# Patient Record
Sex: FEMALE | Race: WHITE | Employment: OTHER | URBAN - METROPOLITAN AREA
[De-identification: names, ages, dates, MRNs, and addresses within clinical notes are randomized per-mention and may not be internally consistent; named-entity substitution may affect disease eponyms.]

---

## 2018-05-25 ENCOUNTER — HOSPITAL ENCOUNTER (OUTPATIENT)
Age: 53
Discharge: HOME | End: 2018-05-27
Payer: COMMERCIAL

## 2018-05-25 ENCOUNTER — OFFICE VISIT (OUTPATIENT)
Dept: CARDIOLOGY CLINIC | Age: 53
End: 2018-05-25
Payer: COMMERCIAL

## 2018-05-25 VITALS
DIASTOLIC BLOOD PRESSURE: 86 MMHG | RESPIRATION RATE: 18 BRPM | BODY MASS INDEX: 44.39 KG/M2 | HEART RATE: 84 BPM | HEIGHT: 64 IN | SYSTOLIC BLOOD PRESSURE: 110 MMHG | WEIGHT: 260 LBS

## 2018-05-25 DIAGNOSIS — I51.9 HEART PROBLEM: Primary | ICD-10-CM

## 2018-05-25 DIAGNOSIS — R06.02 SHORTNESS OF BREATH: ICD-10-CM

## 2018-05-25 LAB
ANION GAP SERPL CALCULATED.3IONS-SCNC: 13 MMOL/L (ref 7–16)
BUN BLDV-MCNC: 15 MG/DL (ref 6–20)
CALCIUM SERPL-MCNC: 9.3 MG/DL (ref 8.6–10.2)
CHLORIDE BLD-SCNC: 104 MMOL/L (ref 98–107)
CO2: 25 MMOL/L (ref 22–29)
CREAT SERPL-MCNC: 0.7 MG/DL (ref 0.5–1)
GFR AFRICAN AMERICAN: >60
GFR NON-AFRICAN AMERICAN: >60 ML/MIN/1.73
GLUCOSE BLD-MCNC: 98 MG/DL (ref 74–109)
POTASSIUM SERPL-SCNC: 4.4 MMOL/L (ref 3.5–5)
PRO-BNP: 17 PG/ML (ref 0–125)
SODIUM BLD-SCNC: 142 MMOL/L (ref 132–146)

## 2018-05-25 PROCEDURE — G8427 DOCREV CUR MEDS BY ELIG CLIN: HCPCS | Performed by: INTERNAL MEDICINE

## 2018-05-25 PROCEDURE — 99213 OFFICE O/P EST LOW 20 MIN: CPT | Performed by: INTERNAL MEDICINE

## 2018-05-25 PROCEDURE — 93000 ELECTROCARDIOGRAM COMPLETE: CPT | Performed by: INTERNAL MEDICINE

## 2018-05-25 PROCEDURE — G8417 CALC BMI ABV UP PARAM F/U: HCPCS | Performed by: INTERNAL MEDICINE

## 2018-05-25 PROCEDURE — 1036F TOBACCO NON-USER: CPT | Performed by: INTERNAL MEDICINE

## 2018-05-25 PROCEDURE — 80048 BASIC METABOLIC PNL TOTAL CA: CPT

## 2018-05-25 PROCEDURE — 3017F COLORECTAL CA SCREEN DOC REV: CPT | Performed by: INTERNAL MEDICINE

## 2018-05-25 PROCEDURE — 83880 ASSAY OF NATRIURETIC PEPTIDE: CPT

## 2018-06-01 ENCOUNTER — OFFICE VISIT (OUTPATIENT)
Dept: CARDIOLOGY CLINIC | Age: 53
End: 2018-06-01
Payer: COMMERCIAL

## 2018-06-01 ENCOUNTER — HOSPITAL ENCOUNTER (OUTPATIENT)
Dept: NON INVASIVE DIAGNOSTICS | Age: 53
Discharge: HOME | End: 2018-06-01
Payer: COMMERCIAL

## 2018-06-01 VITALS
HEART RATE: 75 BPM | BODY MASS INDEX: 44.49 KG/M2 | HEIGHT: 64 IN | DIASTOLIC BLOOD PRESSURE: 80 MMHG | RESPIRATION RATE: 12 BRPM | SYSTOLIC BLOOD PRESSURE: 136 MMHG | WEIGHT: 260.6 LBS

## 2018-06-01 DIAGNOSIS — R07.9 CHEST PAIN, UNSPECIFIED TYPE: ICD-10-CM

## 2018-06-01 DIAGNOSIS — R06.02 SHORTNESS OF BREATH: ICD-10-CM

## 2018-06-01 DIAGNOSIS — I51.9 HEART PROBLEM: Primary | ICD-10-CM

## 2018-06-01 LAB
LV EF: 60 %
LVEF MODALITY: NORMAL

## 2018-06-01 PROCEDURE — G8417 CALC BMI ABV UP PARAM F/U: HCPCS | Performed by: INTERNAL MEDICINE

## 2018-06-01 PROCEDURE — 3017F COLORECTAL CA SCREEN DOC REV: CPT | Performed by: INTERNAL MEDICINE

## 2018-06-01 PROCEDURE — 93000 ELECTROCARDIOGRAM COMPLETE: CPT | Performed by: INTERNAL MEDICINE

## 2018-06-01 PROCEDURE — G8427 DOCREV CUR MEDS BY ELIG CLIN: HCPCS | Performed by: INTERNAL MEDICINE

## 2018-06-01 PROCEDURE — 99213 OFFICE O/P EST LOW 20 MIN: CPT | Performed by: INTERNAL MEDICINE

## 2018-06-01 PROCEDURE — 1036F TOBACCO NON-USER: CPT | Performed by: INTERNAL MEDICINE

## 2018-06-01 PROCEDURE — 93306 TTE W/DOPPLER COMPLETE: CPT

## 2018-06-07 ENCOUNTER — TELEPHONE (OUTPATIENT)
Dept: CARDIOLOGY CLINIC | Age: 53
End: 2018-06-07

## 2018-06-07 DIAGNOSIS — R06.02 SHORTNESS OF BREATH: ICD-10-CM

## 2018-06-07 DIAGNOSIS — R07.9 CHEST PAIN, UNSPECIFIED TYPE: ICD-10-CM

## 2019-02-24 ENCOUNTER — HOSPITAL ENCOUNTER (EMERGENCY)
Age: 54
Discharge: HOME | End: 2019-02-24
Payer: COMMERCIAL

## 2019-02-24 ENCOUNTER — APPOINTMENT (OUTPATIENT)
Dept: GENERAL RADIOLOGY | Age: 54
End: 2019-02-24
Payer: COMMERCIAL

## 2019-02-24 VITALS
BODY MASS INDEX: 44.39 KG/M2 | OXYGEN SATURATION: 97 % | RESPIRATION RATE: 18 BRPM | DIASTOLIC BLOOD PRESSURE: 95 MMHG | WEIGHT: 260 LBS | SYSTOLIC BLOOD PRESSURE: 141 MMHG | TEMPERATURE: 97.7 F | HEART RATE: 82 BPM | HEIGHT: 64 IN

## 2019-02-24 DIAGNOSIS — S46.911A MUSCLE STRAIN OF RIGHT UPPER ARM, INITIAL ENCOUNTER: Primary | ICD-10-CM
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
## 2019-02-24 PROCEDURE — 99283 EMERGENCY DEPT VISIT LOW MDM: CPT

## 2019-02-24 PROCEDURE — 73080 X-RAY EXAM OF ELBOW: CPT

## 2019-02-24 PROCEDURE — 73030 X-RAY EXAM OF SHOULDER: CPT

## 2019-02-24 RX ORDER — CYCLOBENZAPRINE HCL 5 MG
5 TABLET ORAL 3 TIMES DAILY PRN
Qty: 12 TABLET | Refills: 0 | Status: SHIPPED | OUTPATIENT
Start: 2019-02-24 | End: 2019-10-10 | Stop reason: ALTCHOICE

## 2019-02-24 RX ORDER — IBUPROFEN 800 MG/1
800 TABLET ORAL EVERY 6 HOURS PRN
Qty: 16 TABLET | Refills: 0 | Status: ON HOLD | OUTPATIENT
Start: 2019-02-24 | End: 2019-11-19

## 2019-02-24 ASSESSMENT — PAIN DESCRIPTION - LOCATION: LOCATION: ARM

## 2019-02-24 ASSESSMENT — PAIN - FUNCTIONAL ASSESSMENT: PAIN_FUNCTIONAL_ASSESSMENT: ACTIVITIES ARE NOT PREVENTED

## 2019-02-24 ASSESSMENT — PAIN DESCRIPTION - PAIN TYPE: TYPE: ACUTE PAIN

## 2019-02-24 ASSESSMENT — PAIN DESCRIPTION - DESCRIPTORS: DESCRIPTORS: ACHING

## 2019-02-24 ASSESSMENT — PAIN SCALES - GENERAL: PAINLEVEL_OUTOF10: 10

## 2019-02-24 ASSESSMENT — PAIN DESCRIPTION - ORIENTATION: ORIENTATION: RIGHT

## 2019-04-09 ENCOUNTER — HOSPITAL ENCOUNTER (EMERGENCY)
Age: 54
Discharge: HOME | End: 2019-04-09
Attending: EMERGENCY MEDICINE
Payer: COMMERCIAL

## 2019-04-09 ENCOUNTER — APPOINTMENT (OUTPATIENT)
Dept: CT IMAGING | Age: 54
End: 2019-04-09
Payer: COMMERCIAL

## 2019-04-09 ENCOUNTER — APPOINTMENT (OUTPATIENT)
Dept: GENERAL RADIOLOGY | Age: 54
End: 2019-04-09
Payer: COMMERCIAL

## 2019-04-09 VITALS
OXYGEN SATURATION: 97 % | DIASTOLIC BLOOD PRESSURE: 69 MMHG | HEART RATE: 61 BPM | TEMPERATURE: 97.4 F | SYSTOLIC BLOOD PRESSURE: 113 MMHG | RESPIRATION RATE: 18 BRPM

## 2019-04-09 DIAGNOSIS — N39.0 URINARY TRACT INFECTION WITHOUT HEMATURIA, SITE UNSPECIFIED: ICD-10-CM

## 2019-04-09 DIAGNOSIS — R55 SYNCOPE AND COLLAPSE: ICD-10-CM

## 2019-04-09 DIAGNOSIS — R51.9 NONINTRACTABLE HEADACHE, UNSPECIFIED CHRONICITY PATTERN, UNSPECIFIED HEADACHE TYPE: Primary | ICD-10-CM

## 2019-04-09 LAB
ALBUMIN SERPL-MCNC: 4.4 G/DL (ref 3.5–5.2)
ALP BLD-CCNC: 90 U/L (ref 35–104)
ALT SERPL-CCNC: 17 U/L (ref 0–32)
ANION GAP SERPL CALCULATED.3IONS-SCNC: 14 MMOL/L (ref 7–16)
AST SERPL-CCNC: 17 U/L (ref 0–31)
BACTERIA: ABNORMAL /HPF
BASOPHILS ABSOLUTE: 0.03 E9/L (ref 0–0.2)
BASOPHILS RELATIVE PERCENT: 0.5 % (ref 0–2)
BILIRUB SERPL-MCNC: 0.3 MG/DL (ref 0–1.2)
BILIRUBIN URINE: ABNORMAL
BLOOD, URINE: ABNORMAL
BUN BLDV-MCNC: 13 MG/DL (ref 6–20)
CALCIUM SERPL-MCNC: 9.5 MG/DL (ref 8.6–10.2)
CHLORIDE BLD-SCNC: 104 MMOL/L (ref 98–107)
CHP ED QC CHECK: NORMAL
CLARITY: CLEAR
CO2: 24 MMOL/L (ref 22–29)
COLOR: ABNORMAL
CREAT SERPL-MCNC: 0.6 MG/DL (ref 0.5–1)
CRYSTALS, UA: ABNORMAL
EOSINOPHILS ABSOLUTE: 0.1 E9/L (ref 0.05–0.5)
EOSINOPHILS RELATIVE PERCENT: 1.5 % (ref 0–6)
EPITHELIAL CELLS, UA: ABNORMAL /HPF
GFR AFRICAN AMERICAN: >60
GFR NON-AFRICAN AMERICAN: >60 ML/MIN/1.73
GLUCOSE BLD-MCNC: 114 MG/DL
GLUCOSE BLD-MCNC: 119 MG/DL (ref 74–99)
GLUCOSE URINE: NEGATIVE MG/DL
HCG, URINE, POC: NEGATIVE
HCT VFR BLD CALC: 40.6 % (ref 34–48)
HEMOGLOBIN: 12.9 G/DL (ref 11.5–15.5)
IMMATURE GRANULOCYTES #: 0.02 E9/L
IMMATURE GRANULOCYTES %: 0.3 % (ref 0–5)
KETONES, URINE: NEGATIVE MG/DL
LACTIC ACID: 2.1 MMOL/L (ref 0.5–2.2)
LEUKOCYTE ESTERASE, URINE: ABNORMAL
LIPASE: 38 U/L (ref 13–60)
LYMPHOCYTES ABSOLUTE: 1.35 E9/L (ref 1.5–4)
LYMPHOCYTES RELATIVE PERCENT: 20.7 % (ref 20–42)
Lab: NORMAL
MCH RBC QN AUTO: 27.7 PG (ref 26–35)
MCHC RBC AUTO-ENTMCNC: 31.8 % (ref 32–34.5)
MCV RBC AUTO: 87.3 FL (ref 80–99.9)
METER GLUCOSE: 114 MG/DL (ref 74–99)
MONOCYTES ABSOLUTE: 0.35 E9/L (ref 0.1–0.95)
MONOCYTES RELATIVE PERCENT: 5.4 % (ref 2–12)
NEGATIVE QC PASS/FAIL: NORMAL
NEUTROPHILS ABSOLUTE: 4.67 E9/L (ref 1.8–7.3)
NEUTROPHILS RELATIVE PERCENT: 71.6 % (ref 43–80)
NITRITE, URINE: NEGATIVE
PDW BLD-RTO: 14.6 FL (ref 11.5–15)
PH UA: 6 (ref 5–9)
PLATELET # BLD: 300 E9/L (ref 130–450)
PMV BLD AUTO: 10.4 FL (ref 7–12)
POSITIVE QC PASS/FAIL: NORMAL
POTASSIUM REFLEX MAGNESIUM: 3.6 MMOL/L (ref 3.5–5)
PROTEIN UA: NEGATIVE MG/DL
RBC # BLD: 4.65 E12/L (ref 3.5–5.5)
RBC UA: ABNORMAL /HPF (ref 0–2)
SODIUM BLD-SCNC: 142 MMOL/L (ref 132–146)
SPECIFIC GRAVITY UA: >=1.03 (ref 1–1.03)
TOTAL PROTEIN: 7.3 G/DL (ref 6.4–8.3)
TROPONIN: <0.01 NG/ML (ref 0–0.03)
TSH SERPL DL<=0.05 MIU/L-ACNC: 2.93 UIU/ML (ref 0.27–4.2)
UROBILINOGEN, URINE: 0.2 E.U./DL
WBC # BLD: 6.5 E9/L (ref 4.5–11.5)
WBC UA: ABNORMAL /HPF (ref 0–5)

## 2019-04-09 PROCEDURE — 83605 ASSAY OF LACTIC ACID: CPT

## 2019-04-09 PROCEDURE — 99284 EMERGENCY DEPT VISIT MOD MDM: CPT

## 2019-04-09 PROCEDURE — 96375 TX/PRO/DX INJ NEW DRUG ADDON: CPT

## 2019-04-09 PROCEDURE — 81001 URINALYSIS AUTO W/SCOPE: CPT

## 2019-04-09 PROCEDURE — 84443 ASSAY THYROID STIM HORMONE: CPT

## 2019-04-09 PROCEDURE — 83690 ASSAY OF LIPASE: CPT

## 2019-04-09 PROCEDURE — 72125 CT NECK SPINE W/O DYE: CPT

## 2019-04-09 PROCEDURE — 82962 GLUCOSE BLOOD TEST: CPT

## 2019-04-09 PROCEDURE — 36415 COLL VENOUS BLD VENIPUNCTURE: CPT

## 2019-04-09 PROCEDURE — 85025 COMPLETE CBC W/AUTO DIFF WBC: CPT

## 2019-04-09 PROCEDURE — 96374 THER/PROPH/DIAG INJ IV PUSH: CPT

## 2019-04-09 PROCEDURE — 71046 X-RAY EXAM CHEST 2 VIEWS: CPT

## 2019-04-09 PROCEDURE — 2580000003 HC RX 258: Performed by: EMERGENCY MEDICINE

## 2019-04-09 PROCEDURE — 6360000002 HC RX W HCPCS: Performed by: EMERGENCY MEDICINE

## 2019-04-09 PROCEDURE — 84484 ASSAY OF TROPONIN QUANT: CPT

## 2019-04-09 PROCEDURE — 70450 CT HEAD/BRAIN W/O DYE: CPT

## 2019-04-09 PROCEDURE — 80053 COMPREHEN METABOLIC PANEL: CPT

## 2019-04-09 RX ORDER — METOCLOPRAMIDE HYDROCHLORIDE 5 MG/ML
10 INJECTION INTRAMUSCULAR; INTRAVENOUS ONCE
Status: COMPLETED | OUTPATIENT
Start: 2019-04-09 | End: 2019-04-09

## 2019-04-09 RX ORDER — DIPHENHYDRAMINE HYDROCHLORIDE 50 MG/ML
25 INJECTION INTRAMUSCULAR; INTRAVENOUS ONCE
Status: COMPLETED | OUTPATIENT
Start: 2019-04-09 | End: 2019-04-09

## 2019-04-09 RX ORDER — CEFDINIR 300 MG/1
300 CAPSULE ORAL 2 TIMES DAILY
Qty: 14 CAPSULE | Refills: 0 | Status: SHIPPED | OUTPATIENT
Start: 2019-04-09 | End: 2019-04-16

## 2019-04-09 RX ORDER — 0.9 % SODIUM CHLORIDE 0.9 %
1000 INTRAVENOUS SOLUTION INTRAVENOUS ONCE
Status: COMPLETED | OUTPATIENT
Start: 2019-04-09 | End: 2019-04-09

## 2019-04-09 RX ADMIN — METOCLOPRAMIDE 10 MG: 5 INJECTION, SOLUTION INTRAMUSCULAR; INTRAVENOUS at 11:52

## 2019-04-09 RX ADMIN — SODIUM CHLORIDE 1000 ML: 9 INJECTION, SOLUTION INTRAVENOUS at 10:45

## 2019-04-09 RX ADMIN — DIPHENHYDRAMINE HYDROCHLORIDE 25 MG: 50 INJECTION INTRAMUSCULAR; INTRAVENOUS at 11:52

## 2019-04-09 ASSESSMENT — ENCOUNTER SYMPTOMS
DIARRHEA: 0
SHORTNESS OF BREATH: 0
BACK PAIN: 0
COUGH: 0
SINUS PAIN: 0
SORE THROAT: 0
PHOTOPHOBIA: 0
VOMITING: 0
NAUSEA: 1
ABDOMINAL PAIN: 1
CHEST TIGHTNESS: 0
EYE REDNESS: 0
EYE PAIN: 0

## 2019-04-09 NOTE — ED PROVIDER NOTES
Face to face end of shift report received from [facility]. Rounding completed. Patient observed in bed.     Beth Rand  9/12/2017  11:45 PM         This is a 49 yo F with a PMH of HLD and 'atrial septal aneurysm' presents with various complaints including syncope, HA and lightheadedness that have been going on for several days. Patient complains of HA for the past few days. She states she attributes it to stress but has had multiple falls and dizziness and wanted to be evaluated. She has a  with colon CA she has been helping to take care of. She denies any SI/HI/hallucinations. Headaches are located on the top of her head, feels like a dull pain that is pretty constant. Pain rated as 8/10, says it waxes and wanes. Sleeping helps the pain otherwise she hasn't tried anything for pain. She wears contacts rarely and glasses, states that over time she has had worsening vision. Denies blurry/double vision/photophobia. Last week she was ambulating, became dizzy/lightheaded and 'went down'. Unsure how long she passed out for but her friend was upstairs when this happened, came down and found patient down in her living room. She had fallen onto her back and 'came to' within a few minutes. He wanted her to come to ED, she refused at that time. States she has had some pain in the back of her head and neck from the fall. She also complains of abdominal pain she has had for months but has not recently been evaluated for. She has a history of hematemesis and hematochezia, has previously had endoscopy and colonoscopy and is supposed to follow up for repeat exams because polyps were previously found. Review of Systems   Constitutional: Positive for fatigue. Negative for activity change, chills and fever. HENT: Negative for congestion, sinus pain and sore throat. Eyes: Negative for photophobia, pain, redness and visual disturbance. Respiratory: Negative for cough, chest tightness and shortness of breath. Cardiovascular: Negative for chest pain, palpitations and leg swelling. Gastrointestinal: Positive for abdominal pain and nausea. Negative for diarrhea and vomiting. Genitourinary: Negative for dysuria and urgency. Musculoskeletal: Positive for neck pain. Negative for back pain and neck stiffness. Skin: Negative for rash. Neurological: Positive for syncope, light-headedness and headaches. Negative for dizziness and seizures. Psychiatric/Behavioral: Negative for confusion and suicidal ideas. The patient is not nervous/anxious. Physical Exam   Constitutional: She is oriented to person, place, and time. She appears well-developed and well-nourished. No distress. HENT:   Head: Normocephalic and atraumatic. Nose: Nose normal.   Mouth/Throat: Oropharynx is clear and moist. No oropharyngeal exudate. Eyes: Pupils are equal, round, and reactive to light. Conjunctivae and EOM are normal.   Neck: Normal range of motion. Neck supple. There is some paraspinal tenderness with palpation, normal ROM, no crepitance   Cardiovascular: Normal rate, regular rhythm, normal heart sounds and intact distal pulses. Pulmonary/Chest: Effort normal and breath sounds normal. No respiratory distress. She exhibits no tenderness. Abdominal: Soft. Bowel sounds are normal. She exhibits no distension. There is tenderness. Minimal periumbilical pain with palpation   Musculoskeletal: Normal range of motion. She exhibits no edema. Neurological: She is alert and oriented to person, place, and time. No cranial nerve deficit or sensory deficit. She exhibits normal muscle tone. Skin: Skin is warm and dry. Capillary refill takes less than 2 seconds. She is not diaphoretic. Psychiatric: She has a normal mood and affect. Her behavior is normal. Judgment and thought content normal.   Nursing note and vitals reviewed.       Procedures    MDM  Number of Diagnoses or Management Options  Nonintractable headache, unspecified chronicity pattern, unspecified headache type:   Syncope and collapse:   Urinary tract infection without hematuria, site unspecified: Diagnosis management comments: This is a 71-year-old female that presents with multiple complaints including headache, emesis, syncope, dizziness and abdominal pain. Symptoms have been ongoing for several days to weeks. The patient is resting comfortably on all of my encounters with her. She has minimal abdominal tenderness. Her stroke scale is 0. She is answering all my questions appropriately. She is ambulatory without assistance. Labs and imaging obtained for her lightheadedness and syncope and reveal UTI. Headache cocktail given and patient's headache has resolved. She would like to go home. Vitals remained stable throughout emergency department stay. Discussed return precautions and she voices understanding. She will follow up with her PCP within 3 days for reevaluation. If she is unable to get in with her primary care provider, she'll return to the emergency department for reassessment or if her condition worsens. All questions answered and she is discharged with stable vital signs. ED Course as of Apr 09 1322   Tue Apr 09, 2019   1209 Patient states her headache has significantly improved. Will start on CASTRO ROLAND for urinary tract infection. We'll plan to discharge with close follow-up. She is in agreement with the plan. [CW]      ED Course User Index  [CW] Melinda Lam DO       EKG: This EKG is signed and interpreted by me. Rate: 55  Rhythm: Sinus  Interpretation: sinus bradycardia, no LUCA or depression, q wave lead III  Comparison: unchanged  NIH Stroke Scale      Interval: Baseline  Time: 10:05 AM  Person Administering Scale: Melinda Lam    Administer stroke scale items in the order listed. Record performance in each category after each subscale exam. Do not go back and change scores. Follow directions provided for each exam technique. Scores should reflect what the patient does, not what the clinician thinks the patient can do.  The clinician should record answers while administering the exam and work quickly. Except where indicated, the patient should not be coached (i.e., repeated requests to patient to make a special effort). 1a  Level of consciousness: 0=alert; keenly responsive   1b. LOC questions:  0=Performs both tasks correctly   1c. LOC commands: 0=Performs both tasks correctly   2. Best Gaze: 0=normal   3. Visual: 0=No visual loss   4. Facial Palsy: 0=Normal symmetric movement   5a. Motor left arm: 0=No drift, limb holds 90 (or 45) degrees for full 10 seconds   5b. Motor right arm: 0=No drift, limb holds 90 (or 45) degrees for full 10 seconds   6a. motor left le=No drift, limb holds 90 (or 45) degrees for full 10 seconds   6b  Motor right le=No drift, limb holds 90 (or 45) degrees for full 10 seconds   7. Limb Ataxia: 0=Absent   8. Sensory: 0=Normal; no sensory loss   9. Best Language:  0=No aphasia, normal   10. Dysarthria: 0=Normal   11. Extinction and Inattention: 0=No abnormality   12. Distal motor function: 0=Normal    Total:   0        --------------------------------------------- PAST HISTORY ---------------------------------------------  Past Medical History:  has a past medical history of Atrial septal aneurysm, Gall bladder disease, Herniated disc, Hyperlipidemia, Infectious lymphangitis of deep lymphatics, and Kidney stone. Past Surgical History:  has a past surgical history that includes  section and [place] tooth extraction. Social History:  reports that she has never smoked. She has never used smokeless tobacco. She reports that she does not drink alcohol or use drugs. Family History: family history includes Arthritis in her brother and mother; Cancer in her sister; Colon Cancer in her father; Heart Disease in her brother and mother; Hypertension in her mother. The patients home medications have been reviewed. Allergies: Patient has no known allergies.     -------------------------------------------------- RESULTS -------------------------------------------------  Labs:  Results for orders placed or performed during the hospital encounter of 04/09/19   CBC Auto Differential   Result Value Ref Range    WBC 6.5 4.5 - 11.5 E9/L    RBC 4.65 3.50 - 5.50 E12/L    Hemoglobin 12.9 11.5 - 15.5 g/dL    Hematocrit 40.6 34.0 - 48.0 %    MCV 87.3 80.0 - 99.9 fL    MCH 27.7 26.0 - 35.0 pg    MCHC 31.8 (L) 32.0 - 34.5 %    RDW 14.6 11.5 - 15.0 fL    Platelets 351 373 - 588 E9/L    MPV 10.4 7.0 - 12.0 fL    Neutrophils % 71.6 43.0 - 80.0 %    Immature Granulocytes % 0.3 0.0 - 5.0 %    Lymphocytes % 20.7 20.0 - 42.0 %    Monocytes % 5.4 2.0 - 12.0 %    Eosinophils % 1.5 0.0 - 6.0 %    Basophils % 0.5 0.0 - 2.0 %    Neutrophils # 4.67 1.80 - 7.30 E9/L    Immature Granulocytes # 0.02 E9/L    Lymphocytes # 1.35 (L) 1.50 - 4.00 E9/L    Monocytes # 0.35 0.10 - 0.95 E9/L    Eosinophils # 0.10 0.05 - 0.50 E9/L    Basophils # 0.03 0.00 - 0.20 E9/L   Comprehensive Metabolic Panel w/ Reflex to MG   Result Value Ref Range    Sodium 142 132 - 146 mmol/L    Potassium reflex Magnesium 3.6 3.5 - 5.0 mmol/L    Chloride 104 98 - 107 mmol/L    CO2 24 22 - 29 mmol/L    Anion Gap 14 7 - 16 mmol/L    Glucose 119 (H) 74 - 99 mg/dL    BUN 13 6 - 20 mg/dL    CREATININE 0.6 0.5 - 1.0 mg/dL    GFR Non-African American >60 >=60 mL/min/1.73    GFR African American >60     Calcium 9.5 8.6 - 10.2 mg/dL    Total Protein 7.3 6.4 - 8.3 g/dL    Alb 4.4 3.5 - 5.2 g/dL    Total Bilirubin 0.3 0.0 - 1.2 mg/dL    Alkaline Phosphatase 90 35 - 104 U/L    ALT 17 0 - 32 U/L    AST 17 0 - 31 U/L   Troponin   Result Value Ref Range    Troponin <0.01 0.00 - 0.03 ng/mL   TSH without Reflex   Result Value Ref Range    TSH 2.930 0.270 - 4.200 uIU/mL   Urinalysis, reflex to microscopic   Result Value Ref Range    Color, UA DARK YELLOW (A) Straw/Yellow    Clarity, UA Clear Clear    Glucose, Ur Negative Negative mg/dL    Bilirubin Urine SMALL (A) Negative    Ketones, Urine Negative Negative mg/dL    Specific Gravity, UA >=1.030 1.005 - 1.030    Blood, Urine TRACE-LYSED Negative    pH, UA 6.0 5.0 - 9.0    Protein, UA Negative Negative mg/dL    Urobilinogen, Urine 0.2 <2.0 E.U./dL    Nitrite, Urine Negative Negative    Leukocyte Esterase, Urine SMALL (A) Negative   Lipase   Result Value Ref Range    Lipase 38 13 - 60 U/L   Lactic Acid, Plasma   Result Value Ref Range    Lactic Acid 2.1 0.5 - 2.2 mmol/L   Microscopic Urinalysis   Result Value Ref Range    WBC, UA 5-10 0 - 5 /HPF    RBC, UA 1-3 0 - 2 /HPF    Epi Cells MANY /HPF    Bacteria, UA FEW (A) /HPF    Crystals Many    POCT Glucose   Result Value Ref Range    Glucose 114 mg/dL    QC OK? ok    POC Pregnancy Urine   Result Value Ref Range    HCG, Urine, POC Negative Negative    Lot Number [id number]     Positive QC Pass/Fail Pass     Negative QC Pass/Fail Pass    POCT Glucose   Result Value Ref Range    Meter Glucose 114 (H) 74 - 99 mg/dL   EKG 12 Lead   Result Value Ref Range    Ventricular Rate 55 BPM    Atrial Rate 55 BPM    P-R Interval 166 ms    QRS Duration 88 ms    Q-T Interval 458 ms    QTc Calculation (Bazett) 438 ms    P Axis 38 degrees    R Axis 25 degrees    T Axis 23 degrees       Radiology:  CT Cervical Spine WO Contrast   Final Result   Degenerative changes      CT Head WO Contrast   Final Result   Diffuse atrophy likely age related   Findings compatible with small vessel ischemic changes. Findings compatible with a lacunar infarct, likely old. XR CHEST STANDARD (2 VW)   Final Result   normal chest                             ------------------------- NURSING NOTES AND VITALS REVIEWED ---------------------------  Date / Time Roomed:  4/9/2019  9:36 AM  ED Bed Assignment:  14/14    The nursing notes within the ED encounter and vital signs as below have been reviewed.    /69   Pulse 61   Temp 97.4 °F (36.3 °C) (Oral)   Resp 18   LMP 03/19/2019   SpO2 97%   Oxygen Saturation Interpretation: Normal      ------------------------------------------ PROGRESS NOTES ------------------------------------------  ED COURSE MEDICATIONS:                Medications   0.9 % sodium chloride bolus (0 mLs Intravenous Stopped 4/9/19 1156)   metoclopramide (REGLAN) injection 10 mg (10 mg Intravenous Given 4/9/19 1152)   diphenhydrAMINE (BENADRYL) injection 25 mg (25 mg Intravenous Given 4/9/19 1152)       I have spoken with the patient and discussed todays results, in addition to providing specific details for the plan of care and counseling regarding the diagnosis and prognosis. Their questions are answered at this time and they are agreeable with the plan. I discussed at length with them reasons for immediate return here for re evaluation. They will followup with primary care by calling their office tomorrow. --------------------------------- ADDITIONAL PROVIDER NOTES ---------------------------------  At this time the patient is without objective evidence of an acute process requiring hospitalization or inpatient management. They have remained hemodynamically stable throughout their entire ED visit and are stable for discharge with outpatient follow-up. The plan has been discussed in detail and they are aware of the specific conditions for emergent return, as well as the importance of follow-up. New Prescriptions    CEFDINIR (OMNICEF) 300 MG CAPSULE    Take 1 capsule by mouth 2 times daily for 7 days       Diagnosis:  1. Nonintractable headache, unspecified chronicity pattern, unspecified headache type    2. Urinary tract infection without hematuria, site unspecified    3. Syncope and collapse        Disposition:  Patient's disposition: Discharge to home  Patient's condition is stable.          Duarte Dodge, DO  Resident  04/09/19 [id number]

## 2019-04-17 LAB
EKG ATRIAL RATE: 55 BPM
EKG P AXIS: 38 DEGREES
EKG P-R INTERVAL: 166 MS
EKG Q-T INTERVAL: 458 MS
EKG QRS DURATION: 88 MS
EKG QTC CALCULATION (BAZETT): 438 MS
EKG R AXIS: 25 DEGREES
EKG T AXIS: 23 DEGREES
EKG VENTRICULAR RATE: 55 BPM

## 2019-05-28 ENCOUNTER — APPOINTMENT (OUTPATIENT)
Dept: CT IMAGING | Age: 54
End: 2019-05-28
Payer: COMMERCIAL

## 2019-05-28 ENCOUNTER — APPOINTMENT (OUTPATIENT)
Dept: GENERAL RADIOLOGY | Age: 54
End: 2019-05-28
Payer: COMMERCIAL

## 2019-05-28 ENCOUNTER — HOSPITAL ENCOUNTER (EMERGENCY)
Age: 54
Discharge: HOME | End: 2019-05-28
Payer: COMMERCIAL

## 2019-05-28 VITALS
HEART RATE: 72 BPM | WEIGHT: 260 LBS | OXYGEN SATURATION: 98 % | TEMPERATURE: 97.7 F | DIASTOLIC BLOOD PRESSURE: 78 MMHG | SYSTOLIC BLOOD PRESSURE: 126 MMHG | RESPIRATION RATE: 18 BRPM | HEIGHT: 64 IN | BODY MASS INDEX: 44.39 KG/M2

## 2019-05-28 DIAGNOSIS — R06.02 SOB (SHORTNESS OF BREATH): ICD-10-CM

## 2019-05-28 DIAGNOSIS — E87.6 HYPOKALEMIA: ICD-10-CM

## 2019-05-28 DIAGNOSIS — R07.89 ACUTE CHEST WALL PAIN: Primary | ICD-10-CM

## 2019-05-28 DIAGNOSIS — I25.3 ATRIAL SEPTAL ANEURYSM: ICD-10-CM

## 2019-05-28 LAB
ANION GAP SERPL CALCULATED.3IONS-SCNC: 13 MMOL/L (ref 7–16)
BASOPHILS ABSOLUTE: 0.04 E9/L (ref 0–0.2)
BASOPHILS RELATIVE PERCENT: 0.7 % (ref 0–2)
BUN BLDV-MCNC: 13 MG/DL (ref 6–20)
CALCIUM SERPL-MCNC: 9.2 MG/DL (ref 8.6–10.2)
CHLORIDE BLD-SCNC: 102 MMOL/L (ref 98–107)
CO2: 23 MMOL/L (ref 22–29)
CREAT SERPL-MCNC: 0.6 MG/DL (ref 0.5–1)
EKG ATRIAL RATE: 82 BPM
EKG P AXIS: 47 DEGREES
EKG P-R INTERVAL: 178 MS
EKG Q-T INTERVAL: 372 MS
EKG QRS DURATION: 88 MS
EKG QTC CALCULATION (BAZETT): 434 MS
EKG R AXIS: 33 DEGREES
EKG T AXIS: 27 DEGREES
EKG VENTRICULAR RATE: 82 BPM
EOSINOPHILS ABSOLUTE: 0.09 E9/L (ref 0.05–0.5)
EOSINOPHILS RELATIVE PERCENT: 1.6 % (ref 0–6)
GFR AFRICAN AMERICAN: >60
GFR NON-AFRICAN AMERICAN: >60 ML/MIN/1.73
GLUCOSE BLD-MCNC: 157 MG/DL (ref 74–99)
HCT VFR BLD CALC: 35.6 % (ref 34–48)
HEMOGLOBIN: 11.2 G/DL (ref 11.5–15.5)
IMMATURE GRANULOCYTES #: 0.02 E9/L
IMMATURE GRANULOCYTES %: 0.3 % (ref 0–5)
INR BLD: 1.1
LYMPHOCYTES ABSOLUTE: 1.33 E9/L (ref 1.5–4)
LYMPHOCYTES RELATIVE PERCENT: 23.1 % (ref 20–42)
MAGNESIUM: 1.8 MG/DL (ref 1.6–2.6)
MCH RBC QN AUTO: 28 PG (ref 26–35)
MCHC RBC AUTO-ENTMCNC: 31.5 % (ref 32–34.5)
MCV RBC AUTO: 89 FL (ref 80–99.9)
MONOCYTES ABSOLUTE: 0.43 E9/L (ref 0.1–0.95)
MONOCYTES RELATIVE PERCENT: 7.5 % (ref 2–12)
NEUTROPHILS ABSOLUTE: 3.84 E9/L (ref 1.8–7.3)
NEUTROPHILS RELATIVE PERCENT: 66.8 % (ref 43–80)
PDW BLD-RTO: 15.3 FL (ref 11.5–15)
PLATELET # BLD: 254 E9/L (ref 130–450)
PMV BLD AUTO: 9.9 FL (ref 7–12)
POTASSIUM REFLEX MAGNESIUM: 3.2 MMOL/L (ref 3.5–5)
PRO-BNP: 21 PG/ML (ref 0–125)
PROTHROMBIN TIME: 12.5 SEC (ref 9.3–12.4)
RBC # BLD: 4 E12/L (ref 3.5–5.5)
SODIUM BLD-SCNC: 138 MMOL/L (ref 132–146)
TROPONIN: <0.01 NG/ML (ref 0–0.03)
WBC # BLD: 5.8 E9/L (ref 4.5–11.5)

## 2019-05-28 PROCEDURE — 2580000003 HC RX 258: Performed by: RADIOLOGY

## 2019-05-28 PROCEDURE — 93005 ELECTROCARDIOGRAM TRACING: CPT | Performed by: NURSE PRACTITIONER

## 2019-05-28 PROCEDURE — 93010 ELECTROCARDIOGRAM REPORT: CPT | Performed by: INTERNAL MEDICINE

## 2019-05-28 PROCEDURE — 83735 ASSAY OF MAGNESIUM: CPT

## 2019-05-28 PROCEDURE — 6360000004 HC RX CONTRAST MEDICATION: Performed by: RADIOLOGY

## 2019-05-28 PROCEDURE — 71045 X-RAY EXAM CHEST 1 VIEW: CPT

## 2019-05-28 PROCEDURE — 85610 PROTHROMBIN TIME: CPT

## 2019-05-28 PROCEDURE — 6370000000 HC RX 637 (ALT 250 FOR IP): Performed by: NURSE PRACTITIONER

## 2019-05-28 PROCEDURE — 71275 CT ANGIOGRAPHY CHEST: CPT

## 2019-05-28 PROCEDURE — 84484 ASSAY OF TROPONIN QUANT: CPT

## 2019-05-28 PROCEDURE — 85025 COMPLETE CBC W/AUTO DIFF WBC: CPT

## 2019-05-28 PROCEDURE — 80048 BASIC METABOLIC PNL TOTAL CA: CPT

## 2019-05-28 PROCEDURE — 6370000000 HC RX 637 (ALT 250 FOR IP)

## 2019-05-28 PROCEDURE — 99285 EMERGENCY DEPT VISIT HI MDM: CPT

## 2019-05-28 PROCEDURE — 83880 ASSAY OF NATRIURETIC PEPTIDE: CPT

## 2019-05-28 RX ORDER — ORPHENADRINE CITRATE 100 MG/1
100 TABLET, EXTENDED RELEASE ORAL 2 TIMES DAILY
Status: DISCONTINUED | OUTPATIENT
Start: 2019-05-28 | End: 2019-05-28

## 2019-05-28 RX ORDER — SODIUM CHLORIDE 0.9 % (FLUSH) 0.9 %
10 SYRINGE (ML) INJECTION PRN
Status: DISCONTINUED | OUTPATIENT
Start: 2019-05-28 | End: 2019-05-28 | Stop reason: HOSPADM

## 2019-05-28 RX ORDER — POTASSIUM CHLORIDE 20 MEQ/1
20 TABLET, EXTENDED RELEASE ORAL ONCE
Status: COMPLETED | OUTPATIENT
Start: 2019-05-28 | End: 2019-05-28

## 2019-05-28 RX ORDER — ETODOLAC 400 MG/1
400 TABLET, EXTENDED RELEASE ORAL DAILY
Qty: 30 TABLET | Refills: 0 | Status: SHIPPED | OUTPATIENT
Start: 2019-05-28 | End: 2019-10-10 | Stop reason: ALTCHOICE

## 2019-05-28 RX ORDER — ACETAMINOPHEN 325 MG/1
650 TABLET ORAL ONCE
Status: COMPLETED | OUTPATIENT
Start: 2019-05-28 | End: 2019-05-28

## 2019-05-28 RX ORDER — ORPHENADRINE CITRATE 100 MG/1
100 TABLET, EXTENDED RELEASE ORAL 2 TIMES DAILY
Qty: 10 TABLET | Refills: 0 | Status: SHIPPED | OUTPATIENT
Start: 2019-05-28 | End: 2019-06-02

## 2019-05-28 RX ORDER — ORPHENADRINE CITRATE 100 MG/1
100 TABLET, EXTENDED RELEASE ORAL ONCE
Status: COMPLETED | OUTPATIENT
Start: 2019-05-28 | End: 2019-05-28

## 2019-05-28 RX ORDER — ORPHENADRINE CITRATE 100 MG/1
TABLET, EXTENDED RELEASE ORAL
Status: COMPLETED
Start: 2019-05-28 | End: 2019-05-28

## 2019-05-28 RX ADMIN — ORPHENADRINE CITRATE 100 MG: 100 TABLET, EXTENDED RELEASE ORAL at 03:59

## 2019-05-28 RX ADMIN — POTASSIUM CHLORIDE 20 MEQ: 20 TABLET, EXTENDED RELEASE ORAL at 03:48

## 2019-05-28 RX ADMIN — IOPAMIDOL 80 ML: 755 INJECTION, SOLUTION INTRAVENOUS at 03:04

## 2019-05-28 RX ADMIN — ACETAMINOPHEN 650 MG: 325 TABLET ORAL at 03:59

## 2019-05-28 RX ADMIN — Medication 10 ML: at 03:03

## 2019-05-28 ASSESSMENT — PAIN DESCRIPTION - PAIN TYPE: TYPE: ACUTE PAIN

## 2019-05-28 ASSESSMENT — PAIN SCALES - GENERAL: PAINLEVEL_OUTOF10: 8

## 2019-05-28 NOTE — ED NOTES
Patient discharged per instructions. Patient  verbalized understanding of discharge orders.      Lucy Doss RN  05/28/19 [id number]

## 2019-05-28 NOTE — ED PROVIDER NOTES
[facility]dependent [facility]- case labs and imaging, history and findings detail with Dr. Katie Fernandez who agreed with plan of care. HPI:  19,   Time: 1:25 AM         Demetria Grullon is a 48 y.o. female presenting to the ED for onset yesterday of global chest pain sharp stabbing with SOB worse with ad cough or sneeze which she claims is not doing much of. Has a Hx of atrial septal aneurysm which she is well overdue to have cardiology evaluation. She is on metoprolol and taking them rarely. She is on any anticoagulants. Has no history of PE DVT or other clotting disorders. The complaint has been persistent, severe in severity. Denies F/C/N/V/HA/Abd Pain/visual changes or eye pain/fall, injury, or other trauma/LOC or Syncope/Lightheadedness/change in sensation, numbness, tingling, function of neck, facial structures, bilateral upper and lower extremities /change in function of or incontinence of bowel or bladder /hematuria or dysuria. ROS:   Pertinent positives and negatives are stated within HPI, all other systems reviewed and are negative.  --------------------------------------------- PAST HISTORY ---------------------------------------------  Past Medical History:  has a past medical history of Atrial septal aneurysm, Gall bladder disease, Herniated disc, Hyperlipidemia, Infectious lymphangitis of deep lymphatics, and Kidney stone. Past Surgical History:  has a past surgical history that includes  section and [place] tooth extraction. Social History:  reports that she has never smoked. She has never used smokeless tobacco. She reports that she does not drink alcohol or use drugs. Family History: family history includes Arthritis in her brother and mother; Cancer in her sister; Colon Cancer in her father; Heart Disease in her brother and mother; Hypertension in her mother. The patients home medications have been reviewed.     Allergies: Patient has no known allergies. -------------------------------------------------- RESULTS -------------------------------------------------  All laboratory and radiology results have been personally reviewed by myself   LABS:  Results for orders placed or performed during the hospital encounter of 05/28/19   CBC Auto Differential   Result Value Ref Range    WBC 5.8 4.5 - 11.5 E9/L    RBC 4.00 3.50 - 5.50 E12/L    Hemoglobin 11.2 (L) 11.5 - 15.5 g/dL    Hematocrit 35.6 34.0 - 48.0 %    MCV 89.0 80.0 - 99.9 fL    MCH 28.0 26.0 - 35.0 pg    MCHC 31.5 (L) 32.0 - 34.5 %    RDW 15.3 (H) 11.5 - 15.0 fL    Platelets 376 546 - 771 E9/L    MPV 9.9 7.0 - 12.0 fL    Neutrophils % 66.8 43.0 - 80.0 %    Immature Granulocytes % 0.3 0.0 - 5.0 %    Lymphocytes % 23.1 20.0 - 42.0 %    Monocytes % 7.5 2.0 - 12.0 %    Eosinophils % 1.6 0.0 - 6.0 %    Basophils % 0.7 0.0 - 2.0 %    Neutrophils # 3.84 1.80 - 7.30 E9/L    Immature Granulocytes # 0.02 E9/L    Lymphocytes # 1.33 (L) 1.50 - 4.00 E9/L    Monocytes # 0.43 0.10 - 0.95 E9/L    Eosinophils # 0.09 0.05 - 0.50 E9/L    Basophils # 0.04 0.00 - 0.20 F1/I   Basic Metabolic Panel w/ Reflex to MG   Result Value Ref Range    Sodium 138 132 - 146 mmol/L    Potassium reflex Magnesium 3.2 (L) 3.5 - 5.0 mmol/L    Chloride 102 98 - 107 mmol/L    CO2 23 22 - 29 mmol/L    Anion Gap 13 7 - 16 mmol/L    Glucose 157 (H) 74 - 99 mg/dL    BUN 13 6 - 20 mg/dL    CREATININE 0.6 0.5 - 1.0 mg/dL    GFR Non-African American >60 >=60 mL/min/1.73    GFR African American >60     Calcium 9.2 8.6 - 10.2 mg/dL   Troponin   Result Value Ref Range    Troponin <0.01 0.00 - 0.03 ng/mL   Protime-INR   Result Value Ref Range    Protime 12.5 (H) 9.3 - 12.4 sec    INR 1.1    Brain Natriuretic Peptide   Result Value Ref Range    Pro-BNP 21 0 - 125 pg/mL   Magnesium   Result Value Ref Range    Magnesium 1.8 1.6 - 2.6 mg/dL       RADIOLOGY:  Interpreted by Radiologist.  CTA PULMONARY W CONTRAST   Final Result   No pulmonary emboli. This report has been electronically signed by Rafi Howell MD.      XR CHEST PORTABLE    (Results Pending)   No acute cardio for findings or changes from the previous image. ------------------------- NURSING NOTES AND VITALS REVIEWED ---------------------------   The nursing notes within the ED encounter and vital signs as below have been reviewed. /78   Pulse 72   Temp 97.7 °F (36.5 °C)   Resp 18   Ht 5' 4\" (1.626 m)   Wt 260 lb (117.9 kg)   SpO2 98%   BMI 44.63 kg/m²   Oxygen Saturation Interpretation: Normal  Vitals:    05/28/19 0115 05/28/19 0354   BP: 138/82 126/78   Pulse: 79 72   Resp: 20 18   Temp: 97.7 °F (36.5 °C)    SpO2: 98% 98%   Weight: 260 lb (117.9 kg)    Height: 5' 4\" (1.626 m)          ---------------------------------------------------PHYSICAL EXAM--------------------------------------      Constitutional/General: Alert and oriented x3, well appearing, non toxic in NAD  Head: NC/AT  Eye/Ears: PERRL, bilaterally EOMs without pain, no globe tenderness, sclera clear. Bilateral tympanic membranes are normal, no pre-or postauricular nodes, mastoids are normal.  Nose: Bilateral nasal turbinates mildly injected, boggy, clear mucus noted  Mouth: Oropharynx clear, uvula ML normal, handling secretions, MMM, no trismus or TMJ, normal dentition w/o abscess noted  Neck: Supple, full ROM, no ML cervical vertebral bone tenderness throughout, no meningeal signs  Pulmonary: Lungs clear to auscultation bilaterally, no wheezes, rales, or rhonchi. Not in respiratory distress, chest is tender throughout to compression reproducing her pain somewhat but not exactly  Cardiovascular:  Regular rate and rhythm, no murmurs, gallops, or rubs. 2+ distal pulses, no peripheral edema  Abdomen: Obese habitus limits full exam: Soft, non tender, non distended, no organomegaly, no masses, normal active bowel sounds throughout, negative CVAT, no peritoneal signs.   Back: No midline vertebral tenderness noted throughout, no lesions, no erythema, no bruising, or other signs of trauma  Extremities: Moves all extremities x 4. Warm and well perfused  Skin: warm and dry without rash  Neurologic: GCS 15, Face is symmetric (VII), EOMs are intact (III, IV, VI), pupils are equal and reactive (II, III), tongue is midline (XII). The patient is walking in a smooth balanced and coordinated fashion w/o bumping or walking into anything (vision), talking w/ appropriate content and fluency, is fully attentive, and provided a spontaneous coherent history. Psych: Normal Affect      ------------------------------------------PROGRESS NOTES -------------------------------------------  EKG #1:  Interpreted by emergency department physician unless otherwise noted. Time:  0115    Rate: 82  Rhythm: Sinus. Interpretation: normal EKG, normal sinus rhythm. NO STEMI- per Dr. Michael Castillo    MDM  No PE on CTA. EKG within normal limits. Cardiac enzymes normal. Treating patient for chest wall pain with Norflex and Tylenol. Follow-up with PCP. Danger signs symptoms are worsening condition or detail with patient in follow-up emergency department. Her hypokalemia was treated in the emergency department and will need outpatient follow-up. She states verbal understanding of the aforementioned. ED COURSE MEDICATIONS:                Medications   sodium chloride flush 0.9 % injection 10 mL (10 mLs Intravenous Given 5/28/19 0303)   acetaminophen (TYLENOL) tablet 650 mg (has no administration in time range)   orphenadrine (NORFLEX) extended release tablet 100 mg (has no administration in time range)   iopamidol (ISOVUE-370) 76 % injection 80 mL (80 mLs Intravenous Given 5/28/19 0304)   potassium chloride (KLOR-CON M) extended release tablet 20 mEq (20 mEq Oral Given 5/28/19 0348)       RE-Evaluation(s):    Time: 0304   Patients symptoms show no change. Repeat physical examination is unchanged.  Offered pain med here but Drove self in and cannot take narcotics due to that. COUNSELING:   I have spoken with the patient and discussed todays results, in addition to providing specific details for the plan of care and counseling regarding the diagnosis and prognosis.     --------------------------------------- IMPRESSION & DISPOSITION --------------------------------     IMPRESSION(s):  1. Acute chest wall pain    2. SOB (shortness of breath)    3. Atrial septal aneurysm    4. Hypokalemia          DISPOSITION:  Disposition:   Patient condition is     NOTE: This report was transcribed using voice recognition software. Every effort was made to ensure accuracy; however, inadvertent computerized transcription errors may be present.              Junella Friday, APRODALIS - CNP  05/28/19 Desiree 26, APRODALIS - CNP  05/28/19 Desiree 26, APRN - CNP  05/28/19 [id number]

## 2019-10-10 ENCOUNTER — APPOINTMENT (OUTPATIENT)
Dept: GENERAL RADIOLOGY | Age: 54
End: 2019-10-10
Payer: COMMERCIAL

## 2019-10-10 ENCOUNTER — HOSPITAL ENCOUNTER (EMERGENCY)
Age: 54
Discharge: HOME | End: 2019-10-10
Attending: EMERGENCY MEDICINE
Payer: COMMERCIAL

## 2019-10-10 ENCOUNTER — APPOINTMENT (OUTPATIENT)
Dept: CT IMAGING | Age: 54
End: 2019-10-10
Payer: COMMERCIAL

## 2019-10-10 VITALS
OXYGEN SATURATION: 99 % | TEMPERATURE: 98.2 F | DIASTOLIC BLOOD PRESSURE: 65 MMHG | SYSTOLIC BLOOD PRESSURE: 109 MMHG | HEIGHT: 64 IN | BODY MASS INDEX: 34.15 KG/M2 | WEIGHT: 200 LBS | HEART RATE: 59 BPM | RESPIRATION RATE: 14 BRPM
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
## 2019-10-10 DIAGNOSIS — M25.551 RIGHT HIP PAIN: ICD-10-CM

## 2019-10-10 DIAGNOSIS — R42 DIZZINESS: ICD-10-CM

## 2019-10-10 LAB
ALBUMIN SERPL-MCNC: 4.3 G/DL (ref 3.5–5.2)
ALP BLD-CCNC: 101 U/L (ref 35–104)
ALT SERPL-CCNC: 12 U/L (ref 0–32)
AMPHETAMINE SCREEN, URINE: NOT DETECTED
ANION GAP SERPL CALCULATED.3IONS-SCNC: 9 MMOL/L (ref 7–16)
AST SERPL-CCNC: 12 U/L (ref 0–31)
BACTERIA: ABNORMAL /HPF
BARBITURATE SCREEN URINE: NOT DETECTED
BASOPHILS ABSOLUTE: 0.05 E9/L (ref 0–0.2)
BASOPHILS RELATIVE PERCENT: 0.6 % (ref 0–2)
BENZODIAZEPINE SCREEN, URINE: NOT DETECTED
BILIRUB SERPL-MCNC: 0.3 MG/DL (ref 0–1.2)
BILIRUBIN URINE: ABNORMAL
BLOOD, URINE: NEGATIVE
BUN BLDV-MCNC: 14 MG/DL (ref 6–20)
CALCIUM SERPL-MCNC: 9.5 MG/DL (ref 8.6–10.2)
CANNABINOID SCREEN URINE: NOT DETECTED
CHLORIDE BLD-SCNC: 100 MMOL/L (ref 98–107)
CLARITY: CLEAR
CO2: 29 MMOL/L (ref 22–29)
COCAINE METABOLITE SCREEN URINE: NOT DETECTED
COLOR: YELLOW
CREAT SERPL-MCNC: 0.8 MG/DL (ref 0.5–1)
CRYSTALS, UA: ABNORMAL
EKG ATRIAL RATE: 51 BPM
EKG P AXIS: 35 DEGREES
EKG P-R INTERVAL: 172 MS
EKG Q-T INTERVAL: 420 MS
EKG QRS DURATION: 78 MS
EKG QTC CALCULATION (BAZETT): 387 MS
EKG R AXIS: 31 DEGREES
EKG T AXIS: 33 DEGREES
EKG VENTRICULAR RATE: 51 BPM
EOSINOPHILS ABSOLUTE: 0.08 E9/L (ref 0.05–0.5)
EOSINOPHILS RELATIVE PERCENT: 1 % (ref 0–6)
EPITHELIAL CELLS, UA: ABNORMAL /HPF
GFR AFRICAN AMERICAN: >60
GFR NON-AFRICAN AMERICAN: >60 ML/MIN/1.73
GLUCOSE BLD-MCNC: 84 MG/DL (ref 74–99)
GLUCOSE URINE: NEGATIVE MG/DL
HCT VFR BLD CALC: 41.2 % (ref 34–48)
HEMOGLOBIN: 12.8 G/DL (ref 11.5–15.5)
IMMATURE GRANULOCYTES #: 0.03 E9/L
IMMATURE GRANULOCYTES %: 0.4 % (ref 0–5)
KETONES, URINE: NEGATIVE MG/DL
LEUKOCYTE ESTERASE, URINE: ABNORMAL
LYMPHOCYTES ABSOLUTE: 1.63 E9/L (ref 1.5–4)
LYMPHOCYTES RELATIVE PERCENT: 20.5 % (ref 20–42)
Lab: NORMAL
MAGNESIUM: 2.1 MG/DL (ref 1.6–2.6)
MCH RBC QN AUTO: 27.8 PG (ref 26–35)
MCHC RBC AUTO-ENTMCNC: 31.1 % (ref 32–34.5)
MCV RBC AUTO: 89.6 FL (ref 80–99.9)
METHADONE SCREEN, URINE: NOT DETECTED
MONOCYTES ABSOLUTE: 0.52 E9/L (ref 0.1–0.95)
MONOCYTES RELATIVE PERCENT: 6.5 % (ref 2–12)
NEUTROPHILS ABSOLUTE: 5.64 E9/L (ref 1.8–7.3)
NEUTROPHILS RELATIVE PERCENT: 71 % (ref 43–80)
NITRITE, URINE: NEGATIVE
OPIATE SCREEN URINE: NOT DETECTED
PDW BLD-RTO: 14.6 FL (ref 11.5–15)
PH UA: 6 (ref 5–9)
PHENCYCLIDINE SCREEN URINE: NOT DETECTED
PLATELET # BLD: 341 E9/L (ref 130–450)
PMV BLD AUTO: 10.2 FL (ref 7–12)
POTASSIUM SERPL-SCNC: 3.8 MMOL/L (ref 3.5–5)
PROPOXYPHENE SCREEN: NOT DETECTED
PROTEIN UA: NEGATIVE MG/DL
RBC # BLD: 4.6 E12/L (ref 3.5–5.5)
RBC UA: ABNORMAL /HPF (ref 0–2)
SODIUM BLD-SCNC: 138 MMOL/L (ref 132–146)
SPECIFIC GRAVITY UA: 1.02 (ref 1–1.03)
TOTAL PROTEIN: 7.4 G/DL (ref 6.4–8.3)
TROPONIN: <0.01 NG/ML (ref 0–0.03)
UROBILINOGEN, URINE: 0.2 E.U./DL
WBC # BLD: 8 E9/L (ref 4.5–11.5)
WBC UA: ABNORMAL /HPF (ref 0–5)

## 2019-10-10 PROCEDURE — 80053 COMPREHEN METABOLIC PANEL: CPT

## 2019-10-10 PROCEDURE — 96374 THER/PROPH/DIAG INJ IV PUSH: CPT

## 2019-10-10 PROCEDURE — 81001 URINALYSIS AUTO W/SCOPE: CPT

## 2019-10-10 PROCEDURE — 84484 ASSAY OF TROPONIN QUANT: CPT

## 2019-10-10 PROCEDURE — 80307 DRUG TEST PRSMV CHEM ANLYZR: CPT

## 2019-10-10 PROCEDURE — 87088 URINE BACTERIA CULTURE: CPT

## 2019-10-10 PROCEDURE — 99284 EMERGENCY DEPT VISIT MOD MDM: CPT

## 2019-10-10 PROCEDURE — 73502 X-RAY EXAM HIP UNI 2-3 VIEWS: CPT

## 2019-10-10 PROCEDURE — 96375 TX/PRO/DX INJ NEW DRUG ADDON: CPT

## 2019-10-10 PROCEDURE — 6360000002 HC RX W HCPCS: Performed by: PHYSICIAN ASSISTANT

## 2019-10-10 PROCEDURE — 93010 ELECTROCARDIOGRAM REPORT: CPT | Performed by: INTERNAL MEDICINE

## 2019-10-10 PROCEDURE — 85025 COMPLETE CBC W/AUTO DIFF WBC: CPT

## 2019-10-10 PROCEDURE — 96372 THER/PROPH/DIAG INJ SC/IM: CPT

## 2019-10-10 PROCEDURE — 72100 X-RAY EXAM L-S SPINE 2/3 VWS: CPT

## 2019-10-10 PROCEDURE — 6370000000 HC RX 637 (ALT 250 FOR IP): Performed by: PHYSICIAN ASSISTANT

## 2019-10-10 PROCEDURE — 93005 ELECTROCARDIOGRAM TRACING: CPT | Performed by: PHYSICIAN ASSISTANT

## 2019-10-10 PROCEDURE — 71045 X-RAY EXAM CHEST 1 VIEW: CPT

## 2019-10-10 PROCEDURE — 2580000003 HC RX 258: Performed by: PHYSICIAN ASSISTANT

## 2019-10-10 PROCEDURE — 83735 ASSAY OF MAGNESIUM: CPT

## 2019-10-10 PROCEDURE — 70450 CT HEAD/BRAIN W/O DYE: CPT

## 2019-10-10 RX ORDER — MORPHINE SULFATE 4 MG/ML
4 INJECTION, SOLUTION INTRAMUSCULAR; INTRAVENOUS ONCE
Status: COMPLETED | OUTPATIENT
Start: 2019-10-10 | End: 2019-10-10

## 2019-10-10 RX ORDER — CYCLOBENZAPRINE HCL 10 MG
10 TABLET ORAL NIGHTLY PRN
Qty: 10 TABLET | Refills: 0 | Status: SHIPPED | OUTPATIENT
Start: 2019-10-10 | End: 2019-10-20

## 2019-10-10 RX ORDER — MECLIZINE HCL 12.5 MG/1
25 TABLET ORAL ONCE
Status: COMPLETED | OUTPATIENT
Start: 2019-10-10 | End: 2019-10-10

## 2019-10-10 RX ORDER — MECLIZINE HYDROCHLORIDE 25 MG/1
25 TABLET ORAL 3 TIMES DAILY PRN
Qty: 30 TABLET | Refills: 0 | Status: SHIPPED | OUTPATIENT
Start: 2019-10-10 | End: 2019-10-20

## 2019-10-10 RX ORDER — NAPROXEN 500 MG/1
500 TABLET ORAL 2 TIMES DAILY WITH MEALS
Qty: 20 TABLET | Refills: 0 | Status: ON HOLD | OUTPATIENT
Start: 2019-10-10 | End: 2019-11-19

## 2019-10-10 RX ORDER — KETOROLAC TROMETHAMINE 30 MG/ML
30 INJECTION, SOLUTION INTRAMUSCULAR; INTRAVENOUS ONCE
Status: COMPLETED | OUTPATIENT
Start: 2019-10-10 | End: 2019-10-10

## 2019-10-10 RX ORDER — DEXAMETHASONE SODIUM PHOSPHATE 10 MG/ML
10 INJECTION INTRAMUSCULAR; INTRAVENOUS ONCE
Status: COMPLETED | OUTPATIENT
Start: 2019-10-10 | End: 2019-10-10

## 2019-10-10 RX ORDER — 0.9 % SODIUM CHLORIDE 0.9 %
1000 INTRAVENOUS SOLUTION INTRAVENOUS ONCE
Status: COMPLETED | OUTPATIENT
Start: 2019-10-10 | End: 2019-10-10

## 2019-10-10 RX ORDER — ORPHENADRINE CITRATE 30 MG/ML
60 INJECTION INTRAMUSCULAR; INTRAVENOUS ONCE
Status: COMPLETED | OUTPATIENT
Start: 2019-10-10 | End: 2019-10-10

## 2019-10-10 RX ORDER — PREDNISONE 20 MG/1
40 TABLET ORAL DAILY
Qty: 10 TABLET | Refills: 0 | Status: SHIPPED | OUTPATIENT
Start: 2019-10-10 | End: 2019-10-15

## 2019-10-10 RX ADMIN — MORPHINE SULFATE 4 MG: 4 INJECTION, SOLUTION INTRAMUSCULAR; INTRAVENOUS at 17:47

## 2019-10-10 RX ADMIN — SODIUM CHLORIDE 1000 ML: 9 INJECTION, SOLUTION INTRAVENOUS at 16:29

## 2019-10-10 RX ADMIN — DEXAMETHASONE SODIUM PHOSPHATE 10 MG: 10 INJECTION INTRAMUSCULAR; INTRAVENOUS at 17:49

## 2019-10-10 RX ADMIN — MECLIZINE 25 MG: 12.5 TABLET ORAL at 18:01

## 2019-10-10 RX ADMIN — KETOROLAC TROMETHAMINE 30 MG: 30 INJECTION, SOLUTION INTRAMUSCULAR at 16:28

## 2019-10-10 RX ADMIN — ORPHENADRINE CITRATE 60 MG: 30 INJECTION INTRAMUSCULAR; INTRAVENOUS at 17:46

## 2019-10-10 ASSESSMENT — PAIN DESCRIPTION - LOCATION: LOCATION: HIP

## 2019-10-10 ASSESSMENT — PAIN SCALES - GENERAL
PAINLEVEL_OUTOF10: 10

## 2019-10-10 ASSESSMENT — PAIN DESCRIPTION - FREQUENCY: FREQUENCY: CONTINUOUS

## 2019-10-10 ASSESSMENT — PAIN DESCRIPTION - ORIENTATION: ORIENTATION: RIGHT

## 2019-10-10 ASSESSMENT — PAIN DESCRIPTION - DESCRIPTORS: DESCRIPTORS: ACHING;THROBBING;SQUEEZING

## 2019-10-11 LAB — URINE CULTURE, ROUTINE: NORMAL

## 2019-11-18 ENCOUNTER — HOSPITAL ENCOUNTER (EMERGENCY)
Age: 54
Discharge: LEFT BEFORE DISCHARGE | End: 2019-11-18
Attending: EMERGENCY MEDICINE
Payer: COMMERCIAL

## 2019-11-18 ENCOUNTER — APPOINTMENT (OUTPATIENT)
Dept: GENERAL RADIOLOGY | Age: 54
End: 2019-11-18
Payer: COMMERCIAL

## 2019-11-18 VITALS
BODY MASS INDEX: 37.39 KG/M2 | HEIGHT: 64 IN | DIASTOLIC BLOOD PRESSURE: 75 MMHG | RESPIRATION RATE: 18 BRPM | WEIGHT: 219 LBS | OXYGEN SATURATION: 96 % | TEMPERATURE: 98 F | HEART RATE: 102 BPM | SYSTOLIC BLOOD PRESSURE: 119 MMHG

## 2019-11-18 DIAGNOSIS — R07.9 CHEST PAIN, UNSPECIFIED TYPE: Primary | ICD-10-CM

## 2019-11-18 DIAGNOSIS — R00.2 HEART PALPITATIONS: ICD-10-CM

## 2019-11-18 LAB
ALBUMIN SERPL-MCNC: 4.3 G/DL (ref 3.5–5.2)
ALP BLD-CCNC: 95 U/L (ref 35–104)
ALT SERPL-CCNC: 14 U/L (ref 0–32)
ANION GAP SERPL CALCULATED.3IONS-SCNC: 12 MMOL/L (ref 7–16)
AST SERPL-CCNC: 18 U/L (ref 0–31)
BASOPHILS ABSOLUTE: 0.04 E9/L (ref 0–0.2)
BASOPHILS RELATIVE PERCENT: 0.6 % (ref 0–2)
BILIRUB SERPL-MCNC: 0.3 MG/DL (ref 0–1.2)
BUN BLDV-MCNC: 20 MG/DL (ref 6–20)
CALCIUM SERPL-MCNC: 9.8 MG/DL (ref 8.6–10.2)
CHLORIDE BLD-SCNC: 108 MMOL/L (ref 98–107)
CO2: 25 MMOL/L (ref 22–29)
CREAT SERPL-MCNC: 0.7 MG/DL (ref 0.5–1)
EOSINOPHILS ABSOLUTE: 0.06 E9/L (ref 0.05–0.5)
EOSINOPHILS RELATIVE PERCENT: 0.8 % (ref 0–6)
GFR AFRICAN AMERICAN: >60
GFR NON-AFRICAN AMERICAN: >60 ML/MIN/1.73
GLUCOSE BLD-MCNC: 97 MG/DL (ref 74–99)
HCT VFR BLD CALC: 41.8 % (ref 34–48)
HEMOGLOBIN: 13 G/DL (ref 11.5–15.5)
IMMATURE GRANULOCYTES #: 0.02 E9/L
IMMATURE GRANULOCYTES %: 0.3 % (ref 0–5)
LYMPHOCYTES ABSOLUTE: 1.71 E9/L (ref 1.5–4)
LYMPHOCYTES RELATIVE PERCENT: 24.1 % (ref 20–42)
MCH RBC QN AUTO: 27.3 PG (ref 26–35)
MCHC RBC AUTO-ENTMCNC: 31.1 % (ref 32–34.5)
MCV RBC AUTO: 87.6 FL (ref 80–99.9)
MONOCYTES ABSOLUTE: 0.41 E9/L (ref 0.1–0.95)
MONOCYTES RELATIVE PERCENT: 5.8 % (ref 2–12)
NEUTROPHILS ABSOLUTE: 4.86 E9/L (ref 1.8–7.3)
NEUTROPHILS RELATIVE PERCENT: 68.4 % (ref 43–80)
PDW BLD-RTO: 14.4 FL (ref 11.5–15)
PLATELET # BLD: 330 E9/L (ref 130–450)
PMV BLD AUTO: 10.6 FL (ref 7–12)
POTASSIUM SERPL-SCNC: 4 MMOL/L (ref 3.5–5)
RBC # BLD: 4.77 E12/L (ref 3.5–5.5)
SODIUM BLD-SCNC: 145 MMOL/L (ref 132–146)
TOTAL PROTEIN: 7.4 G/DL (ref 6.4–8.3)
TROPONIN: <0.01 NG/ML (ref 0–0.03)
WBC # BLD: 7.1 E9/L (ref 4.5–11.5)

## 2019-11-18 PROCEDURE — 6370000000 HC RX 637 (ALT 250 FOR IP): Performed by: PHYSICIAN ASSISTANT

## 2019-11-18 PROCEDURE — 84484 ASSAY OF TROPONIN QUANT: CPT

## 2019-11-18 PROCEDURE — 93005 ELECTROCARDIOGRAM TRACING: CPT | Performed by: PHYSICIAN ASSISTANT

## 2019-11-18 PROCEDURE — 85025 COMPLETE CBC W/AUTO DIFF WBC: CPT

## 2019-11-18 PROCEDURE — 36415 COLL VENOUS BLD VENIPUNCTURE: CPT

## 2019-11-18 PROCEDURE — 99285 EMERGENCY DEPT VISIT HI MDM: CPT

## 2019-11-18 PROCEDURE — 80053 COMPREHEN METABOLIC PANEL: CPT

## 2019-11-18 PROCEDURE — 71046 X-RAY EXAM CHEST 2 VIEWS: CPT

## 2019-11-18 RX ORDER — NITROGLYCERIN 0.4 MG/1
0.4 TABLET SUBLINGUAL EVERY 5 MIN PRN
Status: DISCONTINUED | OUTPATIENT
Start: 2019-11-18 | End: 2019-11-18

## 2019-11-18 RX ORDER — ASPIRIN 325 MG
325 TABLET ORAL ONCE
Status: COMPLETED | OUTPATIENT
Start: 2019-11-18 | End: 2019-11-18

## 2019-11-18 RX ADMIN — ASPIRIN 325 MG ORAL TABLET 325 MG: 325 PILL ORAL at 18:23

## 2019-11-18 ASSESSMENT — PAIN DESCRIPTION - PAIN TYPE: TYPE: ACUTE PAIN

## 2019-11-18 ASSESSMENT — PAIN DESCRIPTION - ORIENTATION: ORIENTATION: LEFT

## 2019-11-18 ASSESSMENT — PAIN DESCRIPTION - LOCATION: LOCATION: CHEST

## 2019-11-18 ASSESSMENT — PAIN SCALES - GENERAL: PAINLEVEL_OUTOF10: 7

## 2019-11-19 ENCOUNTER — HOSPITAL ENCOUNTER (OUTPATIENT)
Age: 54
Setting detail: OBSERVATION
Discharge: HOME | End: 2019-11-20
Attending: EMERGENCY MEDICINE | Admitting: INTERNAL MEDICINE
Payer: COMMERCIAL

## 2019-11-19 DIAGNOSIS — R00.2 PALPITATIONS: Primary | ICD-10-CM

## 2019-11-19 LAB
ALBUMIN SERPL-MCNC: 4.2 G/DL (ref 3.5–5.2)
ALP BLD-CCNC: 94 U/L (ref 35–104)
ALT SERPL-CCNC: 15 U/L (ref 0–32)
ANION GAP SERPL CALCULATED.3IONS-SCNC: 12 MMOL/L (ref 7–16)
AST SERPL-CCNC: 17 U/L (ref 0–31)
BACTERIA: ABNORMAL /HPF
BASOPHILS ABSOLUTE: 0.05 E9/L (ref 0–0.2)
BASOPHILS RELATIVE PERCENT: 0.6 % (ref 0–2)
BILIRUB SERPL-MCNC: 0.5 MG/DL (ref 0–1.2)
BILIRUBIN URINE: ABNORMAL
BLOOD, URINE: NEGATIVE
BUN BLDV-MCNC: 19 MG/DL (ref 6–20)
CALCIUM SERPL-MCNC: 9.8 MG/DL (ref 8.6–10.2)
CHLORIDE BLD-SCNC: 107 MMOL/L (ref 98–107)
CLARITY: CLEAR
CO2: 24 MMOL/L (ref 22–29)
COLOR: YELLOW
CREAT SERPL-MCNC: 0.7 MG/DL (ref 0.5–1)
CRYSTALS, UA: ABNORMAL
EKG ATRIAL RATE: 74 BPM
EKG ATRIAL RATE: 88 BPM
EKG P AXIS: 36 DEGREES
EKG P AXIS: 68 DEGREES
EKG P-R INTERVAL: 162 MS
EKG P-R INTERVAL: 164 MS
EKG Q-T INTERVAL: 350 MS
EKG Q-T INTERVAL: 390 MS
EKG QRS DURATION: 78 MS
EKG QRS DURATION: 86 MS
EKG QTC CALCULATION (BAZETT): 423 MS
EKG QTC CALCULATION (BAZETT): 432 MS
EKG R AXIS: 45 DEGREES
EKG R AXIS: 75 DEGREES
EKG T AXIS: 42 DEGREES
EKG T AXIS: 66 DEGREES
EKG VENTRICULAR RATE: 74 BPM
EKG VENTRICULAR RATE: 88 BPM
EOSINOPHILS ABSOLUTE: 0.13 E9/L (ref 0.05–0.5)
EOSINOPHILS RELATIVE PERCENT: 1.6 % (ref 0–6)
GFR AFRICAN AMERICAN: >60
GFR NON-AFRICAN AMERICAN: >60 ML/MIN/1.73
GLUCOSE BLD-MCNC: 93 MG/DL (ref 74–99)
GLUCOSE URINE: NEGATIVE MG/DL
HCT VFR BLD CALC: 42.2 % (ref 34–48)
HEMOGLOBIN: 13.1 G/DL (ref 11.5–15.5)
IMMATURE GRANULOCYTES #: 0.03 E9/L
IMMATURE GRANULOCYTES %: 0.4 % (ref 0–5)
KETONES, URINE: NEGATIVE MG/DL
LEUKOCYTE ESTERASE, URINE: ABNORMAL
LYMPHOCYTES ABSOLUTE: 1.68 E9/L (ref 1.5–4)
LYMPHOCYTES RELATIVE PERCENT: 20.6 % (ref 20–42)
MCH RBC QN AUTO: 27.5 PG (ref 26–35)
MCHC RBC AUTO-ENTMCNC: 31 % (ref 32–34.5)
MCV RBC AUTO: 88.5 FL (ref 80–99.9)
MONOCYTES ABSOLUTE: 0.48 E9/L (ref 0.1–0.95)
MONOCYTES RELATIVE PERCENT: 5.9 % (ref 2–12)
NEUTROPHILS ABSOLUTE: 5.77 E9/L (ref 1.8–7.3)
NEUTROPHILS RELATIVE PERCENT: 70.9 % (ref 43–80)
NITRITE, URINE: NEGATIVE
PDW BLD-RTO: 14.5 FL (ref 11.5–15)
PH UA: 5.5 (ref 5–9)
PLATELET # BLD: 300 E9/L (ref 130–450)
PMV BLD AUTO: 10.1 FL (ref 7–12)
POTASSIUM REFLEX MAGNESIUM: 3.8 MMOL/L (ref 3.5–5)
PROTEIN UA: NEGATIVE MG/DL
RBC # BLD: 4.77 E12/L (ref 3.5–5.5)
RBC UA: ABNORMAL /HPF (ref 0–2)
SODIUM BLD-SCNC: 143 MMOL/L (ref 132–146)
SPECIFIC GRAVITY UA: >=1.03 (ref 1–1.03)
TOTAL PROTEIN: 7.4 G/DL (ref 6.4–8.3)
TROPONIN: <0.01 NG/ML (ref 0–0.03)
TROPONIN: <0.01 NG/ML (ref 0–0.03)
TSH SERPL DL<=0.05 MIU/L-ACNC: 1.98 UIU/ML (ref 0.27–4.2)
UROBILINOGEN, URINE: 0.2 E.U./DL
WBC # BLD: 8.1 E9/L (ref 4.5–11.5)
WBC UA: ABNORMAL /HPF (ref 0–5)

## 2019-11-19 PROCEDURE — 81001 URINALYSIS AUTO W/SCOPE: CPT

## 2019-11-19 PROCEDURE — 85025 COMPLETE CBC W/AUTO DIFF WBC: CPT

## 2019-11-19 PROCEDURE — 6370000000 HC RX 637 (ALT 250 FOR IP): Performed by: INTERNAL MEDICINE

## 2019-11-19 PROCEDURE — 87088 URINE BACTERIA CULTURE: CPT

## 2019-11-19 PROCEDURE — 84443 ASSAY THYROID STIM HORMONE: CPT

## 2019-11-19 PROCEDURE — 93005 ELECTROCARDIOGRAM TRACING: CPT | Performed by: NURSE PRACTITIONER

## 2019-11-19 PROCEDURE — 6370000000 HC RX 637 (ALT 250 FOR IP): Performed by: NURSE PRACTITIONER

## 2019-11-19 PROCEDURE — 96374 THER/PROPH/DIAG INJ IV PUSH: CPT

## 2019-11-19 PROCEDURE — G0378 HOSPITAL OBSERVATION PER HR: HCPCS

## 2019-11-19 PROCEDURE — 80053 COMPREHEN METABOLIC PANEL: CPT

## 2019-11-19 PROCEDURE — 94760 N-INVAS EAR/PLS OXIMETRY 1: CPT

## 2019-11-19 PROCEDURE — 36415 COLL VENOUS BLD VENIPUNCTURE: CPT

## 2019-11-19 PROCEDURE — 93010 ELECTROCARDIOGRAM REPORT: CPT | Performed by: INTERNAL MEDICINE

## 2019-11-19 PROCEDURE — 99285 EMERGENCY DEPT VISIT HI MDM: CPT

## 2019-11-19 PROCEDURE — 6360000002 HC RX W HCPCS: Performed by: INTERNAL MEDICINE

## 2019-11-19 PROCEDURE — 2580000003 HC RX 258: Performed by: INTERNAL MEDICINE

## 2019-11-19 PROCEDURE — 84484 ASSAY OF TROPONIN QUANT: CPT

## 2019-11-19 PROCEDURE — APPSS60 APP SPLIT SHARED TIME 46-60 MINUTES: Performed by: NURSE PRACTITIONER

## 2019-11-19 RX ORDER — ATORVASTATIN CALCIUM 40 MG/1
40 TABLET, FILM COATED ORAL NIGHTLY
Status: DISCONTINUED | OUTPATIENT
Start: 2019-11-19 | End: 2019-11-20 | Stop reason: HOSPADM

## 2019-11-19 RX ORDER — PANTOPRAZOLE SODIUM 40 MG/1
40 TABLET, DELAYED RELEASE ORAL
Status: DISCONTINUED | OUTPATIENT
Start: 2019-11-20 | End: 2019-11-20 | Stop reason: HOSPADM

## 2019-11-19 RX ORDER — METOPROLOL TARTRATE 50 MG/1
50 TABLET, FILM COATED ORAL 2 TIMES DAILY
Status: DISCONTINUED | OUTPATIENT
Start: 2019-11-19 | End: 2019-11-20 | Stop reason: HOSPADM

## 2019-11-19 RX ORDER — ACETAMINOPHEN 325 MG/1
650 TABLET ORAL EVERY 4 HOURS PRN
Status: DISCONTINUED | OUTPATIENT
Start: 2019-11-19 | End: 2019-11-20 | Stop reason: HOSPADM

## 2019-11-19 RX ORDER — SODIUM CHLORIDE 0.9 % (FLUSH) 0.9 %
10 SYRINGE (ML) INJECTION PRN
Status: DISCONTINUED | OUTPATIENT
Start: 2019-11-19 | End: 2019-11-19 | Stop reason: SDUPTHER

## 2019-11-19 RX ORDER — DULOXETIN HYDROCHLORIDE 60 MG/1
60 CAPSULE, DELAYED RELEASE ORAL DAILY
Status: DISCONTINUED | OUTPATIENT
Start: 2019-11-19 | End: 2019-11-20 | Stop reason: HOSPADM

## 2019-11-19 RX ORDER — SODIUM CHLORIDE 0.9 % (FLUSH) 0.9 %
10 SYRINGE (ML) INJECTION PRN
Status: DISCONTINUED | OUTPATIENT
Start: 2019-11-19 | End: 2019-11-20 | Stop reason: HOSPADM

## 2019-11-19 RX ORDER — ONDANSETRON 2 MG/ML
4 INJECTION INTRAMUSCULAR; INTRAVENOUS EVERY 6 HOURS PRN
Status: DISCONTINUED | OUTPATIENT
Start: 2019-11-19 | End: 2019-11-20 | Stop reason: HOSPADM

## 2019-11-19 RX ORDER — SODIUM CHLORIDE 0.9 % (FLUSH) 0.9 %
10 SYRINGE (ML) INJECTION EVERY 12 HOURS SCHEDULED
Status: DISCONTINUED | OUTPATIENT
Start: 2019-11-19 | End: 2019-11-19 | Stop reason: SDUPTHER

## 2019-11-19 RX ORDER — SODIUM CHLORIDE 0.9 % (FLUSH) 0.9 %
10 SYRINGE (ML) INJECTION EVERY 12 HOURS SCHEDULED
Status: DISCONTINUED | OUTPATIENT
Start: 2019-11-19 | End: 2019-11-20 | Stop reason: HOSPADM

## 2019-11-19 RX ADMIN — ONDANSETRON HYDROCHLORIDE 4 MG: 2 INJECTION, SOLUTION INTRAMUSCULAR; INTRAVENOUS at 20:14

## 2019-11-19 RX ADMIN — DULOXETINE HYDROCHLORIDE 60 MG: 60 CAPSULE, DELAYED RELEASE ORAL at 20:14

## 2019-11-19 RX ADMIN — Medication 10 ML: at 20:14

## 2019-11-19 RX ADMIN — METOPROLOL TARTRATE 50 MG: 50 TABLET, FILM COATED ORAL at 20:14

## 2019-11-19 RX ADMIN — ACETAMINOPHEN 650 MG: 325 TABLET, FILM COATED ORAL at 20:14

## 2019-11-19 RX ADMIN — ATORVASTATIN CALCIUM 40 MG: 40 TABLET, FILM COATED ORAL at 20:14

## 2019-11-19 ASSESSMENT — HEART SCORE: ECG: 1

## 2019-11-19 ASSESSMENT — PAIN SCALES - GENERAL
PAINLEVEL_OUTOF10: 0
PAINLEVEL_OUTOF10: 7
PAINLEVEL_OUTOF10: 7

## 2019-11-19 ASSESSMENT — PAIN DESCRIPTION - LOCATION
LOCATION: HEAD
LOCATION: ABDOMEN

## 2019-11-19 ASSESSMENT — PAIN DESCRIPTION - PAIN TYPE
TYPE: ACUTE PAIN
TYPE: ACUTE PAIN

## 2019-11-19 ASSESSMENT — PAIN DESCRIPTION - DESCRIPTORS: DESCRIPTORS: HEADACHE

## 2019-11-20 ENCOUNTER — APPOINTMENT (OUTPATIENT)
Dept: NON INVASIVE DIAGNOSTICS | Age: 54
End: 2019-11-20
Payer: COMMERCIAL

## 2019-11-20 ENCOUNTER — APPOINTMENT (OUTPATIENT)
Dept: NUCLEAR MEDICINE | Age: 54
End: 2019-11-20
Payer: COMMERCIAL

## 2019-11-20 VITALS
HEIGHT: 64 IN | BODY MASS INDEX: 36.45 KG/M2 | SYSTOLIC BLOOD PRESSURE: 122 MMHG | WEIGHT: 213.5 LBS | OXYGEN SATURATION: 96 % | RESPIRATION RATE: 14 BRPM | DIASTOLIC BLOOD PRESSURE: 65 MMHG | TEMPERATURE: 98 F | HEART RATE: 65 BPM

## 2019-11-20 PROBLEM — R07.89 ATYPICAL CHEST PAIN: Status: ACTIVE | Noted: 2019-11-20

## 2019-11-20 LAB
HCG QUALITATIVE: NEGATIVE
LV EF: 55 %
LVEF MODALITY: NORMAL
TROPONIN: <0.01 NG/ML (ref 0–0.03)

## 2019-11-20 PROCEDURE — 93016 CV STRESS TEST SUPVJ ONLY: CPT | Performed by: INTERNAL MEDICINE

## 2019-11-20 PROCEDURE — 84484 ASSAY OF TROPONIN QUANT: CPT

## 2019-11-20 PROCEDURE — A9500 TC99M SESTAMIBI: HCPCS | Performed by: RADIOLOGY

## 2019-11-20 PROCEDURE — 78452 HT MUSCLE IMAGE SPECT MULT: CPT

## 2019-11-20 PROCEDURE — 93017 CV STRESS TEST TRACING ONLY: CPT

## 2019-11-20 PROCEDURE — 3430000000 HC RX DIAGNOSTIC RADIOPHARMACEUTICAL: Performed by: RADIOLOGY

## 2019-11-20 PROCEDURE — G0378 HOSPITAL OBSERVATION PER HR: HCPCS

## 2019-11-20 PROCEDURE — 93018 CV STRESS TEST I&R ONLY: CPT | Performed by: INTERNAL MEDICINE

## 2019-11-20 PROCEDURE — 6360000002 HC RX W HCPCS: Performed by: NURSE PRACTITIONER

## 2019-11-20 PROCEDURE — 6370000000 HC RX 637 (ALT 250 FOR IP): Performed by: INTERNAL MEDICINE

## 2019-11-20 PROCEDURE — 36415 COLL VENOUS BLD VENIPUNCTURE: CPT

## 2019-11-20 PROCEDURE — 84703 CHORIONIC GONADOTROPIN ASSAY: CPT

## 2019-11-20 RX ORDER — ATORVASTATIN CALCIUM 40 MG/1
40 TABLET, FILM COATED ORAL NIGHTLY
Qty: 30 TABLET | Refills: 3 | Status: SHIPPED | OUTPATIENT
Start: 2019-11-20 | End: 2021-05-24

## 2019-11-20 RX ORDER — PANTOPRAZOLE SODIUM 40 MG/1
40 TABLET, DELAYED RELEASE ORAL
Qty: 30 TABLET | Refills: 3 | Status: SHIPPED | OUTPATIENT
Start: 2019-11-21 | End: 2021-05-24

## 2019-11-20 RX ADMIN — Medication 12 MILLICURIE: at 09:31

## 2019-11-20 RX ADMIN — METOPROLOL TARTRATE 50 MG: 50 TABLET, FILM COATED ORAL at 16:51

## 2019-11-20 RX ADMIN — PANTOPRAZOLE SODIUM 40 MG: 40 TABLET, DELAYED RELEASE ORAL at 06:17

## 2019-11-20 RX ADMIN — REGADENOSON 0.4 MG: 0.08 INJECTION, SOLUTION INTRAVENOUS at 11:37

## 2019-11-20 RX ADMIN — Medication 35 MILLICURIE: at 11:45

## 2019-11-20 ASSESSMENT — PAIN DESCRIPTION - PAIN TYPE: TYPE: ACUTE PAIN

## 2019-11-20 ASSESSMENT — PAIN SCALES - GENERAL: PAINLEVEL_OUTOF10: 7

## 2019-11-20 ASSESSMENT — PAIN DESCRIPTION - LOCATION: LOCATION: ABDOMEN

## 2019-11-21 LAB — URINE CULTURE, ROUTINE: NORMAL

## 2019-12-04 DIAGNOSIS — R07.9 CHEST PAIN, UNSPECIFIED TYPE: Primary | ICD-10-CM

## 2019-12-04 DIAGNOSIS — R06.02 SHORTNESS OF BREATH: ICD-10-CM

## 2019-12-10 ENCOUNTER — TELEPHONE (OUTPATIENT)
Dept: CARDIOLOGY | Age: 54
End: 2019-12-10

## 2020-01-09 ENCOUNTER — TELEPHONE (OUTPATIENT)
Dept: CARDIOLOGY | Age: 55
End: 2020-01-09

## 2020-02-04 ENCOUNTER — TELEPHONE (OUTPATIENT)
Dept: CARDIOLOGY | Age: 55
End: 2020-02-04

## 2020-02-05 ENCOUNTER — HOSPITAL ENCOUNTER (OUTPATIENT)
Dept: CARDIOLOGY | Age: 55
Discharge: HOME | End: 2020-02-05
Payer: COMMERCIAL

## 2020-02-05 LAB
LV EF: 60 %
LVEF MODALITY: NORMAL

## 2020-02-05 PROCEDURE — 93306 TTE W/DOPPLER COMPLETE: CPT | Performed by: PSYCHIATRY & NEUROLOGY

## 2020-02-12 ENCOUNTER — TELEPHONE (OUTPATIENT)
Dept: CARDIOLOGY CLINIC | Age: 55
End: 2020-02-12

## 2020-02-12 NOTE — TELEPHONE ENCOUNTER
Left message for patient to contact office. ----- Message from Birdie Ellis MD sent at 2/12/2020 12:39 PM EST -----    Tell the patient her echo was essentially normal and we are not recommending any further cardiac testing.

## 2021-05-24 ENCOUNTER — APPOINTMENT (OUTPATIENT)
Dept: GENERAL RADIOLOGY | Age: 56
End: 2021-05-24
Payer: COMMERCIAL

## 2021-05-24 ENCOUNTER — HOSPITAL ENCOUNTER (EMERGENCY)
Age: 56
Discharge: HOME | End: 2021-05-24
Attending: EMERGENCY MEDICINE
Payer: COMMERCIAL

## 2021-05-24 VITALS
DIASTOLIC BLOOD PRESSURE: 77 MMHG | SYSTOLIC BLOOD PRESSURE: 123 MMHG | BODY MASS INDEX: 42.68 KG/M2 | OXYGEN SATURATION: 99 % | TEMPERATURE: 97.9 F | WEIGHT: 250 LBS | HEIGHT: 64 IN | RESPIRATION RATE: 16 BRPM | HEART RATE: 56 BPM

## 2021-05-24 DIAGNOSIS — S52.501A CLOSED FRACTURE OF DISTAL END OF RIGHT RADIUS, UNSPECIFIED FRACTURE MORPHOLOGY, INITIAL ENCOUNTER: Primary | ICD-10-CM

## 2021-05-24 PROCEDURE — 73100 X-RAY EXAM OF WRIST: CPT

## 2021-05-24 PROCEDURE — 99285 EMERGENCY DEPT VISIT HI MDM: CPT

## 2021-05-24 PROCEDURE — 6360000002 HC RX W HCPCS: Performed by: EMERGENCY MEDICINE

## 2021-05-24 PROCEDURE — 96374 THER/PROPH/DIAG INJ IV PUSH: CPT

## 2021-05-24 PROCEDURE — 25605 CLTX DST RDL FX/EPHYS SEP W/: CPT

## 2021-05-24 PROCEDURE — 2580000003 HC RX 258: Performed by: EMERGENCY MEDICINE

## 2021-05-24 PROCEDURE — 2500000003 HC RX 250 WO HCPCS: Performed by: EMERGENCY MEDICINE

## 2021-05-24 PROCEDURE — 90471 IMMUNIZATION ADMIN: CPT | Performed by: EMERGENCY MEDICINE

## 2021-05-24 PROCEDURE — 90715 TDAP VACCINE 7 YRS/> IM: CPT | Performed by: EMERGENCY MEDICINE

## 2021-05-24 RX ORDER — FENTANYL CITRATE 50 UG/ML
50 INJECTION, SOLUTION INTRAMUSCULAR; INTRAVENOUS ONCE
Status: COMPLETED | OUTPATIENT
Start: 2021-05-24 | End: 2021-05-24

## 2021-05-24 RX ORDER — MORPHINE SULFATE 4 MG/ML
4 INJECTION, SOLUTION INTRAMUSCULAR; INTRAVENOUS ONCE
Status: DISCONTINUED | OUTPATIENT
Start: 2021-05-24 | End: 2021-05-24

## 2021-05-24 RX ORDER — HYDROCODONE BITARTRATE AND ACETAMINOPHEN 5; 325 MG/1; MG/1
1 TABLET ORAL EVERY 4 HOURS PRN
Qty: 18 TABLET | Refills: 0 | Status: SHIPPED | OUTPATIENT
Start: 2021-05-24 | End: 2021-05-27

## 2021-05-24 RX ORDER — KETAMINE HYDROCHLORIDE 10 MG/ML
100 INJECTION, SOLUTION INTRAMUSCULAR; INTRAVENOUS ONCE
Status: COMPLETED | OUTPATIENT
Start: 2021-05-24 | End: 2021-05-24

## 2021-05-24 RX ORDER — 0.9 % SODIUM CHLORIDE 0.9 %
500 INTRAVENOUS SOLUTION INTRAVENOUS ONCE
Status: COMPLETED | OUTPATIENT
Start: 2021-05-24 | End: 2021-05-24

## 2021-05-24 RX ORDER — PROPOFOL 10 MG/ML
100 INJECTION, EMULSION INTRAVENOUS ONCE
Status: COMPLETED | OUTPATIENT
Start: 2021-05-24 | End: 2021-05-24

## 2021-05-24 RX ORDER — LIDOCAINE HYDROCHLORIDE 10 MG/ML
20 INJECTION, SOLUTION EPIDURAL; INFILTRATION; INTRACAUDAL; PERINEURAL ONCE
Status: COMPLETED | OUTPATIENT
Start: 2021-05-24 | End: 2021-05-24

## 2021-05-24 RX ORDER — LIDOCAINE HYDROCHLORIDE 10 MG/ML
INJECTION, SOLUTION INFILTRATION; PERINEURAL
Status: DISCONTINUED
Start: 2021-05-24 | End: 2021-05-24 | Stop reason: WASHOUT

## 2021-05-24 RX ADMIN — KETAMINE HYDROCHLORIDE 100 MG: 10 INJECTION, SOLUTION INTRAMUSCULAR; INTRAVENOUS at 17:02

## 2021-05-24 RX ADMIN — FENTANYL CITRATE 50 MCG: 0.05 INJECTION, SOLUTION INTRAMUSCULAR; INTRAVENOUS at 15:46

## 2021-05-24 RX ADMIN — TETANUS TOXOID, REDUCED DIPHTHERIA TOXOID AND ACELLULAR PERTUSSIS VACCINE, ADSORBED 0.5 ML: 5; 2.5; 8; 8; 2.5 SUSPENSION INTRAMUSCULAR at 16:05

## 2021-05-24 RX ADMIN — PROPOFOL 100 MG: 10 INJECTION, EMULSION INTRAVENOUS at 16:42

## 2021-05-24 RX ADMIN — SODIUM CHLORIDE 500 ML: 9 INJECTION, SOLUTION INTRAVENOUS at 16:04

## 2021-05-24 RX ADMIN — LIDOCAINE HYDROCHLORIDE 20 ML: 10 INJECTION, SOLUTION EPIDURAL; INFILTRATION; INTRACAUDAL; PERINEURAL at 16:42

## 2021-05-24 ASSESSMENT — PAIN DESCRIPTION - LOCATION
LOCATION: ARM
LOCATION: WRIST

## 2021-05-24 ASSESSMENT — ENCOUNTER SYMPTOMS
ABDOMINAL PAIN: 0
BACK PAIN: 0
VOMITING: 0
TROUBLE SWALLOWING: 0
BLOOD IN STOOL: 0
FACIAL SWELLING: 0
RHINORRHEA: 0
SHORTNESS OF BREATH: 0
NAUSEA: 0
WHEEZING: 0
CONSTIPATION: 0
COUGH: 0
DIARRHEA: 0
SORE THROAT: 0

## 2021-05-24 ASSESSMENT — PAIN SCALES - GENERAL
PAINLEVEL_OUTOF10: 10
PAINLEVEL_OUTOF10: 7
PAINLEVEL_OUTOF10: 10

## 2021-05-24 ASSESSMENT — PAIN DESCRIPTION - ORIENTATION
ORIENTATION: RIGHT
ORIENTATION: RIGHT

## 2021-05-24 ASSESSMENT — PAIN DESCRIPTION - PAIN TYPE
TYPE: ACUTE PAIN
TYPE: ACUTE PAIN

## 2021-05-24 NOTE — ED PROVIDER NOTES
54year-old female presented to ER after having a fall in the stairs. Patient states that she was about to leave for work when she tripped on the stairs and landed on her right wrist.  She stated that she has worsening pain on movement, continuous, no alleviating or exacerbating factor. EMS wrapped her right wrist in the bandages. Patient is having severe difficulty moving her right wrist.  She was given ketamine by the EMS. Review of Systems   Constitutional: Negative for chills and fever. HENT: Negative for facial swelling, rhinorrhea, sore throat and trouble swallowing. Eyes: Negative for visual disturbance. Respiratory: Negative for cough, shortness of breath and wheezing. Gastrointestinal: Negative for abdominal pain, blood in stool, constipation, diarrhea, nausea and vomiting. Endocrine: Negative for polyuria. Genitourinary: Negative for difficulty urinating and hematuria. Musculoskeletal: Positive for myalgias. Negative for back pain. Skin: Positive for wound. Neurological: Negative for dizziness, tremors, syncope, light-headedness, numbness and headaches. Psychiatric/Behavioral: Negative for behavioral problems and sleep disturbance. Physical Exam  Constitutional:       Appearance: Normal appearance. HENT:      Head: Normocephalic and atraumatic. Right Ear: External ear normal.      Left Ear: External ear normal.      Nose: Nose normal.      Mouth/Throat:      Mouth: Mucous membranes are moist.      Pharynx: No oropharyngeal exudate. Eyes:      General: No scleral icterus. Pupils: Pupils are equal, round, and reactive to light. Cardiovascular:      Rate and Rhythm: Normal rate and regular rhythm. Pulses: Normal pulses. Heart sounds: Normal heart sounds. No friction rub. No gallop. Pulmonary:      Effort: Pulmonary effort is normal.      Breath sounds: Normal breath sounds. No wheezing, rhonchi or rales.    Abdominal:      General: Abdomen is flat. Bowel sounds are normal.      Palpations: Abdomen is soft. Musculoskeletal:         General: Normal range of motion. Right wrist: Tenderness present. Cervical back: Normal range of motion and neck supple. Right lower leg: No edema. Left lower leg: No edema. Comments: Right wrist wrapped. Lymphadenopathy:      Cervical: No cervical adenopathy. Skin:     General: Skin is warm. Findings: No lesion or rash. Neurological:      General: No focal deficit present. Mental Status: She is alert and oriented to person, place, and time. Psychiatric:         Mood and Affect: Mood normal.         Behavior: Behavior normal.          Procedures  Please see separate procedure notes    MDM       Patient presents to the ED for right wrist pain. Differential diagnoses included but not limited to fracture, sprain,. Workup in the ED revealed comminuted, displaced intra-articular fracture involving distal radius, displaced ulnar styloid fracture. Patient had a small abrasion to the lateral wrist, fracture was not open. Tetanus was updated. Conscious sedation performed and patient was splinted. Please see procedure notes below. Case was discussed with Dr. Estiven Rubalcava, orthopedics. They will see the patient in the next few days in the office. Advised patient to ice and elevate the arm. Patient continues to be non-toxic on re-evaluation. Findings were discussed with the patient and reasons to immediately return to the ED were articulated to them. They will follow-up with their PMD and ortho.           --------------------------------------------- PAST HISTORY ---------------------------------------------  Past Medical History:  has a past medical history of Atrial septal aneurysm, Gall bladder disease, Herniated disc, Hyperlipidemia, Hypertension, Infectious lymphangitis of deep lymphatics, and Kidney stone.     Past Surgical History:  has a past surgical history that includes with them reasons for immediate return here for re evaluation. They will followup with the on call primary care physician and orthopedic physician by calling their office tomorrow. --------------------------------- ADDITIONAL PROVIDER NOTES ---------------------------------  At this time the patient is without objective evidence of an acute process requiring hospitalization or inpatient management. They have remained hemodynamically stable throughout their entire ED visit and are stable for discharge with outpatient follow-up. The plan has been discussed in detail and they are aware of the specific conditions for emergent return, as well as the importance of follow-up. Discharge Medication List as of 5/24/2021  5:56 PM      START taking these medications    Details   HYDROcodone-acetaminophen (NORCO) 5-325 MG per tablet Take 1 tablet by mouth every 4 hours as needed for Pain for up to 3 days. Intended supply: 3 days. Take lowest dose possible to manage pain, Disp-18 tablet, R-0Print             Diagnosis:  1. Closed fracture of distal end of right radius, unspecified fracture morphology, initial encounter        Disposition:  Patient's disposition: Discharge to home  Patient's condition is stable. Maria A Ruiz MD  Resident  05/24/21 1900    -------------------------------- Conscious Sedation Procedure Note -----------------------------  Patient Name: Emilee Quigley   Medical Record Number: [id number]  Date: 5/24/21   Time: 7:10 PM EDT   Room/Bed: 11/11    Indication: wrist fracture dislocation  Consent: I have discussed with the patient and/or the patient representative the indication, alternatives, and the possible risks and/or complications of the planned procedure and the anesthesia methods. The patient and/or patient representative appear to understand and agree to proceed. Physician Involvement: The attending physician was present and supervising this procedure.     5/24/21     Time: 16:45 (pre-procedure start time)  Pre-Sedation Documentation and Exam: I have personally completed a history, physical exam & review of systems for this patient (see notes). Airway Assessment: normal  Prior History of Anesthesia Complications: none  ASA Classification: Class 2 - A normal healthy patient with mild systemic disease  Sedation/ Anesthesia Plan: intravenous sedation  Medications Used: ketamine intravenously and propofol intravenously  Monitoring and Safety: The patient was placed on a cardiac monitor and vital signs, pulse oximetry and level of consciousness were continuously evaluated throughout the procedure. The patient was closely monitored until recovery from the medications was complete and the patient had returned to baseline status. Respiratory therapy was on standby at all times during the procedure.    ----------------------------------- Post Conscious Sedation Note -----------------------------------  (The following Post Sedation note must be completed)  5/24/21     Time: 16:55 (as per nursing note stop time)  Post-Sedation Vital Signs: Vital signs were reviewed and were stable after the procedure (see flow sheet for vitals)       Post-Sedation Exam: Lungs: clear and Cardiovascular: normal       Complications: none    Electronically Signed by: Ryan Malin DO       PROCEDURE NOTE    5/24/21       Time: 16:45    JOINT  REDUCTION  PROCEDURE  Risks, benefits and alternatives (for applicable procedures below) described. Performed By: Ryan Malin DO. Indication:   Joint dislocation and fracture  Informed consent: The patient was counseled regarding the procedure, it's indications, risks, potential complications and alternatives and any questions were answered. Consent was obtained. .  Location:   right  ankle  Procedure:  Anesthsetic/anesthsia was obtained using a hematoma block of the affected area using 3.0 cc of 1% Lidocaine without epinephrine and conscious sedation -SEE CONSCIOUS SEDATION NOTE FOR DETAILS. Attempted reduction was performed by direct traction. Patient tolerated the procedure well. Post-reduction XR's:  were obtained and revealed partial but satisfactory reduction. Orthopaedic Consultation:  Yes. Pulses present after reduction. Patient able to wiggle fingers.                Carlos Colmenares DO  05/24/21 1914

## 2021-05-24 NOTE — ED NOTES
Bed: 11  Expected date:   Expected time:   Means of arrival:   Comments:  EMS RIGHT ARM DEFORMED     Hiren Trammell RN  05/24/21 1937

## 2021-08-09 ENCOUNTER — OFFICE VISIT (OUTPATIENT)
Dept: PRIMARY CARE CLINIC | Age: 56
End: 2021-08-09
Payer: COMMERCIAL

## 2021-08-09 VITALS
HEART RATE: 61 BPM | SYSTOLIC BLOOD PRESSURE: 126 MMHG | TEMPERATURE: 97.2 F | WEIGHT: 250 LBS | BODY MASS INDEX: 42.91 KG/M2 | OXYGEN SATURATION: 97 % | DIASTOLIC BLOOD PRESSURE: 82 MMHG

## 2021-08-09 DIAGNOSIS — I10 ESSENTIAL HYPERTENSION: Primary | ICD-10-CM

## 2021-08-09 DIAGNOSIS — R05.9 COUGH: ICD-10-CM

## 2021-08-09 DIAGNOSIS — E78.5 HYPERLIPIDEMIA, UNSPECIFIED HYPERLIPIDEMIA TYPE: ICD-10-CM

## 2021-08-09 DIAGNOSIS — F32.A DEPRESSION, UNSPECIFIED DEPRESSION TYPE: ICD-10-CM

## 2021-08-09 PROCEDURE — 3017F COLORECTAL CA SCREEN DOC REV: CPT | Performed by: FAMILY MEDICINE

## 2021-08-09 PROCEDURE — G8417 CALC BMI ABV UP PARAM F/U: HCPCS | Performed by: FAMILY MEDICINE

## 2021-08-09 PROCEDURE — G8428 CUR MEDS NOT DOCUMENT: HCPCS | Performed by: FAMILY MEDICINE

## 2021-08-09 PROCEDURE — 99203 OFFICE O/P NEW LOW 30 MIN: CPT | Performed by: FAMILY MEDICINE

## 2021-08-09 PROCEDURE — 1036F TOBACCO NON-USER: CPT | Performed by: FAMILY MEDICINE

## 2021-08-09 RX ORDER — METHOCARBAMOL 750 MG/1
TABLET ORAL
COMMUNITY
Start: 2021-07-17 | End: 2021-11-01 | Stop reason: SDUPTHER

## 2021-08-09 RX ORDER — ARIPIPRAZOLE 5 MG/1
5 TABLET ORAL DAILY
COMMUNITY
End: 2021-12-10 | Stop reason: SDUPTHER

## 2021-08-09 RX ORDER — MELOXICAM 15 MG/1
TABLET ORAL
COMMUNITY
Start: 2021-07-19 | End: 2021-10-01 | Stop reason: SDUPTHER

## 2021-08-09 RX ORDER — SODIUM PICOSULFATE, MAGNESIUM OXIDE, AND ANHYDROUS CITRIC ACID 10; 3.5; 12 MG/16.1G; G/16.1G; G/16.1G
POWDER, METERED ORAL
COMMUNITY
Start: 2016-03-08 | End: 2021-11-05 | Stop reason: CLARIF

## 2021-08-09 RX ORDER — ATORVASTATIN CALCIUM 40 MG/1
TABLET, FILM COATED ORAL
COMMUNITY
Start: 2021-05-26 | End: 2021-12-10 | Stop reason: SDUPTHER

## 2021-08-09 RX ORDER — NAPROXEN 500 MG/1
TABLET ORAL
COMMUNITY
Start: 2021-07-21

## 2021-08-09 RX ORDER — HYDROCHLOROTHIAZIDE 25 MG/1
TABLET ORAL
COMMUNITY
Start: 2021-03-16 | End: 2021-12-10 | Stop reason: SDUPTHER

## 2021-08-09 RX ORDER — MECLIZINE HYDROCHLORIDE 25 MG/1
TABLET ORAL
COMMUNITY
Start: 2021-07-14

## 2021-08-09 ASSESSMENT — COLUMBIA-SUICIDE SEVERITY RATING SCALE - C-SSRS
1. WITHIN THE PAST MONTH, HAVE YOU WISHED YOU WERE DEAD OR WISHED YOU COULD GO TO SLEEP AND NOT WAKE UP?: YES
6. HAVE YOU EVER DONE ANYTHING, STARTED TO DO ANYTHING, OR PREPARED TO DO ANYTHING TO END YOUR LIFE?: NO
2. HAVE YOU ACTUALLY HAD ANY THOUGHTS OF KILLING YOURSELF?: NO

## 2021-08-09 ASSESSMENT — ENCOUNTER SYMPTOMS
COUGH: 1
SHORTNESS OF BREATH: 0
CHEST TIGHTNESS: 0
COLOR CHANGE: 0
DIARRHEA: 0
BLOOD IN STOOL: 0
WHEEZING: 0
APNEA: 0
SINUS PRESSURE: 0
VOMITING: 0
PHOTOPHOBIA: 0
NAUSEA: 0
ABDOMINAL PAIN: 0
ALLERGIC/IMMUNOLOGIC NEGATIVE: 1
SORE THROAT: 0
BACK PAIN: 0
FACIAL SWELLING: 0

## 2021-08-09 ASSESSMENT — PATIENT HEALTH QUESTIONNAIRE - PHQ9
8. MOVING OR SPEAKING SO SLOWLY THAT OTHER PEOPLE COULD HAVE NOTICED. OR THE OPPOSITE, BEING SO FIGETY OR RESTLESS THAT YOU HAVE BEEN MOVING AROUND A LOT MORE THAN USUAL: 0
2. FEELING DOWN, DEPRESSED OR HOPELESS: 3
1. LITTLE INTEREST OR PLEASURE IN DOING THINGS: 0
7. TROUBLE CONCENTRATING ON THINGS, SUCH AS READING THE NEWSPAPER OR WATCHING TELEVISION: 3
5. POOR APPETITE OR OVEREATING: 3
10. IF YOU CHECKED OFF ANY PROBLEMS, HOW DIFFICULT HAVE THESE PROBLEMS MADE IT FOR YOU TO DO YOUR WORK, TAKE CARE OF THINGS AT HOME, OR GET ALONG WITH OTHER PEOPLE: 3
3. TROUBLE FALLING OR STAYING ASLEEP: 3
SUM OF ALL RESPONSES TO PHQ QUESTIONS 1-9: 19
4. FEELING TIRED OR HAVING LITTLE ENERGY: 3
9. THOUGHTS THAT YOU WOULD BE BETTER OFF DEAD, OR OF HURTING YOURSELF: 1
6. FEELING BAD ABOUT YOURSELF - OR THAT YOU ARE A FAILURE OR HAVE LET YOURSELF OR YOUR FAMILY DOWN: 3
SUM OF ALL RESPONSES TO PHQ QUESTIONS 1-9: 19
SUM OF ALL RESPONSES TO PHQ9 QUESTIONS 1 & 2: 3
SUM OF ALL RESPONSES TO PHQ QUESTIONS 1-9: 18

## 2021-08-09 NOTE — PROGRESS NOTES
Chief Complaint:   Chief Complaint   Patient presents with    New Patient       Hyperlipidemia  This is a chronic problem. The current episode started more than 1 year ago. The problem is controlled. Recent lipid tests were reviewed and are normal. Pertinent negatives include no chest pain, myalgias or shortness of breath. Current antihyperlipidemic treatment includes statins. The current treatment provides significant improvement of lipids. There are no compliance problems. Hypertension  This is a chronic problem. Pertinent negatives include no chest pain, headaches, palpitations or shortness of breath. Past treatments include diuretics and beta blockers. The current treatment provides significant improvement. There are no compliance problems. Cough  This is a new problem. The current episode started in the past 7 days. The problem has been unchanged. The cough is non-productive. Pertinent negatives include no chest pain, headaches, myalgias, rash, sore throat, shortness of breath or wheezing.        Patient Active Problem List   Diagnosis    Palpitations    Atypical chest pain       Past Medical History:   Diagnosis Date    Atrial septal aneurysm     Gall bladder disease     Herniated disc     Hyperlipidemia     Hypertension     Infectious lymphangitis of deep lymphatics     Kidney stone        Past Surgical History:   Procedure Laterality Date     SECTION      WISDOM TOOTH EXTRACTION         Current Outpatient Medications   Medication Sig Dispense Refill    atorvastatin (LIPITOR) 40 MG tablet TAKE 1 TABLET BY MOUTH EVERY DAY      ARIPiprazole (ABILIFY) 5 MG tablet       D3-50 1.25 MG (09439 UT) CAPS TAKE 1 CAPSULE BY MOUTH EVERY WEEK      hydroCHLOROthiazide (HYDRODIURIL) 25 MG tablet TAKE 1 TABLET BY MOUTH EVERY DAY      meclizine (ANTIVERT) 25 MG tablet TAKE 1 TABLET BY MOUTH EVERY 8 HOURS AS NEEDED      meloxicam (MOBIC) 15 MG tablet TAKE 1 TABLET BY MOUTH EVERY DAY      naproxen (NAPROSYN) 500 MG tablet TAKE 1 TABLET BY MOUTH EVERY 12 HOURS      diclofenac sodium (VOLTAREN) 1 % GEL APPLY TO AFFECTED AREA THREE TIMES PER DAY      Sod Picosulfate-Mag Ox-Cit Acd (PREPOPIK) 10-3.5-12 MG-GM-GM PACK       metoprolol (LOPRESSOR) 50 MG tablet 2 times daily.  duloxetine (CYMBALTA) 60 MG capsule Take 60 mg by mouth daily. No current facility-administered medications for this visit. No Known Allergies    Social History     Socioeconomic History    Marital status: Legally      Spouse name: Not on file    Number of children: Not on file    Years of education: Not on file    Highest education level: Not on file   Occupational History    Not on file   Tobacco Use    Smoking status: Never Smoker    Smokeless tobacco: Never Used   Vaping Use    Vaping Use: Never used   Substance and Sexual Activity    Alcohol use: No    Drug use: No    Sexual activity: Not on file   Other Topics Concern    Not on file   Social History Narrative    Not on file     Social Determinants of Health     Financial Resource Strain:     Difficulty of Paying Living Expenses:    Food Insecurity:     Worried About Running Out of Food in the Last Year:     [address] in the Last Year:    Transportation Needs:     Lack of Transportation (Medical):      Lack of Transportation (Non-Medical):    Physical Activity:     Days of Exercise per Week:     Minutes of Exercise per Session:    Stress:     Feeling of Stress :    Social Connections:     Frequency of Communication with Friends and Family:     Frequency of Social Gatherings with Friends and Family:     Attends Sabian[employer] Services:     Active Member of Clubs or Organizations:     Attends Club or Organization Meetings:     Marital Status:    Intimate Partner Violence:     Fear of Current or Ex-Partner:     Emotionally Abused:     Physically Abused:     Sexually Abused:        Family History   Problem Relation Age of Onset    Colon Cancer Father     Cancer Sister     Heart Disease Brother     Arthritis Brother     Hypertension Mother     Arthritis Mother     Heart Disease Mother          Review of Systems   Constitutional: Negative. HENT: Negative for congestion, facial swelling, hearing loss, nosebleeds, sinus pressure and sore throat. Eyes: Negative for photophobia and visual disturbance. Respiratory: Positive for cough. Negative for apnea, chest tightness, shortness of breath and wheezing. Cardiovascular: Negative for chest pain, palpitations and leg swelling. Gastrointestinal: Negative for abdominal pain, blood in stool, diarrhea, nausea and vomiting. Genitourinary: Negative for difficulty urinating, frequency and urgency. Musculoskeletal: Negative for arthralgias, back pain, joint swelling and myalgias. Skin: Negative for color change and rash. Allergic/Immunologic: Negative. Neurological: Negative for syncope, weakness, light-headedness and headaches. Hematological: Negative. Psychiatric/Behavioral: Negative for agitation, behavioral problems, confusion and self-injury. The patient is not nervous/anxious. All other systems reviewed and are negative. Physical Exam  Vitals and nursing note reviewed. Constitutional:       General: She is not in acute distress. Appearance: She is well-developed. HENT:      Head: Normocephalic and atraumatic. Nose: Nose normal.   Eyes:      Conjunctiva/sclera: Conjunctivae normal.      Pupils: Pupils are equal, round, and reactive to light. Neck:      Thyroid: No thyromegaly. Vascular: No JVD. Cardiovascular:      Rate and Rhythm: Normal rate and regular rhythm. Heart sounds: Normal heart sounds. No murmur heard. No friction rub. No gallop. Pulmonary:      Effort: Pulmonary effort is normal. No respiratory distress. Breath sounds: Normal breath sounds. No wheezing.    Abdominal:      General: Bowel sounds are normal. There is no distension. Palpations: Abdomen is soft. Tenderness: There is no abdominal tenderness. There is no guarding or rebound. Musculoskeletal:         General: Normal range of motion. Cervical back: Normal range of motion and neck supple. Lymphadenopathy:      Cervical: No cervical adenopathy. Skin:     General: Skin is warm and dry. Findings: No erythema or rash. Neurological:      Mental Status: She is alert and oriented to person, place, and time. Cranial Nerves: No cranial nerve deficit. Motor: No abnormal muscle tone. Coordination: Coordination normal.      Deep Tendon Reflexes: Reflexes are normal and symmetric. Psychiatric:         Behavior: Behavior normal.         Judgment: Judgment normal.                               ASSESSMENT/PLAN:    Rosaline Son was seen today for new patient. Diagnoses and all orders for this visit:    Essential hypertension  -     CBC Auto Differential; Future  -     Comprehensive Metabolic Panel; Future  -     Lipid Panel; Future  -     TSH without Reflex; Future    Hyperlipidemia, unspecified hyperlipidemia type  -     CBC Auto Differential; Future  -     Comprehensive Metabolic Panel; Future  -     Lipid Panel; Future  -     TSH without Reflex; Future    Depression, unspecified depression type    Cough  -     XR CHEST (2 VW);  Future      F/u to f/u counselor      Doris Coleman DO    8/9/2021  11:58 AM

## 2021-08-11 ENCOUNTER — TELEPHONE (OUTPATIENT)
Dept: PRIMARY CARE CLINIC | Age: 56
End: 2021-08-11

## 2021-08-11 NOTE — TELEPHONE ENCOUNTER
Kia Barron presents today for   Chief Complaint   Patient presents with    Cough     productive, sinus drainage    Chills    Spasms     back       Kia Barron preferred language for health care discussion is english/other. Is someone accompanying this pt? no    Is the patient using any DME equipment during [address]? no    Depression Screening:  3 most recent PHQ Screens 8/15/2018   Little interest or pleasure in doing things Not at all   Feeling down, depressed, irritable, or hopeless Not at all   Total Score PHQ 2 0       Learning Assessment:  Learning Assessment 8/15/2018   PRIMARY LEARNER Patient   HIGHEST LEVEL OF EDUCATION - PRIMARY LEARNER  -   BARRIERS PRIMARY LEARNER -   CO-LEARNER CAREGIVER -   PRIMARY LANGUAGE ENGLISH   LEARNER PREFERENCE PRIMARY READING   ANSWERED BY patient   RELATIONSHIP SELF       Abuse Screening:  Abuse Screening Questionnaire 8/15/2018   Do you ever feel afraid of your partner? N   Are you in a relationship with someone who physically or mentally threatens you? N   Is it safe for you to go home? Y       Generalized Anxiety  No flowsheet data found. Health Maintenance Due   Topic Date Due    DTaP/Tdap/Td series (1 - Tdap) 08/18/1998    Influenza Age 5 to Adult  08/01/2019   . Health Maintenance reviewed and discussed and ordered per Provider. Spoke with patient, we still have not received form from [address] [place] [address]. Gave fax number of [phone] to have them fax again. DVT ppx: hep  Diet: dysphagia 1 pending repeat dysphagia screen once pt cooperative  PT eval

## 2021-09-29 ENCOUNTER — NURSE TRIAGE (OUTPATIENT)
Dept: OTHER | Facility: CLINIC | Age: 56
End: 2021-09-29

## 2021-09-29 NOTE — TELEPHONE ENCOUNTER
Reason for Disposition   No answer. First attempt to contact caller. Follow-up call scheduled within 15 minutes. Protocols used: NO CONTACT OR DUPLICATE CONTACT CALL-ADULT-[place]    Received call from [facility] but patient hung up prior to call transfer. Per ECC, pt was complaining of dizziness and shortness of breath. Pt told ECC that for one week, when she lays back in recliner, she feels her hear stop and can't catch her breath.   Call attempts:   1147: unidentified VM, no msg left  1148: unidentified VM, no msg left   1150:  unidentified VM, no msg left  1226: unidentified VM, no msg left

## 2021-09-30 ENCOUNTER — NURSE TRIAGE (OUTPATIENT)
Dept: OTHER | Facility: CLINIC | Age: 56
End: 2021-09-30

## 2021-09-30 NOTE — TELEPHONE ENCOUNTER
To triage, no contact made, caller gone before transfer from [facility] ([facility]) agent.       Reason for Disposition   Caller has cancelled the call before the first contact    Protocols used: NO CONTACT OR DUPLICATE CONTACT CALL-ADULT-

## 2021-10-01 RX ORDER — MELOXICAM 15 MG/1
TABLET ORAL
Qty: 30 TABLET | Refills: 1 | Status: SHIPPED
Start: 2021-10-01 | End: 2021-12-10 | Stop reason: SDUPTHER

## 2021-10-01 NOTE — TELEPHONE ENCOUNTER
----- Message from Tod Rowan sent at 9/30/2021  4:37 PM EDT -----  Subject: Refill Request    QUESTIONS  Name of Medication? meloxicam (MOBIC) 15 MG tablet  Patient-reported dosage and instructions? once a day  How many days do you have left? 6  Preferred Pharmacy? [facility] #[id number]  Pharmacy phone number (if available)? [phone]  Additional Information for Provider? pt requesting refill   ---------------------------------------------------------------------------  --------------  CALL BACK INFO  What is the best way for the office to contact you? OK to leave message on   voicemail  Preferred Call Back Phone Number?  [phone]

## 2021-10-05 ENCOUNTER — TELEPHONE (OUTPATIENT)
Dept: PRIMARY CARE CLINIC | Age: 56
End: 2021-10-05

## 2021-10-05 DIAGNOSIS — E55.9 VITAMIN D DEFICIENCY: Primary | ICD-10-CM

## 2021-10-29 ENCOUNTER — TELEPHONE (OUTPATIENT)
Dept: PRIMARY CARE CLINIC | Age: 56
End: 2021-10-29

## 2021-10-29 DIAGNOSIS — I25.3 ATRIAL SEPTAL ANEURYSM: Primary | ICD-10-CM

## 2021-11-01 ENCOUNTER — TELEPHONE (OUTPATIENT)
Dept: ADMINISTRATIVE | Age: 56
End: 2021-11-01

## 2021-11-01 RX ORDER — METHOCARBAMOL 750 MG/1
TABLET ORAL
Qty: 12 CAPSULE | Refills: 0 | Status: SHIPPED
Start: 2021-11-01 | End: 2021-12-10 | Stop reason: SDUPTHER

## 2021-11-01 NOTE — TELEPHONE ENCOUNTER
NP scheduled from the [facility]. Pt req, 1st available. Patient Appointment Form:      PCP: Dr. Jaden Marcano  Referring: Dr. Jaden Marcano    Has the Patient:    Seen a Cardiologist? Yes Dr. Rachid Toledo     Had a heart catheterization? No    Had heart surgery? No    Had a stress test or nuclear stress test? Yes 2019 EPic and prior     Had an echocardiogram? 2/5/20 [facility] and prior     Had a vascular ultrasound? No    Had a 24/48 heart monitor or extended cardiac event monitor? 2019 Epic     Had recent blood work in the last 6 months? Yes 10/12/21 PCP Epic     Had a pacemaker/ICD/ILR implant? No    Seen an Electrophysiologist? No        Will send records via:  All recs in Epic       Date & time of appointment:  11/5/21 @ 8:00 with Dr. Scooby Vela

## 2021-11-05 ENCOUNTER — OFFICE VISIT (OUTPATIENT)
Dept: CARDIOLOGY CLINIC | Age: 56
End: 2021-11-05
Payer: COMMERCIAL

## 2021-11-05 VITALS
BODY MASS INDEX: 42.3 KG/M2 | HEIGHT: 64 IN | HEART RATE: 68 BPM | SYSTOLIC BLOOD PRESSURE: 144 MMHG | DIASTOLIC BLOOD PRESSURE: 92 MMHG | WEIGHT: 247.8 LBS | RESPIRATION RATE: 16 BRPM

## 2021-11-05 DIAGNOSIS — R00.2 PALPITATIONS: ICD-10-CM

## 2021-11-05 DIAGNOSIS — R07.89 ATYPICAL CHEST PAIN: Primary | ICD-10-CM

## 2021-11-05 DIAGNOSIS — I25.3 ATRIAL SEPTAL ANEURYSM: ICD-10-CM

## 2021-11-05 PROCEDURE — 99204 OFFICE O/P NEW MOD 45 MIN: CPT | Performed by: INTERNAL MEDICINE

## 2021-11-05 PROCEDURE — 3017F COLORECTAL CA SCREEN DOC REV: CPT | Performed by: INTERNAL MEDICINE

## 2021-11-05 PROCEDURE — 1036F TOBACCO NON-USER: CPT | Performed by: INTERNAL MEDICINE

## 2021-11-05 PROCEDURE — G8484 FLU IMMUNIZE NO ADMIN: HCPCS | Performed by: INTERNAL MEDICINE

## 2021-11-05 PROCEDURE — G8427 DOCREV CUR MEDS BY ELIG CLIN: HCPCS | Performed by: INTERNAL MEDICINE

## 2021-11-05 PROCEDURE — G8417 CALC BMI ABV UP PARAM F/U: HCPCS | Performed by: INTERNAL MEDICINE

## 2021-11-05 PROCEDURE — 93000 ELECTROCARDIOGRAM COMPLETE: CPT | Performed by: INTERNAL MEDICINE

## 2021-11-05 NOTE — PROGRESS NOTES
Insecurity:     Worried About Running Out of Food in the Last Year:     [address] in the Last Year:    Transportation Needs:     Lack of Transportation (Medical):  Lack of Transportation (Non-Medical):    Physical Activity:     Days of Exercise per Week:     Minutes of Exercise per Session:    Stress:     Feeling of Stress :    Social Connections:     Frequency of Communication with Friends and Family:     Frequency of Social Gatherings with Friends and Family:     Attends [employer] Services:     Active Member of Clubs or Organizations:     Attends Club or Organization Meetings:     Marital Status:    Intimate Partner Violence:     Fear of Current or Ex-Partner:     Emotionally Abused:     Physically Abused:     Sexually Abused:        Family History   Problem Relation Age of Onset    Colon Cancer Father     Cancer Sister     Heart Disease Brother     Arthritis Brother     Hypertension Mother     Arthritis Mother     Heart Disease Mother          SUBJECTIVE: Hardy Mckenzie presents to the office today for consult - dr David Montiel - for heart evaluation. Has been seen over the years by edwin rogers and ani and [facility] - I reviewed all notes and actual 2018 echo images. She has complianed at various times of sob, fatigue, palpitations and chest pain . Review of Systems:   Heart: as above   Lungs: as above   Eyes: denies changes in vision or discharge. Ears: denies changes in hearing or pain. Nose: denies epistaxis or masses   Throat: denies sore throat or trouble swallowing. Neuro: denies numbness, tingling, tremors. Skin: denies rashes or itching. : denies hematuria, dysuria   GI: denies vomiting, diarrhea   Psych: denies mood changed, anxiety, depression. all others negative. Physical Exam   BP (!) 144/92   Pulse 68   Resp 16   Ht 5' 4\" (1.626 m)   Wt 247 lb 12.8 oz (112.4 kg)   BMI 42.53 kg/m²   Constitutional: Oriented to person, place, and time.  Obese No

## 2021-11-29 ENCOUNTER — TELEPHONE (OUTPATIENT)
Dept: PRIMARY CARE CLINIC | Age: 56
End: 2021-11-29

## 2021-11-29 DIAGNOSIS — R73.03 PREDIABETES: ICD-10-CM

## 2021-11-29 DIAGNOSIS — I10 ESSENTIAL HYPERTENSION: Primary | ICD-10-CM

## 2021-11-29 DIAGNOSIS — E55.9 VITAMIN D DEFICIENCY: ICD-10-CM

## 2021-11-29 DIAGNOSIS — E78.5 HYPERLIPIDEMIA, UNSPECIFIED HYPERLIPIDEMIA TYPE: ICD-10-CM

## 2021-11-29 NOTE — TELEPHONE ENCOUNTER
Pt has 4m follow up and asking for her labs to be ordered.  She would like Vit D, routine labs and A1C(she is not diabetic) but wants it for urinary frequency

## 2021-12-06 DIAGNOSIS — R73.03 PREDIABETES: ICD-10-CM

## 2021-12-06 DIAGNOSIS — E78.5 HYPERLIPIDEMIA, UNSPECIFIED HYPERLIPIDEMIA TYPE: ICD-10-CM

## 2021-12-06 DIAGNOSIS — E55.9 VITAMIN D DEFICIENCY: ICD-10-CM

## 2021-12-06 DIAGNOSIS — I10 ESSENTIAL HYPERTENSION: ICD-10-CM

## 2021-12-06 LAB
ALBUMIN SERPL-MCNC: 4.6 G/DL (ref 3.5–5.2)
ALP BLD-CCNC: 111 U/L (ref 35–104)
ALT SERPL-CCNC: 16 U/L (ref 0–32)
ANION GAP SERPL CALCULATED.3IONS-SCNC: 15 MMOL/L (ref 7–16)
AST SERPL-CCNC: 16 U/L (ref 0–31)
BASOPHILS ABSOLUTE: 0.04 E9/L (ref 0–0.2)
BASOPHILS RELATIVE PERCENT: 0.5 % (ref 0–2)
BILIRUB SERPL-MCNC: 0.5 MG/DL (ref 0–1.2)
BUN BLDV-MCNC: 14 MG/DL (ref 6–20)
CALCIUM SERPL-MCNC: 9.8 MG/DL (ref 8.6–10.2)
CHLORIDE BLD-SCNC: 104 MMOL/L (ref 98–107)
CHOLESTEROL, TOTAL: 219 MG/DL (ref 0–199)
CO2: 24 MMOL/L (ref 22–29)
CREAT SERPL-MCNC: 0.6 MG/DL (ref 0.5–1)
EOSINOPHILS ABSOLUTE: 0.05 E9/L (ref 0.05–0.5)
EOSINOPHILS RELATIVE PERCENT: 0.7 % (ref 0–6)
GFR AFRICAN AMERICAN: >60
GFR NON-AFRICAN AMERICAN: >60 ML/MIN/1.73
GLUCOSE BLD-MCNC: 95 MG/DL (ref 74–99)
HBA1C MFR BLD: 6 % (ref 4–5.6)
HCT VFR BLD CALC: 42 % (ref 34–48)
HDLC SERPL-MCNC: 52 MG/DL
HEMOGLOBIN: 13.1 G/DL (ref 11.5–15.5)
IMMATURE GRANULOCYTES #: 0.01 E9/L
IMMATURE GRANULOCYTES %: 0.1 % (ref 0–5)
LDL CHOLESTEROL CALCULATED: 138 MG/DL (ref 0–99)
LYMPHOCYTES ABSOLUTE: 2.12 E9/L (ref 1.5–4)
LYMPHOCYTES RELATIVE PERCENT: 27.9 % (ref 20–42)
MCH RBC QN AUTO: 27.8 PG (ref 26–35)
MCHC RBC AUTO-ENTMCNC: 31.2 % (ref 32–34.5)
MCV RBC AUTO: 89.2 FL (ref 80–99.9)
MONOCYTES ABSOLUTE: 0.39 E9/L (ref 0.1–0.95)
MONOCYTES RELATIVE PERCENT: 5.1 % (ref 2–12)
NEUTROPHILS ABSOLUTE: 4.98 E9/L (ref 1.8–7.3)
NEUTROPHILS RELATIVE PERCENT: 65.7 % (ref 43–80)
PDW BLD-RTO: 14 FL (ref 11.5–15)
PLATELET # BLD: 355 E9/L (ref 130–450)
PMV BLD AUTO: 10.5 FL (ref 7–12)
POTASSIUM SERPL-SCNC: 4.4 MMOL/L (ref 3.5–5)
RBC # BLD: 4.71 E12/L (ref 3.5–5.5)
SODIUM BLD-SCNC: 143 MMOL/L (ref 132–146)
TOTAL PROTEIN: 7.3 G/DL (ref 6.4–8.3)
TRIGL SERPL-MCNC: 143 MG/DL (ref 0–149)
TSH SERPL DL<=0.05 MIU/L-ACNC: 1.79 UIU/ML (ref 0.27–4.2)
VITAMIN D 25-HYDROXY: 29 NG/ML (ref 30–100)
VLDLC SERPL CALC-MCNC: 29 MG/DL
WBC # BLD: 7.6 E9/L (ref 4.5–11.5)

## 2021-12-08 ENCOUNTER — OFFICE VISIT (OUTPATIENT)
Dept: PRIMARY CARE CLINIC | Age: 56
End: 2021-12-08
Payer: COMMERCIAL

## 2021-12-08 VITALS
TEMPERATURE: 97.2 F | RESPIRATION RATE: 18 BRPM | HEIGHT: 64 IN | OXYGEN SATURATION: 100 % | BODY MASS INDEX: 41.32 KG/M2 | WEIGHT: 242 LBS | HEART RATE: 73 BPM | DIASTOLIC BLOOD PRESSURE: 74 MMHG | SYSTOLIC BLOOD PRESSURE: 124 MMHG

## 2021-12-08 DIAGNOSIS — R06.02 SOB (SHORTNESS OF BREATH): Primary | ICD-10-CM

## 2021-12-08 DIAGNOSIS — I10 ESSENTIAL HYPERTENSION: ICD-10-CM

## 2021-12-08 DIAGNOSIS — E78.5 HYPERLIPIDEMIA, UNSPECIFIED HYPERLIPIDEMIA TYPE: ICD-10-CM

## 2021-12-08 DIAGNOSIS — E11.9 TYPE 2 DIABETES MELLITUS WITHOUT COMPLICATION, WITHOUT LONG-TERM CURRENT USE OF INSULIN (HCC): ICD-10-CM

## 2021-12-08 PROCEDURE — 99214 OFFICE O/P EST MOD 30 MIN: CPT | Performed by: FAMILY MEDICINE

## 2021-12-08 PROCEDURE — 2022F DILAT RTA XM EVC RTNOPTHY: CPT | Performed by: FAMILY MEDICINE

## 2021-12-08 PROCEDURE — 1036F TOBACCO NON-USER: CPT | Performed by: FAMILY MEDICINE

## 2021-12-08 PROCEDURE — G8427 DOCREV CUR MEDS BY ELIG CLIN: HCPCS | Performed by: FAMILY MEDICINE

## 2021-12-08 PROCEDURE — 3017F COLORECTAL CA SCREEN DOC REV: CPT | Performed by: FAMILY MEDICINE

## 2021-12-08 PROCEDURE — G8417 CALC BMI ABV UP PARAM F/U: HCPCS | Performed by: FAMILY MEDICINE

## 2021-12-08 PROCEDURE — G8484 FLU IMMUNIZE NO ADMIN: HCPCS | Performed by: FAMILY MEDICINE

## 2021-12-08 PROCEDURE — 3044F HG A1C LEVEL LT 7.0%: CPT | Performed by: FAMILY MEDICINE

## 2021-12-08 ASSESSMENT — ENCOUNTER SYMPTOMS
SHORTNESS OF BREATH: 1
SINUS PRESSURE: 0
COUGH: 0
BACK PAIN: 0
PHOTOPHOBIA: 0
VOMITING: 0
SORE THROAT: 0
BLOOD IN STOOL: 0
WHEEZING: 0
COLOR CHANGE: 0
CHEST TIGHTNESS: 0
APNEA: 0
ABDOMINAL PAIN: 0
NAUSEA: 0
FACIAL SWELLING: 0
DIARRHEA: 0
ALLERGIC/IMMUNOLOGIC NEGATIVE: 1

## 2021-12-08 NOTE — PROGRESS NOTES
Chief Complaint:   Chief Complaint   Patient presents with    Follow-up    Hypertension    Shortness of Breath     still, has been to [facility]. Hypertension  This is a new problem. The current episode started more than 1 month ago. The problem has been waxing and waning since onset. The problem is controlled. Associated symptoms include shortness of breath. Pertinent negatives include no chest pain, headaches or palpitations. Past treatments include beta blockers and diuretics. The current treatment provides significant improvement. There are no compliance problems. Shortness of Breath  This is a new problem. The current episode started more than 1 month ago. Pertinent negatives include no abdominal pain, chest pain, headaches, leg swelling, rash, sore throat, vomiting or wheezing. She has tried nothing for the symptoms.        Patient Active Problem List   Diagnosis    Palpitations    Atypical chest pain    Atrial septal aneurysm    Type 2 diabetes mellitus without complication, without long-term current use of insulin ([facility] [address]    Hyperlipidemia    Essential hypertension       Past Medical History:   Diagnosis Date    Atrial septal aneurysm     Gall bladder disease     Herniated disc     Hyperlipidemia     Hypertension     Infectious lymphangitis of deep lymphatics     Kidney stone        Past Surgical History:   Procedure Laterality Date     SECTION      WISDOM TOOTH EXTRACTION         Current Outpatient Medications   Medication Sig Dispense Refill    D3-50 1.25 MG (59716 UT) CAPS TAKE 1 CAPSULE BY MOUTH EVERY WEEK 12 capsule 0    meloxicam (MOBIC) 15 MG tablet TAKE 1 TABLET BY MOUTH EVERY DAY 30 tablet 1    atorvastatin (LIPITOR) 40 MG tablet TAKE 1 TABLET BY MOUTH EVERY DAY      ARIPiprazole (ABILIFY) 5 MG tablet 5 mg daily       hydroCHLOROthiazide (HYDRODIURIL) 25 MG tablet TAKE 1 TABLET BY MOUTH EVERY DAY      meclizine (ANTIVERT) 25 MG tablet TAKE 1 TABLET BY MOUTH EVERY 8 HOURS AS NEEDED      naproxen (NAPROSYN) 500 MG tablet TAKE 1 TABLET BY MOUTH EVERY 12 HOURS      diclofenac sodium (VOLTAREN) 1 % GEL APPLY TO AFFECTED AREA THREE TIMES PER DAY      metoprolol (LOPRESSOR) 50 MG tablet 2 times daily.  duloxetine (CYMBALTA) 60 MG capsule Take 60 mg by mouth daily. No current facility-administered medications for this visit. No Known Allergies    Social History     Socioeconomic History    Marital status: Legally      Spouse name: None    Number of children: None    Years of education: None    Highest education level: None   Occupational History    None   Tobacco Use    Smoking status: Never Smoker    Smokeless tobacco: Never Used   Vaping Use    Vaping Use: Never used   Substance and Sexual Activity    Alcohol use: No    Drug use: No    Sexual activity: None   Other Topics Concern    None   Social History Narrative    None     Social Determinants of Health     Financial Resource Strain:     Difficulty of Paying Living Expenses: Not on file   Food Insecurity:     Worried About Running Out of Food in the Last Year: Not on file    Gruinder of Food in the Last Year: Not on file   Transportation Needs:     Lack of Transportation (Medical): Not on file    Lack of Transportation (Non-Medical):  Not on file   Physical Activity:     Days of Exercise per Week: Not on file    Minutes of Exercise per Session: Not on file   Stress:     Feeling of Stress : Not on file   Social Connections:     Frequency of Communication with Friends and Family: Not on file    Frequency of Social Gatherings with Friends and Family: Not on file    Attends Oriental orthodox Services: Not on file    Active Member of Clubs or Organizations: Not on file    Attends Club or Organization Meetings: Not on file    Marital Status: Not on file   Intimate Partner Violence:     Fear of Current or Ex-Partner: Not on file    Emotionally Abused: Not on file    Physically Abused: Not on file    Sexually Abused: Not on file   Housing Stability:     Unable to Pay for Housing in the Last Year: Not on file    Number of Places Lived in the Last Year: Not on file    Unstable Housing in the Last Year: Not on file       Family History   Problem Relation Age of Onset    Colon Cancer Father     Cancer Sister     Heart Disease Brother     Arthritis Brother     Hypertension Mother     Arthritis Mother     Heart Disease Mother          Review of Systems   Constitutional: Negative. HENT: Negative for congestion, facial swelling, hearing loss, nosebleeds, sinus pressure and sore throat. Eyes: Negative for photophobia and visual disturbance. Respiratory: Positive for shortness of breath. Negative for apnea, cough, chest tightness and wheezing. Cardiovascular: Negative for chest pain, palpitations and leg swelling. Gastrointestinal: Negative for abdominal pain, blood in stool, diarrhea, nausea and vomiting. Genitourinary: Negative for difficulty urinating, frequency and urgency. Musculoskeletal: Negative for arthralgias, back pain, joint swelling and myalgias. Skin: Negative for color change and rash. Allergic/Immunologic: Negative. Neurological: Negative for syncope, weakness, light-headedness and headaches. Hematological: Negative. Psychiatric/Behavioral: Negative for agitation, behavioral problems, confusion and self-injury. The patient is not nervous/anxious. All other systems reviewed and are negative. Physical Exam  Vitals and nursing note reviewed. Constitutional:       General: She is not in acute distress. Appearance: She is well-developed. HENT:      Head: Normocephalic and atraumatic. Nose: Nose normal.   Eyes:      Conjunctiva/sclera: Conjunctivae normal.      Pupils: Pupils are equal, round, and reactive to light. Neck:      Thyroid: No thyromegaly. Vascular: No JVD.    Cardiovascular:      Rate and Rhythm: Normal rate and regular rhythm. Heart sounds: Normal heart sounds. No murmur heard. No friction rub. No gallop. Pulmonary:      Effort: Pulmonary effort is normal. No respiratory distress. Breath sounds: Normal breath sounds. No wheezing. Abdominal:      General: Bowel sounds are normal. There is no distension. Palpations: Abdomen is soft. Tenderness: There is no abdominal tenderness. There is no guarding or rebound. Musculoskeletal:         General: Normal range of motion. Cervical back: Normal range of motion and neck supple. Lymphadenopathy:      Cervical: No cervical adenopathy. Skin:     General: Skin is warm and dry. Findings: No erythema or rash. Neurological:      Mental Status: She is alert and oriented to person, place, and time. Cranial Nerves: No cranial nerve deficit. Motor: No abnormal muscle tone. Coordination: Coordination normal.      Deep Tendon Reflexes: Reflexes are normal and symmetric. Psychiatric:         Behavior: Behavior normal.         Judgment: Judgment normal.                               ASSESSMENT/PLAN:    Jim Gaytan was seen today for follow-up, hypertension and shortness of breath. Diagnoses and all orders for this visit:    SOB (shortness of breath)  -     XR CHEST (2 VW); Future  -     [facility] ([facility]) - Adrian Dempsey MD, Pulmonary and Critical [facility] [place]    Essential hypertension    Hyperlipidemia, unspecified hyperlipidemia type    Type 2 diabetes mellitus without complication, without long-term current use of insulin ([address]      Lose weight   The current medical regimen is effective;  continue present plan and medications.         Jaz Sears DO    12/8/2021  12:10 PM

## 2021-12-10 RX ORDER — METOPROLOL TARTRATE 50 MG/1
50 TABLET, FILM COATED ORAL 2 TIMES DAILY
Qty: 180 TABLET | Refills: 3 | Status: SHIPPED
Start: 2021-12-10 | End: 2022-11-03

## 2021-12-10 RX ORDER — METHOCARBAMOL 750 MG/1
TABLET ORAL
Qty: 12 CAPSULE | Refills: 0 | Status: SHIPPED
Start: 2021-12-10 | End: 2022-03-14

## 2021-12-10 RX ORDER — MELOXICAM 15 MG/1
TABLET ORAL
Qty: 90 TABLET | Refills: 3 | Status: SHIPPED
Start: 2021-12-10 | End: 2022-11-03

## 2021-12-10 RX ORDER — ATORVASTATIN CALCIUM 40 MG/1
TABLET, FILM COATED ORAL
Qty: 90 TABLET | Refills: 3 | Status: SHIPPED
Start: 2021-12-10 | End: 2022-11-03

## 2021-12-10 RX ORDER — ARIPIPRAZOLE 5 MG/1
5 TABLET ORAL DAILY
Qty: 90 TABLET | Refills: 3 | Status: SHIPPED | OUTPATIENT
Start: 2021-12-10

## 2021-12-10 RX ORDER — HYDROCHLOROTHIAZIDE 25 MG/1
TABLET ORAL
Qty: 90 TABLET | Refills: 1 | Status: SHIPPED
Start: 2021-12-10 | End: 2022-02-25

## 2021-12-10 RX ORDER — DULOXETIN HYDROCHLORIDE 60 MG/1
60 CAPSULE, DELAYED RELEASE ORAL DAILY
Qty: 90 CAPSULE | Refills: 3 | Status: SHIPPED
Start: 2021-12-10 | End: 2022-11-03

## 2021-12-10 NOTE — TELEPHONE ENCOUNTER
Patient calling, states that when she was here for her appointment scripts were supposed to be sent to pharmacy. She states that we were to contact the pharmacy for the list of meds she needed refilled. Patient would like call back once complete.

## 2021-12-14 ENCOUNTER — OFFICE VISIT (OUTPATIENT)
Dept: PRIMARY CARE CLINIC | Age: 56
End: 2021-12-14
Payer: COMMERCIAL

## 2021-12-14 ENCOUNTER — NURSE TRIAGE (OUTPATIENT)
Dept: OTHER | Facility: CLINIC | Age: 56
End: 2021-12-14

## 2021-12-14 VITALS
RESPIRATION RATE: 18 BRPM | DIASTOLIC BLOOD PRESSURE: 86 MMHG | BODY MASS INDEX: 41.32 KG/M2 | HEIGHT: 64 IN | OXYGEN SATURATION: 98 % | TEMPERATURE: 97.3 F | WEIGHT: 242 LBS | SYSTOLIC BLOOD PRESSURE: 126 MMHG | HEART RATE: 79 BPM

## 2021-12-14 DIAGNOSIS — R22.0 SWELLING OF RIGHT SIDE OF FACE: Primary | ICD-10-CM

## 2021-12-14 PROCEDURE — G8417 CALC BMI ABV UP PARAM F/U: HCPCS | Performed by: FAMILY MEDICINE

## 2021-12-14 PROCEDURE — 3017F COLORECTAL CA SCREEN DOC REV: CPT | Performed by: FAMILY MEDICINE

## 2021-12-14 PROCEDURE — 99213 OFFICE O/P EST LOW 20 MIN: CPT | Performed by: FAMILY MEDICINE

## 2021-12-14 PROCEDURE — G8484 FLU IMMUNIZE NO ADMIN: HCPCS | Performed by: FAMILY MEDICINE

## 2021-12-14 PROCEDURE — G8427 DOCREV CUR MEDS BY ELIG CLIN: HCPCS | Performed by: FAMILY MEDICINE

## 2021-12-14 PROCEDURE — 1036F TOBACCO NON-USER: CPT | Performed by: FAMILY MEDICINE

## 2021-12-14 ASSESSMENT — ENCOUNTER SYMPTOMS
SINUS PRESSURE: 0
PHOTOPHOBIA: 0
FACIAL SWELLING: 1
BACK PAIN: 0
ALLERGIC/IMMUNOLOGIC NEGATIVE: 1
WHEEZING: 0
DIARRHEA: 0
SORE THROAT: 0
COLOR CHANGE: 0
ABDOMINAL PAIN: 0
CHEST TIGHTNESS: 0
VOMITING: 0
SHORTNESS OF BREATH: 0
NAUSEA: 0
BLOOD IN STOOL: 0
COUGH: 0
APNEA: 0

## 2021-12-14 NOTE — TELEPHONE ENCOUNTER
Received call from [facility] at [facility] with Red Flag Complaint. Brief description of triage: right side of face swelling, warm to touch, Patient disconnected prior to [facility] ([facility]) transfer, called patient back at verified number, no identifiable voicemail. Left a generic message. Care advice provided, patient verbalizes understanding; denies any other questions or concerns; instructed to call back for any new or worsening symptoms. Attention Provider: Thank you for allowing me to participate in the care of your patient. The patient was connected to triage in response to information provided to the ECC/PSC. Please do not respond through this encounter as the response is not directed to a shared pool.                 Reason for Disposition   Caller has cancelled the call before the first contact    Protocols used: NO CONTACT OR DUPLICATE CONTACT CALL-ADULT-

## 2022-01-04 ENCOUNTER — TELEPHONE (OUTPATIENT)
Dept: CARDIOLOGY CLINIC | Age: 57
End: 2022-01-04

## 2022-01-04 NOTE — TELEPHONE ENCOUNTER
Patient called and stated she is going to need cardiac clearance for a cystoscopy and colonoscopy.   Please advise

## 2022-01-05 ENCOUNTER — OFFICE VISIT (OUTPATIENT)
Dept: PRIMARY CARE CLINIC | Age: 57
End: 2022-01-05
Payer: COMMERCIAL

## 2022-01-05 VITALS
WEIGHT: 247 LBS | OXYGEN SATURATION: 92 % | RESPIRATION RATE: 18 BRPM | SYSTOLIC BLOOD PRESSURE: 126 MMHG | HEART RATE: 85 BPM | BODY MASS INDEX: 42.17 KG/M2 | DIASTOLIC BLOOD PRESSURE: 86 MMHG | HEIGHT: 64 IN | TEMPERATURE: 97.2 F

## 2022-01-05 DIAGNOSIS — Z01.818 PRE-OP EXAM: Primary | ICD-10-CM

## 2022-01-05 DIAGNOSIS — R31.1 BENIGN ESSENTIAL MICROSCOPIC HEMATURIA: ICD-10-CM

## 2022-01-05 PROCEDURE — G8427 DOCREV CUR MEDS BY ELIG CLIN: HCPCS | Performed by: FAMILY MEDICINE

## 2022-01-05 PROCEDURE — G8417 CALC BMI ABV UP PARAM F/U: HCPCS | Performed by: FAMILY MEDICINE

## 2022-01-05 PROCEDURE — 3017F COLORECTAL CA SCREEN DOC REV: CPT | Performed by: FAMILY MEDICINE

## 2022-01-05 PROCEDURE — G8484 FLU IMMUNIZE NO ADMIN: HCPCS | Performed by: FAMILY MEDICINE

## 2022-01-05 PROCEDURE — 99214 OFFICE O/P EST MOD 30 MIN: CPT | Performed by: FAMILY MEDICINE

## 2022-01-05 PROCEDURE — 93000 ELECTROCARDIOGRAM COMPLETE: CPT | Performed by: FAMILY MEDICINE

## 2022-01-05 PROCEDURE — 1036F TOBACCO NON-USER: CPT | Performed by: FAMILY MEDICINE

## 2022-01-05 ASSESSMENT — ENCOUNTER SYMPTOMS
COLOR CHANGE: 0
SINUS PRESSURE: 0
DIARRHEA: 0
NAUSEA: 0
SHORTNESS OF BREATH: 0
SORE THROAT: 0
ALLERGIC/IMMUNOLOGIC NEGATIVE: 1
VOMITING: 0
WHEEZING: 0
PHOTOPHOBIA: 0
APNEA: 0
BLOOD IN STOOL: 0
CHEST TIGHTNESS: 0
ABDOMINAL PAIN: 0
BACK PAIN: 0
FACIAL SWELLING: 0
COUGH: 0

## 2022-01-05 NOTE — PROGRESS NOTES
Chief Complaint:   Chief Complaint   Patient presents with    Pre-op Exam       Urinary Frequency   This is a recurrent problem. The current episode started more than 1 month ago. The problem has been gradually worsening. Associated symptoms include frequency. Pertinent negatives include no nausea, urgency or vomiting.   scheduled for cystoscopy    Patient Active Problem List   Diagnosis    Palpitations    Atypical chest pain    Atrial septal aneurysm    Type 2 diabetes mellitus without complication, without long-term current use of insulin (HCC)    Hyperlipidemia    Essential hypertension       Past Medical History:   Diagnosis Date    Atrial septal aneurysm     Gall bladder disease     Herniated disc     Hyperlipidemia     Hypertension     Infectious lymphangitis of deep lymphatics     Kidney stone        Past Surgical History:   Procedure Laterality Date     SECTION      WISDOM TOOTH EXTRACTION         Current Outpatient Medications   Medication Sig Dispense Refill    meloxicam (MOBIC) 15 MG tablet TAKE 1 TABLET BY MOUTH EVERY DAY 90 tablet 3    D3-50 1.25 MG (07621 UT) CAPS TAKE 1 CAPSULE BY MOUTH EVERY WEEK 12 capsule 0    DULoxetine (CYMBALTA) 60 MG extended release capsule Take 1 capsule by mouth daily 90 capsule 3    metoprolol tartrate (LOPRESSOR) 50 MG tablet Take 1 tablet by mouth 2 times daily 180 tablet 3    ARIPiprazole (ABILIFY) 5 MG tablet Take 1 tablet by mouth daily 90 tablet 3    atorvastatin (LIPITOR) 40 MG tablet TAKE 1 TABLET BY MOUTH EVERY DAY 90 tablet 3    hydroCHLOROthiazide (HYDRODIURIL) 25 MG tablet TAKE 1 TABLET BY MOUTH EVERY DAY 90 tablet 1    meclizine (ANTIVERT) 25 MG tablet TAKE 1 TABLET BY MOUTH EVERY 8 HOURS AS NEEDED      naproxen (NAPROSYN) 500 MG tablet TAKE 1 TABLET BY MOUTH EVERY 12 HOURS      diclofenac sodium (VOLTAREN) 1 % GEL APPLY TO AFFECTED AREA THREE TIMES PER DAY       No current facility-administered medications for this visit. No Known Allergies    Social History     Socioeconomic History    Marital status: Legally      Spouse name: None    Number of children: None    Years of education: None    Highest education level: None   Occupational History    None   Tobacco Use    Smoking status: Never Smoker    Smokeless tobacco: Never Used   Vaping Use    Vaping Use: Never used   Substance and Sexual Activity    Alcohol use: No    Drug use: No    Sexual activity: None   Other Topics Concern    None   Social History Narrative    None     Social Determinants of Health     Financial Resource Strain:     Difficulty of Paying Living Expenses: Not on file   Food Insecurity:     Worried About Running Out of Food in the Last Year: Not on file    Gurinder of Food in the Last Year: Not on file   Transportation Needs:     Lack of Transportation (Medical): Not on file    Lack of Transportation (Non-Medical):  Not on file   Physical Activity:     Days of Exercise per Week: Not on file    Minutes of Exercise per Session: Not on file   Stress:     Feeling of Stress : Not on file   Social Connections:     Frequency of Communication with Friends and Family: Not on file    Frequency of Social Gatherings with Friends and Family: Not on file    Attends Mormonism Services: Not on file    Active Member of [address] [place] [address] [employer] or Organizations: Not on file    Attends Club or Organization Meetings: Not on file    Marital Status: Not on file   Intimate Partner Violence:     Fear of Current or Ex-Partner: Not on file    Emotionally Abused: Not on file    Physically Abused: Not on file    Sexually Abused: Not on file   Housing Stability:     Unable to Pay for Housing in the Last Year: Not on file    Number of [place] in the Last Year: Not on file    Unstable Housing in the Last Year: Not on file       Family History   Problem Relation Age of Onset    Colon Cancer Father     Cancer Sister     Heart Disease Brother     Arthritis Brother     Hypertension Mother     Arthritis Mother     Heart Disease Mother          Review of Systems   Constitutional: Negative. HENT: Negative for congestion, facial swelling, hearing loss, nosebleeds, sinus pressure and sore throat. Eyes: Negative for photophobia and visual disturbance. Respiratory: Negative for apnea, cough, chest tightness, shortness of breath and wheezing. Cardiovascular: Negative for chest pain, palpitations and leg swelling. Gastrointestinal: Negative for abdominal pain, blood in stool, diarrhea, nausea and vomiting. Genitourinary: Positive for frequency. Negative for difficulty urinating and urgency. Musculoskeletal: Negative for arthralgias, back pain, joint swelling and myalgias. Skin: Negative for color change and rash. Allergic/Immunologic: Negative. Neurological: Negative for syncope, weakness, light-headedness and headaches. Hematological: Negative. Psychiatric/Behavioral: Negative for agitation, behavioral problems, confusion and self-injury. The patient is not nervous/anxious. All other systems reviewed and are negative. Physical Exam  Vitals and nursing note reviewed. Constitutional:       General: She is not in acute distress. Appearance: She is well-developed. HENT:      Head: Normocephalic and atraumatic. Nose: Nose normal.   Eyes:      Conjunctiva/sclera: Conjunctivae normal.      Pupils: Pupils are equal, round, and reactive to light. Neck:      Thyroid: No thyromegaly. Vascular: No JVD. Cardiovascular:      Rate and Rhythm: Normal rate and regular rhythm. Heart sounds: Normal heart sounds. No murmur heard. No friction rub. No gallop. Pulmonary:      Effort: Pulmonary effort is normal. No respiratory distress. Breath sounds: Normal breath sounds. No wheezing. Abdominal:      General: Bowel sounds are normal. There is no distension. Palpations: Abdomen is soft. Tenderness:  There is no abdominal tenderness. There is no guarding or rebound. Musculoskeletal:         General: Normal range of motion. Cervical back: Normal range of motion and neck supple. Lymphadenopathy:      Cervical: No cervical adenopathy. Skin:     General: Skin is warm and dry. Findings: No erythema or rash. Neurological:      Mental Status: She is alert and oriented to person, place, and time. Cranial Nerves: No cranial nerve deficit. Motor: No abnormal muscle tone. Coordination: Coordination normal.      Deep Tendon Reflexes: Reflexes are normal and symmetric.    Psychiatric:         Behavior: Behavior normal.         Judgment: Judgment normal.                               ASSESSMENT/PLAN:    Yamel Bowers was seen today for pre-op exam.    Diagnoses and all orders for this visit:    Pre-op exam  -     EKG 12 Lead    Benign essential microscopic hematuria      Ok for cystoscopy      Keely Oneil DO    1/5/2022  1:52 PM

## 2022-01-06 DIAGNOSIS — R22.0 SWELLING OF RIGHT SIDE OF FACE: ICD-10-CM

## 2022-01-07 ENCOUNTER — HOSPITAL ENCOUNTER (OUTPATIENT)
Age: 57
Discharge: HOME | End: 2022-01-09

## 2022-01-07 PROCEDURE — 88112 CYTOPATH CELL ENHANCE TECH: CPT

## 2022-01-07 PROCEDURE — 87088 URINE BACTERIA CULTURE: CPT

## 2022-01-09 LAB — URINE CULTURE, ROUTINE: NORMAL

## 2022-01-10 DIAGNOSIS — D17.0 BEN LIPOMATOUS NEOPLM OF SKIN, SUBCU OF HEAD, FACE AND NECK: ICD-10-CM

## 2022-01-10 DIAGNOSIS — R22.0 SWELLING OF RIGHT SIDE OF FACE: Primary | ICD-10-CM

## 2022-02-15 ENCOUNTER — TELEPHONE (OUTPATIENT)
Dept: PRIMARY CARE CLINIC | Age: 57
End: 2022-02-15
Payer: COMMERCIAL

## 2022-02-15 DIAGNOSIS — R30.0 DYSURIA: Primary | ICD-10-CM

## 2022-02-15 PROCEDURE — 81002 URINALYSIS NONAUTO W/O SCOPE: CPT | Performed by: FAMILY MEDICINE

## 2022-02-15 NOTE — TELEPHONE ENCOUNTER
Frequent urination, dark colored urine, has been getting UTI's frequently. Asking if you could order a urinalysis and culture at the lab so patient can come in and have it done.

## 2022-02-25 ENCOUNTER — OFFICE VISIT (OUTPATIENT)
Dept: PRIMARY CARE CLINIC | Age: 57
End: 2022-02-25
Payer: COMMERCIAL

## 2022-02-25 ENCOUNTER — TELEPHONE (OUTPATIENT)
Dept: ADMINISTRATIVE | Age: 57
End: 2022-02-25

## 2022-02-25 VITALS
TEMPERATURE: 97.2 F | WEIGHT: 247 LBS | OXYGEN SATURATION: 97 % | SYSTOLIC BLOOD PRESSURE: 134 MMHG | HEIGHT: 64 IN | BODY MASS INDEX: 42.17 KG/M2 | HEART RATE: 110 BPM | DIASTOLIC BLOOD PRESSURE: 86 MMHG | RESPIRATION RATE: 18 BRPM

## 2022-02-25 DIAGNOSIS — R06.02 SOB (SHORTNESS OF BREATH): ICD-10-CM

## 2022-02-25 DIAGNOSIS — M79.89 LEG SWELLING: ICD-10-CM

## 2022-02-25 DIAGNOSIS — R30.0 DYSURIA: ICD-10-CM

## 2022-02-25 DIAGNOSIS — M79.89 LEG SWELLING: Primary | ICD-10-CM

## 2022-02-25 DIAGNOSIS — R06.02 SOB (SHORTNESS OF BREATH): Primary | ICD-10-CM

## 2022-02-25 LAB
ALBUMIN SERPL-MCNC: 4.3 G/DL (ref 3.5–5.2)
ALP BLD-CCNC: 115 U/L (ref 35–104)
ALT SERPL-CCNC: 14 U/L (ref 0–32)
ANION GAP SERPL CALCULATED.3IONS-SCNC: 14 MMOL/L (ref 7–16)
AST SERPL-CCNC: 14 U/L (ref 0–31)
BASOPHILS ABSOLUTE: 0.04 E9/L (ref 0–0.2)
BASOPHILS RELATIVE PERCENT: 0.5 % (ref 0–2)
BILIRUB SERPL-MCNC: 0.3 MG/DL (ref 0–1.2)
BUN BLDV-MCNC: 13 MG/DL (ref 6–20)
CALCIUM SERPL-MCNC: 9.7 MG/DL (ref 8.6–10.2)
CHLORIDE BLD-SCNC: 103 MMOL/L (ref 98–107)
CHOLESTEROL, TOTAL: 209 MG/DL (ref 0–199)
CO2: 24 MMOL/L (ref 22–29)
CREAT SERPL-MCNC: 0.7 MG/DL (ref 0.5–1)
EOSINOPHILS ABSOLUTE: 0.14 E9/L (ref 0.05–0.5)
EOSINOPHILS RELATIVE PERCENT: 1.8 % (ref 0–6)
GFR AFRICAN AMERICAN: >60
GFR NON-AFRICAN AMERICAN: >60 ML/MIN/1.73
GLUCOSE BLD-MCNC: 143 MG/DL (ref 74–99)
HCT VFR BLD CALC: 41.2 % (ref 34–48)
HDLC SERPL-MCNC: 50 MG/DL
HEMOGLOBIN: 12.8 G/DL (ref 11.5–15.5)
IMMATURE GRANULOCYTES #: 0.02 E9/L
IMMATURE GRANULOCYTES %: 0.3 % (ref 0–5)
LDL CHOLESTEROL CALCULATED: 129 MG/DL (ref 0–99)
LYMPHOCYTES ABSOLUTE: 1.59 E9/L (ref 1.5–4)
LYMPHOCYTES RELATIVE PERCENT: 20.5 % (ref 20–42)
MCH RBC QN AUTO: 27.7 PG (ref 26–35)
MCHC RBC AUTO-ENTMCNC: 31.1 % (ref 32–34.5)
MCV RBC AUTO: 89.2 FL (ref 80–99.9)
MONOCYTES ABSOLUTE: 0.37 E9/L (ref 0.1–0.95)
MONOCYTES RELATIVE PERCENT: 4.8 % (ref 2–12)
NEUTROPHILS ABSOLUTE: 5.58 E9/L (ref 1.8–7.3)
NEUTROPHILS RELATIVE PERCENT: 72.1 % (ref 43–80)
PDW BLD-RTO: 14.5 FL (ref 11.5–15)
PLATELET # BLD: 393 E9/L (ref 130–450)
PMV BLD AUTO: 10.3 FL (ref 7–12)
POTASSIUM SERPL-SCNC: 3.8 MMOL/L (ref 3.5–5)
PRO-BNP: 50 PG/ML (ref 0–125)
RBC # BLD: 4.62 E12/L (ref 3.5–5.5)
SODIUM BLD-SCNC: 141 MMOL/L (ref 132–146)
TOTAL PROTEIN: 7.5 G/DL (ref 6.4–8.3)
TRIGL SERPL-MCNC: 152 MG/DL (ref 0–149)
TSH SERPL DL<=0.05 MIU/L-ACNC: 1.93 UIU/ML (ref 0.27–4.2)
VLDLC SERPL CALC-MCNC: 30 MG/DL
WBC # BLD: 7.7 E9/L (ref 4.5–11.5)

## 2022-02-25 PROCEDURE — 3017F COLORECTAL CA SCREEN DOC REV: CPT | Performed by: FAMILY MEDICINE

## 2022-02-25 PROCEDURE — G8417 CALC BMI ABV UP PARAM F/U: HCPCS | Performed by: FAMILY MEDICINE

## 2022-02-25 PROCEDURE — G8484 FLU IMMUNIZE NO ADMIN: HCPCS | Performed by: FAMILY MEDICINE

## 2022-02-25 PROCEDURE — 1036F TOBACCO NON-USER: CPT | Performed by: FAMILY MEDICINE

## 2022-02-25 PROCEDURE — 99214 OFFICE O/P EST MOD 30 MIN: CPT | Performed by: FAMILY MEDICINE

## 2022-02-25 PROCEDURE — G8427 DOCREV CUR MEDS BY ELIG CLIN: HCPCS | Performed by: FAMILY MEDICINE

## 2022-02-25 PROCEDURE — 93000 ELECTROCARDIOGRAM COMPLETE: CPT | Performed by: FAMILY MEDICINE

## 2022-02-25 RX ORDER — FUROSEMIDE 20 MG/1
20 TABLET ORAL DAILY
Qty: 30 TABLET | Refills: 2 | Status: SHIPPED
Start: 2022-02-25 | End: 2022-03-21

## 2022-02-25 ASSESSMENT — ENCOUNTER SYMPTOMS
BACK PAIN: 0
PHOTOPHOBIA: 0
ALLERGIC/IMMUNOLOGIC NEGATIVE: 1
WHEEZING: 0
COUGH: 0
ABDOMINAL PAIN: 0
CHEST TIGHTNESS: 0
DIARRHEA: 0
SINUS PRESSURE: 0
APNEA: 0
VOMITING: 0
SORE THROAT: 0
COLOR CHANGE: 0
FACIAL SWELLING: 0
NAUSEA: 0
BLOOD IN STOOL: 0
SHORTNESS OF BREATH: 0

## 2022-02-25 NOTE — TELEPHONE ENCOUNTER
Pt requesting to see \"Dr. Cuellar\". Ok to schedule an OV with Dr. Reji Coleman? Scheduling advise/timing please?

## 2022-02-25 NOTE — PROGRESS NOTES
Chief Complaint:   Chief Complaint   Patient presents with    Leg Swelling     for last 2-3 days     Leg Pain    Shortness of Breath       Shortness of Breath  This is a chronic problem. The current episode started more than 1 month ago. The problem occurs daily. The problem has been waxing and waning. Associated symptoms include leg swelling. Pertinent negatives include no abdominal pain, chest pain, headaches, rash, sore throat, vomiting or wheezing. She has tried nothing for the symptoms.        Patient Active Problem List   Diagnosis    Palpitations    Atypical chest pain    Atrial septal aneurysm    Type 2 diabetes mellitus without complication, without long-term current use of insulin ([facility] [address]    Hyperlipidemia    Essential hypertension       Past Medical History:   Diagnosis Date    Atrial septal aneurysm     Gall bladder disease     Herniated disc     Hyperlipidemia     Hypertension     Infectious lymphangitis of deep lymphatics     Kidney stone        Past Surgical History:   Procedure Laterality Date     SECTION      WISDOM TOOTH EXTRACTION         Current Outpatient Medications   Medication Sig Dispense Refill    furosemide (LASIX) 20 MG tablet Take 1 tablet by mouth daily 30 tablet 2    meloxicam (MOBIC) 15 MG tablet TAKE 1 TABLET BY MOUTH EVERY DAY 90 tablet 3    D3-50 1.25 MG (07780 UT) CAPS TAKE 1 CAPSULE BY MOUTH EVERY WEEK 12 capsule 0    DULoxetine (CYMBALTA) 60 MG extended release capsule Take 1 capsule by mouth daily 90 capsule 3    metoprolol tartrate (LOPRESSOR) 50 MG tablet Take 1 tablet by mouth 2 times daily 180 tablet 3    ARIPiprazole (ABILIFY) 5 MG tablet Take 1 tablet by mouth daily 90 tablet 3    atorvastatin (LIPITOR) 40 MG tablet TAKE 1 TABLET BY MOUTH EVERY DAY 90 tablet 3    meclizine (ANTIVERT) 25 MG tablet TAKE 1 TABLET BY MOUTH EVERY 8 HOURS AS NEEDED      naproxen (NAPROSYN) 500 MG tablet TAKE 1 TABLET BY MOUTH EVERY 12 HOURS      diclofenac sodium (VOLTAREN) 1 % GEL APPLY TO AFFECTED AREA THREE TIMES PER DAY       No current facility-administered medications for this visit. No Known Allergies    Social History     Socioeconomic History    Marital status: Legally      Spouse name: None    Number of children: None    Years of education: None    Highest education level: None   Occupational History    None   Tobacco Use    Smoking status: Never Smoker    Smokeless tobacco: Never Used   Vaping Use    Vaping Use: Never used   Substance and Sexual Activity    Alcohol use: No    Drug use: No    Sexual activity: None   Other Topics Concern    None   Social History Narrative    None     Social Determinants of Health     Financial Resource Strain:     Difficulty of Paying Living Expenses: Not on file   Food Insecurity:     Worried About Running Out of Food in the Last Year: Not on file    Gurinder of Food in the Last Year: Not on file   Transportation Needs:     Lack of Transportation (Medical): Not on file    Lack of Transportation (Non-Medical):  Not on file   Physical Activity:     Days of Exercise per Week: Not on file    Minutes of Exercise per Session: Not on file   Stress:     Feeling of Stress : Not on file   Social Connections:     Frequency of Communication with Friends and Family: Not on file    Frequency of Social Gatherings with Friends and Family: Not on file    Attends Congregational Services: Not on file    Active Member of [address] [place] [address] or Organizations: Not on file    Attends Club or Organization Meetings: Not on file    Marital Status: Not on file   Intimate Partner Violence:     Fear of Current or Ex-Partner: Not on file    Emotionally Abused: Not on file    Physically Abused: Not on file    Sexually Abused: Not on file   Housing Stability:     Unable to Pay for Housing in the Last Year: Not on file    Number of [place] in the Last Year: Not on file    Unstable Housing in the Last Year: Not on file Family History   Problem Relation Age of Onset    Colon Cancer Father     Cancer Sister     Heart Disease Brother     Arthritis Brother     Hypertension Mother     Arthritis Mother     Heart Disease Mother          Review of Systems   Constitutional: Negative. HENT: Negative for congestion, facial swelling, hearing loss, nosebleeds, sinus pressure and sore throat. Eyes: Negative for photophobia and visual disturbance. Respiratory: Negative for apnea, cough, chest tightness, shortness of breath and wheezing. Cardiovascular: Positive for leg swelling. Negative for chest pain and palpitations. Gastrointestinal: Negative for abdominal pain, blood in stool, diarrhea, nausea and vomiting. Genitourinary: Negative for difficulty urinating, frequency and urgency. Musculoskeletal: Negative for arthralgias, back pain, joint swelling and myalgias. Skin: Negative for color change and rash. Allergic/Immunologic: Negative. Neurological: Negative for syncope, weakness, light-headedness and headaches. Hematological: Negative. Psychiatric/Behavioral: Negative for agitation, behavioral problems, confusion and self-injury. The patient is not nervous/anxious. All other systems reviewed and are negative. Physical Exam  Vitals and nursing note reviewed. Constitutional:       General: She is not in acute distress. Appearance: She is well-developed. HENT:      Head: Normocephalic and atraumatic. Nose: Nose normal.   Eyes:      Conjunctiva/sclera: Conjunctivae normal.      Pupils: Pupils are equal, round, and reactive to light. Neck:      Thyroid: No thyromegaly. Vascular: No JVD. Cardiovascular:      Rate and Rhythm: Normal rate and regular rhythm. Heart sounds: Normal heart sounds. No murmur heard. No friction rub. No gallop. Pulmonary:      Effort: Pulmonary effort is normal. No respiratory distress. Breath sounds: Normal breath sounds. No wheezing. Abdominal:      General: Bowel sounds are normal. There is no distension. Palpations: Abdomen is soft. Tenderness: There is no abdominal tenderness. There is no guarding or rebound. Musculoskeletal:         General: Normal range of motion. Cervical back: Normal range of motion and neck supple. Right lower le+ Pitting Edema present. Left lower le+ Pitting Edema present. Lymphadenopathy:      Cervical: No cervical adenopathy. Skin:     General: Skin is warm and dry. Findings: No erythema or rash. Neurological:      Mental Status: She is alert and oriented to person, place, and time. Cranial Nerves: No cranial nerve deficit. Motor: No abnormal muscle tone. Coordination: Coordination normal.      Deep Tendon Reflexes: Reflexes are normal and symmetric. Psychiatric:         Behavior: Behavior normal.         Judgment: Judgment normal.                               ASSESSMENT/PLAN:    Concepción Orantes was seen today for leg swelling, leg pain and shortness of breath. Diagnoses and all orders for this visit:    SOB (shortness of breath)  -     EKG 12 Lead  -     CBC with Auto Differential; Future  -     Comprehensive Metabolic Panel; Future  -     TSH; Future  -     Lipid Panel; Future  -     Brain Natriuretic Peptide; Future  -     furosemide (LASIX) 20 MG tablet; Take 1 tablet by mouth daily  -     Jack Oliva MD, Cardiology, [place]    Leg swelling  -     CBC with Auto Differential; Future  -     Comprehensive Metabolic Panel; Future  -     TSH; Future  -     Lipid Panel; Future  -     Brain Natriuretic Peptide; Future  -     furosemide (LASIX) 20 MG tablet;  Take 1 tablet by mouth daily  -     Jack Oliva MD, Cardiology, [address] [place] [address]    2022  10:44 AM

## 2022-02-25 NOTE — TELEPHONE ENCOUNTER
There is a referral in the [place] per Dr. Dino Way dx: SOB and leg swelling. Last saw [facility] in [address] on: 11/5/21. Scheduling advise please.

## 2022-02-25 NOTE — TELEPHONE ENCOUNTER
Patient notified of Dr. Martha Rolle recommendation. F/U scheduled for 3/10/22 at 7:40 a.m. Order placed for echo.

## 2022-02-25 NOTE — TELEPHONE ENCOUNTER
Dr. Ole Lewis, please advise if you are willing to see patient, if testing is needed or just F/U and when patient should F/U.

## 2022-02-27 LAB — URINE CULTURE, ROUTINE: NORMAL

## 2022-03-01 ENCOUNTER — TELEPHONE (OUTPATIENT)
Dept: CARDIOLOGY | Age: 57
End: 2022-03-01

## 2022-03-01 NOTE — TELEPHONE ENCOUNTER
PT CALLED TO SCHEDULE ECHO FOR 03-02-22. REVIEWED COVID CHECKLIST WITH PATIENT.     Electronically signed by Panda Moscoso on 3/1/2022 at 12:26 PM

## 2022-03-02 ENCOUNTER — HOSPITAL ENCOUNTER (OUTPATIENT)
Dept: CARDIOLOGY | Age: 57
Discharge: HOME | End: 2022-03-02
Payer: COMMERCIAL

## 2022-03-02 DIAGNOSIS — R06.02 SOB (SHORTNESS OF BREATH): ICD-10-CM

## 2022-03-02 DIAGNOSIS — M79.89 LEG SWELLING: ICD-10-CM

## 2022-03-02 LAB
LV EF: 58 %
LVEF MODALITY: NORMAL

## 2022-03-02 PROCEDURE — 93306 TTE W/DOPPLER COMPLETE: CPT | Performed by: PSYCHIATRY & NEUROLOGY

## 2022-03-04 ENCOUNTER — TELEPHONE (OUTPATIENT)
Dept: CARDIOLOGY CLINIC | Age: 57
End: 2022-03-04

## 2022-03-04 NOTE — TELEPHONE ENCOUNTER
----- Message from Joshua Lopez MD sent at 3/4/2022  3:36 PM EST -----  Her echocardiogram is normal.

## 2022-03-10 ENCOUNTER — OFFICE VISIT (OUTPATIENT)
Dept: CARDIOLOGY CLINIC | Age: 57
End: 2022-03-10
Payer: COMMERCIAL

## 2022-03-10 ENCOUNTER — TELEPHONE (OUTPATIENT)
Dept: CARDIOLOGY CLINIC | Age: 57
End: 2022-03-10

## 2022-03-10 VITALS
RESPIRATION RATE: 18 BRPM | HEIGHT: 64 IN | WEIGHT: 250.7 LBS | DIASTOLIC BLOOD PRESSURE: 80 MMHG | SYSTOLIC BLOOD PRESSURE: 128 MMHG | HEART RATE: 86 BPM | BODY MASS INDEX: 42.8 KG/M2

## 2022-03-10 DIAGNOSIS — R06.02 SOB (SHORTNESS OF BREATH): Primary | ICD-10-CM

## 2022-03-10 DIAGNOSIS — M79.89 LEG SWELLING: ICD-10-CM

## 2022-03-10 PROCEDURE — G8427 DOCREV CUR MEDS BY ELIG CLIN: HCPCS | Performed by: INTERNAL MEDICINE

## 2022-03-10 PROCEDURE — G8417 CALC BMI ABV UP PARAM F/U: HCPCS | Performed by: INTERNAL MEDICINE

## 2022-03-10 PROCEDURE — 1036F TOBACCO NON-USER: CPT | Performed by: INTERNAL MEDICINE

## 2022-03-10 PROCEDURE — 3017F COLORECTAL CA SCREEN DOC REV: CPT | Performed by: INTERNAL MEDICINE

## 2022-03-10 PROCEDURE — 93000 ELECTROCARDIOGRAM COMPLETE: CPT | Performed by: INTERNAL MEDICINE

## 2022-03-10 PROCEDURE — 99214 OFFICE O/P EST MOD 30 MIN: CPT | Performed by: INTERNAL MEDICINE

## 2022-03-10 PROCEDURE — G8484 FLU IMMUNIZE NO ADMIN: HCPCS | Performed by: INTERNAL MEDICINE

## 2022-03-10 NOTE — TELEPHONE ENCOUNTER
Per Dr. Ann Zepeda, patient needs Coronary CTA at [facility] re: SOB. Prior auth pending.     Coronary CTA ([id number])  SOB (R06.02)

## 2022-03-10 NOTE — PATIENT INSTRUCTIONS
1. Schedule for a coronary CTA to rule out CAD due to exertional dyspnea, she is unable to exercise  2. Echo results were reviewed and discussed with the patient. It showed normal LV systolic diastolic function. Normal RV size and function in normal valve functions. 3. Advised to practice intermittent fasting for weight loss management. 4. Advised to get a sleep study to rule out sleep apnea  5. Daily swimming to lose weight   6. Continue current mediations   7.  Follow up with me 3 months

## 2022-03-10 NOTE — PROGRESS NOTES
OUTPATIENT CARDIOLOGY FOLLOW-UP    Name: Doug Gil    Age: 64 y.o. Primary Care Physician: Keely Oneil DO    Date of Service: 3/10/2022    Chief Complaint:   Chief Complaint   Patient presents with    Shortness of Breath     ov- pt has complaints of palpitations, sob, dizziness, swelling in feet and legs, and fatigue    Edema       Interim History:   Ms. Lunda Fothergill is a 19-year-old  female with history of morbid obesity, atrial septal aneurysm, palpitations and atypical chest pain presented to the office visit for evaluation of dyspnea with exertion and leg edema. She was recently evaluated in the office by Dr. Dyana Reddy on 11/5/2021 and noted that her Holter stress test and echo were this years have been normal and no further cardiac work-up was recommended. Patient was reassured and was recommended follow-up as needed. She has been having dyspnea for the past 15 years along with some chest pain. She told me she recently lost her  from colon cancer and passed away in January 2022. Since her last visit, she has not had any ER visits or hospitalizations. He is compliant with medications, as well as salt and fluid intake. He does not take any over-the-counter arthritis medications. No new cardiac complaints since last cardiology evaluation except for chronic dyspnea with exertion along with fatigue, palpitations and some chest discomfort. .  She denies lightheadedness, dizziness, syncope, PND, or orthopnea. SR on EKG.     Review of Systems:   Cardiac: As per HPI  General: No fever, chills  Pulmonary: As per HPI  HEENT: No visual disturbances, difficult swallowing  GI: No nausea, vomiting  Endocrine: No thyroid disease or DM  Musculoskeletal: ALEGRIA x 4, no focal motor deficits  Skin: Intact, no rashes  Neuro/Psych: No headache or seizures    Past Medical History:  Past Medical History:   Diagnosis Date    Atrial septal aneurysm     Gall bladder disease     Herniated disc     Hyperlipidemia     Hypertension     Infectious lymphangitis of deep lymphatics     Kidney stone        Past Surgical History:  Past Surgical History:   Procedure Laterality Date     SECTION      WISDOM TOOTH EXTRACTION         Family History:  Family History   Problem Relation Age of Onset    Colon Cancer Father     Cancer Sister     Heart Disease Brother     Arthritis Brother     Hypertension Mother     Arthritis Mother     Heart Disease Mother        Social History:  Social History     Socioeconomic History    Marital status: Legally      Spouse name: Not on file    Number of children: Not on file    Years of education: Not on file    Highest education level: Not on file   Occupational History    Not on file   Tobacco Use    Smoking status: Never Smoker    Smokeless tobacco: Never Used   Vaping Use    Vaping Use: Never used   Substance and Sexual Activity    Alcohol use: No    Drug use: No    Sexual activity: Not on file   Other Topics Concern    Not on file   Social History Narrative    Not on file     Social Determinants of Health     Financial Resource Strain:     Difficulty of Paying Living Expenses: Not on file   Food Insecurity:     Worried About Running Out of Food in the Last Year: Not on file    Gurinder of Food in the Last Year: Not on file   Transportation Needs:     Lack of Transportation (Medical): Not on file    Lack of Transportation (Non-Medical):  Not on file   Physical Activity:     Days of Exercise per Week: Not on file    Minutes of Exercise per Session: Not on file   Stress:     Feeling of Stress : Not on file   Social Connections:     Frequency of Communication with Friends and Family: Not on file    Frequency of Social Gatherings with Friends and Family: Not on file    Attends [employer] Services: Not on file    Active Member of Clubs or Organizations: Not on file    Attends Club or Organization Meetings: Not on file    Marital Status: Not on file   Intimate Partner Violence:     Fear of Current or Ex-Partner: Not on file    Emotionally Abused: Not on file    Physically Abused: Not on file    Sexually Abused: Not on file   Housing Stability:     Unable to Pay for Housing in the Last Year: Not on file    Number of [place] in the Last Year: Not on file    Unstable Housing in the Last Year: Not on file       Allergies:  No Known Allergies    Current Medications:  Current Outpatient Medications   Medication Sig Dispense Refill    furosemide (LASIX) 20 MG tablet Take 1 tablet by mouth daily 30 tablet 2    meloxicam (MOBIC) 15 MG tablet TAKE 1 TABLET BY MOUTH EVERY DAY 90 tablet 3    D3-50 1.25 MG (07485 UT) CAPS TAKE 1 CAPSULE BY MOUTH EVERY WEEK 12 capsule 0    DULoxetine (CYMBALTA) 60 MG extended release capsule Take 1 capsule by mouth daily 90 capsule 3    metoprolol tartrate (LOPRESSOR) 50 MG tablet Take 1 tablet by mouth 2 times daily 180 tablet 3    ARIPiprazole (ABILIFY) 5 MG tablet Take 1 tablet by mouth daily 90 tablet 3    atorvastatin (LIPITOR) 40 MG tablet TAKE 1 TABLET BY MOUTH EVERY DAY 90 tablet 3    meclizine (ANTIVERT) 25 MG tablet TAKE 1 TABLET BY MOUTH EVERY 8 HOURS AS NEEDED      naproxen (NAPROSYN) 500 MG tablet TAKE 1 TABLET BY MOUTH EVERY 12 HOURS      diclofenac sodium (VOLTAREN) 1 % GEL APPLY TO AFFECTED AREA THREE TIMES PER DAY       No current facility-administered medications for this visit.        Physical Exam:  /80   Pulse 86   Resp 18   Ht 5' 4\" (1.626 m)   Wt 250 lb 11.2 oz (113.7 kg)   BMI 43.03 kg/m²   Wt Readings from Last 3 Encounters:   03/10/22 250 lb 11.2 oz (113.7 kg)   02/25/22 247 lb (112 kg)   01/05/22 247 lb (112 kg)     Appearance: Awake, alert and oriented x 3, no acute respiratory distress  Skin: Intact, no rash  Head: Normocephalic, atraumatic  Eyes: EOMI, no conjunctival erythema  ENMT: No pharyngeal erythema, MMM, no rhinorrhea  Neck: Supple, no elevated JVP, no carotid bruits  Lungs: Clear to auscultation bilaterally. No wheezes, rales, or rhonchi.   Cardiac: Regular rate and rhythm, +S1S2, no murmurs apparent  Abdomen: Soft, nontender, +bowel sounds  Extremities: Moves all extremities x 4, no lower extremity edema  Neurologic: No focal motor deficits apparent, normal mood and affect, alert and oriented x 3  Peripheral Pulses: Intact posterior tibial pulses bilaterally    Laboratory Tests:  Lab Results   Component Value Date    CREATININE 0.7 02/25/2022    BUN 13 02/25/2022     02/25/2022    K 3.8 02/25/2022     02/25/2022    CO2 24 02/25/2022     Lab Results   Component Value Date    MG 2.1 10/10/2019     Lab Results   Component Value Date    WBC 7.7 02/25/2022    HGB 12.8 02/25/2022    HCT 41.2 02/25/2022    MCV 89.2 02/25/2022     02/25/2022     Lab Results   Component Value Date    ALT 14 02/25/2022    AST 14 02/25/2022    ALKPHOS 115 (H) 02/25/2022    BILITOT 0.3 02/25/2022     Lab Results   Component Value Date    CKTOTAL 111 04/25/2015    CKMB 2.3 04/25/2015    TROPONINI <0.01 11/20/2019    TROPONINI <0.01 11/19/2019    TROPONINI <0.01 11/19/2019     Lab Results   Component Value Date    INR 1.1 05/28/2019    INR 1.1 04/25/2015    INR 1.1 05/07/2014    PROTIME 12.5 (H) 05/28/2019    PROTIME 11.9 04/25/2015    PROTIME 11.9 05/07/2014     Lab Results   Component Value Date    TSH 1.930 02/25/2022     Lab Results   Component Value Date    LABA1C 6.0 (H) 12/06/2021     No results found for: EAG  Lab Results   Component Value Date    CHOL 209 (H) 02/25/2022    CHOL 219 (H) 12/06/2021    CHOL 239 (H) 10/12/2021     Lab Results   Component Value Date    TRIG 152 (H) 02/25/2022    TRIG 143 12/06/2021    TRIG 158 (H) 10/12/2021     Lab Results   Component Value Date    HDL 50 02/25/2022    HDL 52 12/06/2021    HDL 50 10/12/2021     Lab Results   Component Value Date    LDLCALC 129 (H) 02/25/2022    LDLCALC 138 (H) 12/06/2021    LDLCALC 157 (H) 10/12/2021     Lab Results   Component Value Date    LABVLDL 30 02/25/2022    LABVLDL 29 12/06/2021    LABVLDL 32 10/12/2021     No results found for: CHOLHDLRATIO    Cardiac Tests:  ECG: Normal sinus rhythm, left atrial margin, nonspecific T wave changes, abnormal EKG      Echocardiogram- 3/2/22:   Normal left ventricle size and systolic function. Ejection fraction is visually estimated at 55-60%. No regional wall motion abnormalities seen. Normal left ventricle wall thickness. Normal diastolic function. Normal right ventricular size and function. TAPSE 20 mm. No hemodynamically significant aortic stenosis is present. Physiologic and/or trace tricuspid regurgitation. RVSP is 33 mmHg. Normal PA systolic pressure. No evidence for hemodynamically significant pericardial effusion. Stress test - 11/19/19:    1. No reversible perfusion defect   2. Ejection fraction is 55 %. 3. No significant wall motion abnormality             Cardiac catheterization:     The 10-year ASCVD risk score (Karrie Davis., et al., 2013) is: 6.4%    Values used to calculate the score:      Age: 64 years      Sex: Female      Is Non- : No      Diabetic: Yes      Tobacco smoker: No      Systolic Blood Pressure: 239 mmHg      Is BP treated: Yes      HDL Cholesterol: 50 mg/dL      Total Cholesterol: 209 mg/dL        ASSESSMENT:  · Chronic dyspnea which is multifactorial.  Echocardiogram showed normal LV systolic and diastolic function and no evidence of pulmonary hypertension. Stress test in 2019 was normal.  Continue sternal chest pain most likely noncardiac but will do coronary CTA to rule out any CAD. · Morbid obesity with possible obstructive sleep apnea  · Palpitations and recent Holter monitor was normal  · History of atrial septal aneurysm without any shunts. Plan:   1. Schedule for a coronary CTA to rule out CAD due to exertional dyspnea, she is unable to exercise  2.  Echo results were reviewed and discussed with the patient. It showed normal LV systolic diastolic function. Normal RV size and function in normal valve functions. 3. Advised to practice intermittent fasting for weight loss management. 4. Advised to get a sleep study to rule out sleep apnea  5. Daily swimming to lose weight   6. Continue current mediations   7. Follow up with me 3 months    The patient's current medication list, allergies, problem list and results of all previously ordered testing were reviewed at today's visit.   Isaak Serrano MD  [facility]) Cardiology

## 2022-03-11 ENCOUNTER — TELEPHONE (OUTPATIENT)
Dept: PRIMARY CARE CLINIC | Age: 57
End: 2022-03-11

## 2022-03-11 NOTE — TELEPHONE ENCOUNTER
Pt calling in and saw Dr Roseanne Jarvis yesterday and he is suggesting she have a sleep study, PFT & a covid antibody test done. She is also asking for a referral for numbness/tingling in her Right ring finger and pinky, state she saw Dr Ryan Wilkinson in the past for her Left ring finger and pinky. Rivas Ingram should be faxing over form to fill out for her electric and would like a call from [place] when this is received. I gave her both fax numbers for them to send it to.

## 2022-03-11 NOTE — TELEPHONE ENCOUNTER
Tried to fax several times and kept getting the rejections of \"NG\". Spoke with Manjeet and was able to get an email. Form was emailed to [email or web address]

## 2022-03-14 ENCOUNTER — TELEPHONE (OUTPATIENT)
Dept: PRIMARY CARE CLINIC | Age: 57
End: 2022-03-14

## 2022-03-14 ENCOUNTER — OFFICE VISIT (OUTPATIENT)
Dept: PRIMARY CARE CLINIC | Age: 57
End: 2022-03-14
Payer: COMMERCIAL

## 2022-03-14 VITALS
RESPIRATION RATE: 18 BRPM | OXYGEN SATURATION: 99 % | HEART RATE: 80 BPM | DIASTOLIC BLOOD PRESSURE: 84 MMHG | WEIGHT: 252 LBS | HEIGHT: 64 IN | SYSTOLIC BLOOD PRESSURE: 126 MMHG | BODY MASS INDEX: 43.02 KG/M2 | TEMPERATURE: 97.6 F

## 2022-03-14 DIAGNOSIS — Z20.822 EXPOSURE TO COVID-19 VIRUS: ICD-10-CM

## 2022-03-14 DIAGNOSIS — G47.33 OSA (OBSTRUCTIVE SLEEP APNEA): ICD-10-CM

## 2022-03-14 DIAGNOSIS — Z20.822 EXPOSURE TO COVID-19 VIRUS: Primary | ICD-10-CM

## 2022-03-14 DIAGNOSIS — R06.02 SOB (SHORTNESS OF BREATH): Primary | ICD-10-CM

## 2022-03-14 LAB — SARS-COV-2 ANTIBODY, TOTAL: NORMAL

## 2022-03-14 PROCEDURE — 99214 OFFICE O/P EST MOD 30 MIN: CPT | Performed by: FAMILY MEDICINE

## 2022-03-14 PROCEDURE — 3017F COLORECTAL CA SCREEN DOC REV: CPT | Performed by: FAMILY MEDICINE

## 2022-03-14 PROCEDURE — G8417 CALC BMI ABV UP PARAM F/U: HCPCS | Performed by: FAMILY MEDICINE

## 2022-03-14 PROCEDURE — 1036F TOBACCO NON-USER: CPT | Performed by: FAMILY MEDICINE

## 2022-03-14 PROCEDURE — G8427 DOCREV CUR MEDS BY ELIG CLIN: HCPCS | Performed by: FAMILY MEDICINE

## 2022-03-14 PROCEDURE — G8484 FLU IMMUNIZE NO ADMIN: HCPCS | Performed by: FAMILY MEDICINE

## 2022-03-14 RX ORDER — METHOCARBAMOL 750 MG/1
TABLET ORAL
Qty: 12 CAPSULE | Refills: 0 | Status: SHIPPED
Start: 2022-03-14 | End: 2022-05-31

## 2022-03-14 ASSESSMENT — ENCOUNTER SYMPTOMS
COLOR CHANGE: 0
DIARRHEA: 0
APNEA: 0
COUGH: 0
FACIAL SWELLING: 0
VOMITING: 0
ALLERGIC/IMMUNOLOGIC NEGATIVE: 1
SHORTNESS OF BREATH: 0
SINUS PRESSURE: 0
SORE THROAT: 0
BLOOD IN STOOL: 0
CHEST TIGHTNESS: 0
NAUSEA: 0
BACK PAIN: 0
WHEEZING: 0
PHOTOPHOBIA: 0
ABDOMINAL PAIN: 0

## 2022-03-14 NOTE — PROGRESS NOTES
Chief Complaint:   Chief Complaint   Patient presents with    Referral - General     sleep studay    Numbness     now in the other hand.  Follow-up       Shortness of Breath  This is a chronic problem. The current episode started more than 1 month ago. The problem occurs every few minutes. The problem has been waxing and waning. Pertinent negatives include no abdominal pain, chest pain, headaches, leg swelling, rash, sore throat, vomiting or wheezing.        Patient Active Problem List   Diagnosis    Palpitations    Atypical chest pain    Atrial septal aneurysm    Type 2 diabetes mellitus without complication, without long-term current use of insulin ([facility] [address]    Hyperlipidemia    Essential hypertension       Past Medical History:   Diagnosis Date    Atrial septal aneurysm     Gall bladder disease     Herniated disc     Hyperlipidemia     Hypertension     Infectious lymphangitis of deep lymphatics     Kidney stone        Past Surgical History:   Procedure Laterality Date     SECTION      WISDOM TOOTH EXTRACTION         Current Outpatient Medications   Medication Sig Dispense Refill    D3-50 1.25 MG (86607 UT) CAPS TAKE 1 CAPSULE BY MOUTH ONE TIME PER WEEK 12 capsule 0    furosemide (LASIX) 20 MG tablet Take 1 tablet by mouth daily 30 tablet 2    meloxicam (MOBIC) 15 MG tablet TAKE 1 TABLET BY MOUTH EVERY DAY 90 tablet 3    DULoxetine (CYMBALTA) 60 MG extended release capsule Take 1 capsule by mouth daily 90 capsule 3    metoprolol tartrate (LOPRESSOR) 50 MG tablet Take 1 tablet by mouth 2 times daily 180 tablet 3    ARIPiprazole (ABILIFY) 5 MG tablet Take 1 tablet by mouth daily 90 tablet 3    atorvastatin (LIPITOR) 40 MG tablet TAKE 1 TABLET BY MOUTH EVERY DAY 90 tablet 3    meclizine (ANTIVERT) 25 MG tablet TAKE 1 TABLET BY MOUTH EVERY 8 HOURS AS NEEDED      naproxen (NAPROSYN) 500 MG tablet TAKE 1 TABLET BY MOUTH EVERY 12 HOURS      diclofenac sodium (VOLTAREN) 1 % GEL APPLY TO AFFECTED AREA THREE TIMES PER DAY       No current facility-administered medications for this visit. No Known Allergies    Social History     Socioeconomic History    Marital status: Legally      Spouse name: None    Number of children: None    Years of education: None    Highest education level: None   Occupational History    None   Tobacco Use    Smoking status: Never Smoker    Smokeless tobacco: Never Used   Vaping Use    Vaping Use: Never used   Substance and Sexual Activity    Alcohol use: No    Drug use: No    Sexual activity: None   Other Topics Concern    None   Social History Narrative    None     Social Determinants of Health     Financial Resource Strain:     Difficulty of Paying Living Expenses: Not on file   Food Insecurity:     Worried About Running Out of Food in the Last Year: Not on file    Gurinder of Food in the Last Year: Not on file   Transportation Needs:     Lack of Transportation (Medical): Not on file    Lack of Transportation (Non-Medical):  Not on file   Physical Activity:     Days of Exercise per Week: Not on file    Minutes of Exercise per Session: Not on file   Stress:     Feeling of Stress : Not on file   Social Connections:     Frequency of Communication with Friends and Family: Not on file    Frequency of Social Gatherings with Friends and Family: Not on file    Attends [employer] Services: Not on file    Active Member of [address] [place] [address] [employer] or Organizations: Not on file    Attends Club or Organization Meetings: Not on file    Marital Status: Not on file   Intimate Partner Violence:     Fear of Current or Ex-Partner: Not on file    Emotionally Abused: Not on file    Physically Abused: Not on file    Sexually Abused: Not on file   Housing Stability:     Unable to Pay for Housing in the Last Year: Not on file    Number of [place] in the Last Year: Not on file    Unstable Housing in the Last Year: Not on file       Family History   Problem Relation Age of Onset    Colon Cancer Father     Cancer Sister     Heart Disease Brother     Arthritis Brother     Hypertension Mother     Arthritis Mother     Heart Disease Mother          Review of Systems   Constitutional: Negative. HENT: Negative for congestion, facial swelling, hearing loss, nosebleeds, sinus pressure and sore throat. Eyes: Negative for photophobia and visual disturbance. Respiratory: Negative for apnea, cough, chest tightness, shortness of breath and wheezing. Cardiovascular: Negative for chest pain, palpitations and leg swelling. Gastrointestinal: Negative for abdominal pain, blood in stool, diarrhea, nausea and vomiting. Genitourinary: Negative for difficulty urinating, frequency and urgency. Musculoskeletal: Negative for arthralgias, back pain, joint swelling and myalgias. Skin: Negative for color change and rash. Allergic/Immunologic: Negative. Neurological: Negative for syncope, weakness, light-headedness and headaches. Hematological: Negative. Psychiatric/Behavioral: Negative for agitation, behavioral problems, confusion and self-injury. The patient is not nervous/anxious. All other systems reviewed and are negative. Physical Exam  Vitals and nursing note reviewed. Constitutional:       General: She is not in acute distress. Appearance: She is well-developed. HENT:      Head: Normocephalic and atraumatic. Nose: Nose normal.   Eyes:      Conjunctiva/sclera: Conjunctivae normal.      Pupils: Pupils are equal, round, and reactive to light. Neck:      Thyroid: No thyromegaly. Vascular: No JVD. Cardiovascular:      Rate and Rhythm: Normal rate and regular rhythm. Heart sounds: Normal heart sounds. No murmur heard. No friction rub. No gallop. Pulmonary:      Effort: Pulmonary effort is normal. No respiratory distress. Breath sounds: Normal breath sounds. No wheezing.    Abdominal:      General: Bowel sounds are normal. There is no distension. Palpations: Abdomen is soft. Tenderness: There is no abdominal tenderness. There is no guarding or rebound. Musculoskeletal:         General: Normal range of motion. Cervical back: Normal range of motion and neck supple. Lymphadenopathy:      Cervical: No cervical adenopathy. Skin:     General: Skin is warm and dry. Findings: No erythema or rash. Neurological:      Mental Status: She is alert and oriented to person, place, and time. Cranial Nerves: No cranial nerve deficit. Motor: No abnormal muscle tone. Coordination: Coordination normal.      Deep Tendon Reflexes: Reflexes are normal and symmetric. Psychiatric:         Behavior: Behavior normal.         Judgment: Judgment normal.                               ASSESSMENT/PLAN:    Karyn Brock was seen today for referral - general, numbness and follow-up. Diagnoses and all orders for this visit:    SOB (shortness of breath)  -     Sleep Study with PAP Titration; Future  -     [facility] ([facility]) - Marlys Cat MD, Pulmonary and Critical Care [place]    CHRISTIAN (obstructive sleep apnea)  -     Sleep Study with PAP Titration;  Future  -     [facility] ([facility]) - Marlys Cat MD, Pulmonary and [facility], DO    3/14/2022  10:41 AM

## 2022-03-14 NOTE — LETTER
[address]  [place] [address]  Phone: [phone]  Fax: [facility]         March 14, 2022     Patient: Adrien Bailey   YOB: 1965   Date of Visit: 3/14/2022       To Whom It May Concern: It is my medical opinion that Boyd Serrano requires a disability parking placard for the following reasons:  She has limited walking ability due to an arthritic condition. Duration of need: permanent    If you have any questions or concerns, please don't hesitate to call.     Sincerely,        Marti Norris DO

## 2022-03-18 ENCOUNTER — TELEPHONE (OUTPATIENT)
Dept: ADMINISTRATIVE | Age: 57
End: 2022-03-18

## 2022-03-18 NOTE — TELEPHONE ENCOUNTER
MA spoke with patient, changed appt to 3/31 with Dr. Ron Sterling    Electronically signed by Jeremias Reardon MA on 3/18/22 at 9:41 AM EDT

## 2022-03-19 DIAGNOSIS — M79.89 LEG SWELLING: ICD-10-CM

## 2022-03-19 DIAGNOSIS — R06.02 SOB (SHORTNESS OF BREATH): ICD-10-CM

## 2022-03-21 RX ORDER — FUROSEMIDE 20 MG/1
TABLET ORAL
Qty: 30 TABLET | Refills: 2 | Status: SHIPPED
Start: 2022-03-21 | End: 2022-05-31

## 2022-03-31 ENCOUNTER — OFFICE VISIT (OUTPATIENT)
Dept: ENT CLINIC | Age: 57
End: 2022-03-31
Payer: COMMERCIAL

## 2022-03-31 ENCOUNTER — TELEPHONE (OUTPATIENT)
Dept: ADMINISTRATIVE | Age: 57
End: 2022-03-31

## 2022-03-31 VITALS
WEIGHT: 250 LBS | HEART RATE: 92 BPM | DIASTOLIC BLOOD PRESSURE: 92 MMHG | HEIGHT: 64 IN | TEMPERATURE: 97 F | BODY MASS INDEX: 42.68 KG/M2 | OXYGEN SATURATION: 96 % | SYSTOLIC BLOOD PRESSURE: 141 MMHG

## 2022-03-31 DIAGNOSIS — D17.0 LIPOMA OF FACE: Primary | ICD-10-CM

## 2022-03-31 PROCEDURE — 1036F TOBACCO NON-USER: CPT | Performed by: OTOLARYNGOLOGY

## 2022-03-31 PROCEDURE — 99204 OFFICE O/P NEW MOD 45 MIN: CPT | Performed by: OTOLARYNGOLOGY

## 2022-03-31 PROCEDURE — 3017F COLORECTAL CA SCREEN DOC REV: CPT | Performed by: OTOLARYNGOLOGY

## 2022-03-31 PROCEDURE — G8484 FLU IMMUNIZE NO ADMIN: HCPCS | Performed by: OTOLARYNGOLOGY

## 2022-03-31 PROCEDURE — G8427 DOCREV CUR MEDS BY ELIG CLIN: HCPCS | Performed by: OTOLARYNGOLOGY

## 2022-03-31 PROCEDURE — G8417 CALC BMI ABV UP PARAM F/U: HCPCS | Performed by: OTOLARYNGOLOGY

## 2022-03-31 ASSESSMENT — ENCOUNTER SYMPTOMS
RESPIRATORY NEGATIVE: 1
CHEST TIGHTNESS: 0
EYE DISCHARGE: 0
EYES NEGATIVE: 1
ABDOMINAL PAIN: 0
GASTROINTESTINAL NEGATIVE: 1
VOMITING: 0
EYE PAIN: 0
APNEA: 0
DIARRHEA: 0
FACIAL SWELLING: 1
COLOR CHANGE: 0
SHORTNESS OF BREATH: 0

## 2022-03-31 NOTE — PROGRESS NOTES
Subjective:      Patient ID:  Amada Barrow is a 64 y.o. female. HPI:    Patient presents today for lipoma of the face. Condition has been present for 3 month(s). Pt noticed it and had Ct scan in  which diagnosed a lipoma in the right cheek. Past Medical History:   Diagnosis Date    Atrial septal aneurysm     Gall bladder disease     Herniated disc     Hyperlipidemia     Hypertension     Infectious lymphangitis of deep lymphatics     Kidney stone      Past Surgical History:   Procedure Laterality Date     SECTION      WISDOM TOOTH EXTRACTION       Family History   Problem Relation Age of Onset    Colon Cancer Father     Cancer Sister     Heart Disease Brother     Arthritis Brother     Hypertension Mother     Arthritis Mother     Heart Disease Mother      Social History     Socioeconomic History    Marital status: Legally      Spouse name: None    Number of children: None    Years of education: None    Highest education level: None   Occupational History    None   Tobacco Use    Smoking status: Never Smoker    Smokeless tobacco: Never Used   Vaping Use    Vaping Use: Never used   Substance and Sexual Activity    Alcohol use: No    Drug use: No    Sexual activity: None   Other Topics Concern    None   Social History Narrative    None     Social Determinants of Health     Financial Resource Strain:     Difficulty of Paying Living Expenses: Not on file   Food Insecurity:     Worried About Running Out of Food in the Last Year: Not on file    Gurinder of Food in the Last Year: Not on file   Transportation Needs:     Lack of Transportation (Medical): Not on file    Lack of Transportation (Non-Medical):  Not on file   Physical Activity:     Days of Exercise per Week: Not on file    Minutes of Exercise per Session: Not on file   Stress:     Feeling of Stress : Not on file   Social Connections:     Frequency of Communication with Friends and Family: Not on file    Frequency of Social Gatherings with Friends and Family: Not on file    Attends Gnosticist Services: Not on file    Active Member of Clubs or Organizations: Not on file    Attends Club or Organization Meetings: Not on file    Marital Status: Not on file   Intimate Partner Violence:     Fear of Current or Ex-Partner: Not on file    Emotionally Abused: Not on file    Physically Abused: Not on file    Sexually Abused: Not on file   Housing Stability:     Unable to Pay for Housing in the Last Year: Not on file    Number of [place] in the Last Year: Not on file    Unstable Housing in the Last Year: Not on file     No Known Allergies    Review of Systems   Constitutional: Negative. Negative for appetite change. HENT: Positive for facial swelling. Eyes: Negative. Negative for pain, discharge and visual disturbance. Respiratory: Negative. Negative for apnea, chest tightness and shortness of breath. Cardiovascular: Negative. Negative for chest pain, palpitations and leg swelling. Gastrointestinal: Negative. Negative for abdominal pain, diarrhea and vomiting. Endocrine: Negative for cold intolerance, heat intolerance and polydipsia. Genitourinary: Negative. Negative for dysuria, flank pain and hematuria. Musculoskeletal: Negative. Negative for arthralgias, gait problem and neck pain. Skin: Negative. Negative for color change, pallor and rash. Allergic/Immunologic: Negative for environmental allergies, food allergies and immunocompromised state. Neurological: Negative. Negative for dizziness, numbness and headaches. Hematological: Negative for adenopathy. Psychiatric/Behavioral: Negative. Negative for behavioral problems and hallucinations. All other systems reviewed and are negative. Objective:     Vitals:    03/31/22 1449   BP: (!) 141/92   Pulse: 92   Temp: 97 °F (36.1 °C)   SpO2: 96%     Physical Exam  Vitals and nursing note reviewed. Constitutional:       Appearance: She is well-developed. HENT:      Head: Normocephalic and atraumatic. Comments: Right zygomatic arch has asymmetry over the mid arch. No fluctuance in the area more hard and erythematous tissue. Nose: Nose normal.      Mouth/Throat:      Pharynx: Uvula midline. Eyes:      Conjunctiva/sclera: Conjunctivae normal.      Pupils: Pupils are equal, round, and reactive to light. Cardiovascular:      Rate and Rhythm: Normal rate and regular rhythm. Heart sounds: Normal heart sounds. Pulmonary:      Effort: Pulmonary effort is normal.      Breath sounds: Normal breath sounds. Abdominal:      General: Bowel sounds are normal.      Palpations: Abdomen is soft. Musculoskeletal:      Cervical back: Normal range of motion and neck supple. Skin:     General: Skin is warm and dry. Neurological:      Mental Status: She is alert and oriented to person, place, and time. Assessment:       Diagnosis Orders   1. Lipoma of face                Plan:      CT scan face needs to be evaluated. I will have the patient return with the CD to discuss the options of removal or waiting.    Follow up in 1 week(s)

## 2022-04-04 ENCOUNTER — CLINICAL DOCUMENTATION (OUTPATIENT)
Dept: PRIMARY CARE CLINIC | Age: 57
End: 2022-04-04

## 2022-04-04 NOTE — PROGRESS NOTES
Medical certificate resent to [address] with the updated date of today 4/4. Med certificate was faxed twice and emailed over. I contacted [employer] to check status on receiving the cert, but they explained that it could take 24 hours to receive and enter into the system. I confirmed fax numbers and email for them. Scanned all faxed certs and confirmation into computer.  Called patient to let her, but no answer and unable to leave a message

## 2022-04-07 ENCOUNTER — OFFICE VISIT (OUTPATIENT)
Dept: ENT CLINIC | Age: 57
End: 2022-04-07
Payer: COMMERCIAL

## 2022-04-07 VITALS — BODY MASS INDEX: 42.68 KG/M2 | HEIGHT: 64 IN | WEIGHT: 250 LBS

## 2022-04-07 DIAGNOSIS — D17.0 LIPOMA OF FACE: Primary | ICD-10-CM

## 2022-04-07 PROCEDURE — G8417 CALC BMI ABV UP PARAM F/U: HCPCS | Performed by: OTOLARYNGOLOGY

## 2022-04-07 PROCEDURE — 99213 OFFICE O/P EST LOW 20 MIN: CPT | Performed by: OTOLARYNGOLOGY

## 2022-04-07 PROCEDURE — 1036F TOBACCO NON-USER: CPT | Performed by: OTOLARYNGOLOGY

## 2022-04-07 PROCEDURE — G8427 DOCREV CUR MEDS BY ELIG CLIN: HCPCS | Performed by: OTOLARYNGOLOGY

## 2022-04-07 PROCEDURE — 3017F COLORECTAL CA SCREEN DOC REV: CPT | Performed by: OTOLARYNGOLOGY

## 2022-04-07 ASSESSMENT — ENCOUNTER SYMPTOMS
RESPIRATORY NEGATIVE: 1
APNEA: 0
GASTROINTESTINAL NEGATIVE: 1
EYE PAIN: 0
DIARRHEA: 0
ABDOMINAL PAIN: 0
FACIAL SWELLING: 1
EYES NEGATIVE: 1
COLOR CHANGE: 0
EYE DISCHARGE: 0
VOMITING: 0
SHORTNESS OF BREATH: 0
CHEST TIGHTNESS: 0

## 2022-04-07 NOTE — PROGRESS NOTES
Subjective:      Patient ID:  Alcira Blackwood is a 64 y.o. female. HPI:    Patient presents today for recheck of CT . Condition has been present for 1 week(s). Here for CT review      Past Medical History:   Diagnosis Date    Atrial septal aneurysm     Gall bladder disease     Herniated disc     Hyperlipidemia     Hypertension     Infectious lymphangitis of deep lymphatics     Kidney stone      Past Surgical History:   Procedure Laterality Date     SECTION      WISDOM TOOTH EXTRACTION       Family History   Problem Relation Age of Onset    Colon Cancer Father     Cancer Sister     Heart Disease Brother     Arthritis Brother     Hypertension Mother     Arthritis Mother     Heart Disease Mother      Social History     Socioeconomic History    Marital status: Legally      Spouse name: None    Number of children: None    Years of education: None    Highest education level: None   Occupational History    None   Tobacco Use    Smoking status: Never Smoker    Smokeless tobacco: Never Used   Vaping Use    Vaping Use: Never used   Substance and Sexual Activity    Alcohol use: No    Drug use: No    Sexual activity: None   Other Topics Concern    None   Social History Narrative    None     Social Determinants of Health     Financial Resource Strain:     Difficulty of Paying Living Expenses: Not on file   Food Insecurity:     Worried About Running Out of Food in the Last Year: Not on file    Gurinder of Food in the Last Year: Not on file   Transportation Needs:     Lack of Transportation (Medical): Not on file    Lack of Transportation (Non-Medical):  Not on file   Physical Activity:     Days of Exercise per Week: Not on file    Minutes of Exercise per Session: Not on file   Stress:     Feeling of Stress : Not on file   Social Connections:     Frequency of Communication with Friends and Family: Not on file    Frequency of Social Gatherings with Friends and Family: Not on file    Attends Anabaptist Services: Not on file    Active Member of Clubs or Organizations: Not on file    Attends Club or Organization Meetings: Not on file    Marital Status: Not on file   Intimate Partner Violence:     Fear of Current or Ex-Partner: Not on file    Emotionally Abused: Not on file    Physically Abused: Not on file    Sexually Abused: Not on file   Housing Stability:     Unable to Pay for Housing in the Last Year: Not on file    Number of [place] in the Last Year: Not on file    Unstable Housing in the Last Year: Not on file     No Known Allergies    Review of Systems   Constitutional: Negative. Negative for appetite change. HENT: Positive for facial swelling. Eyes: Negative. Negative for pain, discharge and visual disturbance. Respiratory: Negative. Negative for apnea, chest tightness and shortness of breath. Cardiovascular: Negative. Negative for chest pain, palpitations and leg swelling. Gastrointestinal: Negative. Negative for abdominal pain, diarrhea and vomiting. Endocrine: Negative for cold intolerance, heat intolerance and polydipsia. Genitourinary: Negative. Negative for dysuria, flank pain and hematuria. Musculoskeletal: Negative. Negative for arthralgias, gait problem and neck pain. Skin: Negative. Negative for color change, pallor and rash. Allergic/Immunologic: Negative for environmental allergies, food allergies and immunocompromised state. Neurological: Negative. Negative for dizziness, numbness and headaches. Hematological: Negative for adenopathy. Psychiatric/Behavioral: Negative. Negative for behavioral problems and hallucinations. All other systems reviewed and are negative. Objective: There were no vitals filed for this visit. Physical Exam  Vitals and nursing note reviewed. Constitutional:       Appearance: She is well-developed. HENT:      Head: Normocephalic and atraumatic.         Comments: Right zygomatic arch has asymmetry over the mid arch. No fluctuance in the area more hard and erythematous tissue. Nose: Nose normal.      Mouth/Throat:      Pharynx: Uvula midline. Eyes:      Conjunctiva/sclera: Conjunctivae normal.      Pupils: Pupils are equal, round, and reactive to light. Cardiovascular:      Rate and Rhythm: Normal rate and regular rhythm. Heart sounds: Normal heart sounds. Pulmonary:      Effort: Pulmonary effort is normal.      Breath sounds: Normal breath sounds. Abdominal:      General: Bowel sounds are normal.      Palpations: Abdomen is soft. Musculoskeletal:      Cervical back: Normal range of motion and neck supple. Skin:     General: Skin is warm and dry. Neurological:      Mental Status: She is alert and oriented to person, place, and time. CT                         Assessment:       Diagnosis Orders   1. Lipoma of face                Plan:      Discussed the Ct scan with the patient and it is an indeterminate mass at this point and I don't think surgery has a good risk benefit ratio at this time. I wll get another Ct in 6 motnhs and review again. Call or return to clinic prn if these symptoms worsen or fail to improve as anticipated.     Follow up in 6 month(s)

## 2022-04-22 ENCOUNTER — TELEPHONE (OUTPATIENT)
Dept: PRIMARY CARE CLINIC | Age: 57
End: 2022-04-22

## 2022-04-22 NOTE — TELEPHONE ENCOUNTER
Patient misplaced her handicap placard script and is requesting a new one. Please call patient when it is ready to  at the office.

## 2022-04-22 NOTE — LETTER
[address] [employer]  Vaughn VIZCAINO [address]  Phone: [phone]  Fax: [address], DO         April 22, 2022     Patient: David Oliver   YOB: 1965   Date of Visit: 4/22/2022       To Whom It May Concern: It is my medical opinion that Rosina Cui requires a disability parking placard for the following reasons:  She has limited walking ability due to an arthritic condition. Duration of need: 5 years    If you have any questions or concerns, please don't hesitate to call.     Sincerely,        Nathen Olivas, DO

## 2022-04-25 ENCOUNTER — OFFICE VISIT (OUTPATIENT)
Dept: FAMILY MEDICINE CLINIC | Age: 57
End: 2022-04-25
Payer: COMMERCIAL

## 2022-04-25 VITALS — BODY MASS INDEX: 41.32 KG/M2 | WEIGHT: 242 LBS | RESPIRATION RATE: 18 BRPM | HEIGHT: 64 IN

## 2022-04-25 DIAGNOSIS — N39.0 URINARY TRACT INFECTION WITHOUT HEMATURIA, SITE UNSPECIFIED: ICD-10-CM

## 2022-04-25 DIAGNOSIS — N39.0 URINARY TRACT INFECTION WITHOUT HEMATURIA, SITE UNSPECIFIED: Primary | ICD-10-CM

## 2022-04-25 LAB
BILIRUBIN, POC: NORMAL
BLOOD URINE, POC: NORMAL
CLARITY, POC: NORMAL
COLOR, POC: NORMAL
GLUCOSE URINE, POC: NORMAL
KETONES, POC: NORMAL
LEUKOCYTE EST, POC: NORMAL
NITRITE, POC: NORMAL
PH, POC: 6
PROTEIN, POC: NORMAL
SPECIFIC GRAVITY, POC: >=1.03
UROBILINOGEN, POC: 0.2

## 2022-04-25 PROCEDURE — 1036F TOBACCO NON-USER: CPT | Performed by: FAMILY MEDICINE

## 2022-04-25 PROCEDURE — 3017F COLORECTAL CA SCREEN DOC REV: CPT | Performed by: FAMILY MEDICINE

## 2022-04-25 PROCEDURE — 81002 URINALYSIS NONAUTO W/O SCOPE: CPT | Performed by: FAMILY MEDICINE

## 2022-04-25 PROCEDURE — G8417 CALC BMI ABV UP PARAM F/U: HCPCS | Performed by: FAMILY MEDICINE

## 2022-04-25 PROCEDURE — G8427 DOCREV CUR MEDS BY ELIG CLIN: HCPCS | Performed by: FAMILY MEDICINE

## 2022-04-25 PROCEDURE — 99213 OFFICE O/P EST LOW 20 MIN: CPT | Performed by: FAMILY MEDICINE

## 2022-04-25 RX ORDER — NITROFURANTOIN 25; 75 MG/1; MG/1
100 CAPSULE ORAL 2 TIMES DAILY
Qty: 20 CAPSULE | Refills: 0 | Status: SHIPPED | OUTPATIENT
Start: 2022-04-25 | End: 2022-05-05

## 2022-04-25 ASSESSMENT — ENCOUNTER SYMPTOMS
COUGH: 0
PHOTOPHOBIA: 0
APNEA: 0
BACK PAIN: 0
SINUS PRESSURE: 0
SHORTNESS OF BREATH: 0
VOMITING: 0
DIARRHEA: 0
CHEST TIGHTNESS: 0
SORE THROAT: 0
WHEEZING: 0
FACIAL SWELLING: 0
ALLERGIC/IMMUNOLOGIC NEGATIVE: 1
BLOOD IN STOOL: 0
NAUSEA: 0
COLOR CHANGE: 0
ABDOMINAL PAIN: 0

## 2022-04-25 NOTE — PROGRESS NOTES
Chief Complaint:   Chief Complaint   Patient presents with    Urinary Tract Infection       Urinary Tract Infection   This is a new problem. The current episode started in the past 7 days. The problem has been gradually worsening. The quality of the pain is described as burning. The pain is at a severity of 1/10. There has been no fever. Pertinent negatives include no frequency, nausea, urgency or vomiting.        Patient Active Problem List   Diagnosis    Palpitations    Atypical chest pain    Atrial septal aneurysm    Type 2 diabetes mellitus without complication, without long-term current use of insulin ([facility] [address]    Hyperlipidemia    Essential hypertension       Past Medical History:   Diagnosis Date    Atrial septal aneurysm     Gall bladder disease     Herniated disc     Hyperlipidemia     Hypertension     Infectious lymphangitis of deep lymphatics     Kidney stone        Past Surgical History:   Procedure Laterality Date     SECTION      WISDOM TOOTH EXTRACTION         Current Outpatient Medications   Medication Sig Dispense Refill    nitrofurantoin, macrocrystal-monohydrate, (MACROBID) 100 MG capsule Take 1 capsule by mouth 2 times daily for 10 days 20 capsule 0    furosemide (LASIX) 20 MG tablet TAKE 1 TABLET BY MOUTH EVERY DAY 30 tablet 2    D3-50 1.25 MG (43761 UT) CAPS TAKE 1 CAPSULE BY MOUTH ONE TIME PER WEEK 12 capsule 0    meloxicam (MOBIC) 15 MG tablet TAKE 1 TABLET BY MOUTH EVERY DAY 90 tablet 3    DULoxetine (CYMBALTA) 60 MG extended release capsule Take 1 capsule by mouth daily 90 capsule 3    metoprolol tartrate (LOPRESSOR) 50 MG tablet Take 1 tablet by mouth 2 times daily 180 tablet 3    ARIPiprazole (ABILIFY) 5 MG tablet Take 1 tablet by mouth daily 90 tablet 3    atorvastatin (LIPITOR) 40 MG tablet TAKE 1 TABLET BY MOUTH EVERY DAY 90 tablet 3    meclizine (ANTIVERT) 25 MG tablet TAKE 1 TABLET BY MOUTH EVERY 8 HOURS AS NEEDED      naproxen (NAPROSYN) 500 MG tablet TAKE 1 TABLET BY MOUTH EVERY 12 HOURS      diclofenac sodium (VOLTAREN) 1 % GEL APPLY TO AFFECTED AREA THREE TIMES PER DAY       No current facility-administered medications for this visit. No Known Allergies    Social History     Socioeconomic History    Marital status: Legally      Spouse name: None    Number of children: None    Years of education: None    Highest education level: None   Occupational History    None   Tobacco Use    Smoking status: Never Smoker    Smokeless tobacco: Never Used   Vaping Use    Vaping Use: Never used   Substance and Sexual Activity    Alcohol use: No    Drug use: No    Sexual activity: None   Other Topics Concern    None   Social History Narrative    None     Social Determinants of Health     Financial Resource Strain:     Difficulty of Paying Living Expenses: Not on file   Food Insecurity:     Worried About Running Out of Food in the Last Year: Not on file    Gurinder of Food in the Last Year: Not on file   Transportation Needs:     Lack of Transportation (Medical): Not on file    Lack of Transportation (Non-Medical):  Not on file   Physical Activity:     Days of Exercise per Week: Not on file    Minutes of Exercise per Session: Not on file   Stress:     Feeling of Stress : Not on file   Social Connections:     Frequency of Communication with Friends and Family: Not on file    Frequency of Social Gatherings with Friends and Family: Not on file    Attends Amish Services: Not on file    Active Member of Clubs or Organizations: Not on file    Attends Club or Organization Meetings: Not on file    Marital Status: Not on file   Intimate Partner Violence:     Fear of Current or Ex-Partner: Not on file    Emotionally Abused: Not on file    Physically Abused: Not on file    Sexually Abused: Not on file   Housing Stability:     Unable to Pay for Housing in the Last Year: Not on file    Number of [place] in the Last Year: Not on file  Unstable Housing in the Last Year: Not on file       Family History   Problem Relation Age of Onset    Colon Cancer Father     Cancer Sister     Heart Disease Brother     Arthritis Brother     Hypertension Mother     Arthritis Mother     Heart Disease Mother          Review of Systems   Constitutional: Negative. HENT: Negative for congestion, facial swelling, hearing loss, nosebleeds, sinus pressure and sore throat. Eyes: Negative for photophobia and visual disturbance. Respiratory: Negative for apnea, cough, chest tightness, shortness of breath and wheezing. Cardiovascular: Negative for chest pain, palpitations and leg swelling. Gastrointestinal: Negative for abdominal pain, blood in stool, diarrhea, nausea and vomiting. Genitourinary: Positive for dysuria. Negative for difficulty urinating, frequency and urgency. Musculoskeletal: Negative for arthralgias, back pain, joint swelling and myalgias. Skin: Negative for color change and rash. Allergic/Immunologic: Negative. Neurological: Negative for syncope, weakness, light-headedness and headaches. Hematological: Negative. Psychiatric/Behavioral: Negative for agitation, behavioral problems, confusion and self-injury. The patient is not nervous/anxious. All other systems reviewed and are negative. Physical Exam  Vitals and nursing note reviewed. Constitutional:       General: She is not in acute distress. Appearance: She is well-developed. HENT:      Head: Normocephalic and atraumatic. Nose: Nose normal.   Eyes:      Conjunctiva/sclera: Conjunctivae normal.      Pupils: Pupils are equal, round, and reactive to light. Neck:      Thyroid: No thyromegaly. Vascular: No JVD. Cardiovascular:      Rate and Rhythm: Normal rate and regular rhythm. Heart sounds: Normal heart sounds. No murmur heard. No friction rub. No gallop. Pulmonary:      Effort: Pulmonary effort is normal. No respiratory distress. Breath sounds: Normal breath sounds. No wheezing. Abdominal:      General: Bowel sounds are normal. There is no distension. Palpations: Abdomen is soft. Tenderness: There is no abdominal tenderness. There is no guarding or rebound. Musculoskeletal:         General: Normal range of motion. Cervical back: Normal range of motion and neck supple. Lymphadenopathy:      Cervical: No cervical adenopathy. Skin:     General: Skin is warm and dry. Findings: No erythema or rash. Neurological:      Mental Status: She is alert and oriented to person, place, and time. Cranial Nerves: No cranial nerve deficit. Motor: No abnormal muscle tone. Coordination: Coordination normal.      Deep Tendon Reflexes: Reflexes are normal and symmetric. Psychiatric:         Behavior: Behavior normal.         Judgment: Judgment normal.                               ASSESSMENT/PLAN:    Ana Luisa Robles was seen today for urinary tract infection. Diagnoses and all orders for this visit:    Urinary tract infection without hematuria, site unspecified  -     POCT Urinalysis no Micro  -     Culture, Urine; Future  -     nitrofurantoin, macrocrystal-monohydrate, (MACROBID) 100 MG capsule;  Take 1 capsule by mouth 2 times daily for 10 days            Yovany Abdullahi DO    4/25/2022  10:56 AM

## 2022-04-26 LAB — MAMMOGRAPHY, EXTERNAL: NORMAL

## 2022-04-28 LAB
ORGANISM: ABNORMAL
URINE CULTURE, ROUTINE: ABNORMAL
URINE CULTURE, ROUTINE: ABNORMAL

## 2022-05-09 ENCOUNTER — TELEPHONE (OUTPATIENT)
Dept: PRIMARY CARE CLINIC | Age: 57
End: 2022-05-09

## 2022-05-09 NOTE — TELEPHONE ENCOUNTER
Pt calling stating she saw pulm today and in his office her BP was 175/95.  She does c/o of headache

## 2022-05-11 ENCOUNTER — OFFICE VISIT (OUTPATIENT)
Dept: PRIMARY CARE CLINIC | Age: 57
End: 2022-05-11
Payer: COMMERCIAL

## 2022-05-11 VITALS
HEIGHT: 64 IN | TEMPERATURE: 97.7 F | SYSTOLIC BLOOD PRESSURE: 150 MMHG | BODY MASS INDEX: 42.17 KG/M2 | DIASTOLIC BLOOD PRESSURE: 90 MMHG | WEIGHT: 247 LBS | RESPIRATION RATE: 18 BRPM | HEART RATE: 63 BPM | OXYGEN SATURATION: 97 %

## 2022-05-11 DIAGNOSIS — I10 UNCONTROLLED HYPERTENSION: Primary | ICD-10-CM

## 2022-05-11 DIAGNOSIS — J30.89 SEASONAL ALLERGIC RHINITIS DUE TO OTHER ALLERGIC TRIGGER: ICD-10-CM

## 2022-05-11 PROCEDURE — 99214 OFFICE O/P EST MOD 30 MIN: CPT | Performed by: FAMILY MEDICINE

## 2022-05-11 PROCEDURE — G8427 DOCREV CUR MEDS BY ELIG CLIN: HCPCS | Performed by: FAMILY MEDICINE

## 2022-05-11 PROCEDURE — G8417 CALC BMI ABV UP PARAM F/U: HCPCS | Performed by: FAMILY MEDICINE

## 2022-05-11 PROCEDURE — 1036F TOBACCO NON-USER: CPT | Performed by: FAMILY MEDICINE

## 2022-05-11 PROCEDURE — 3017F COLORECTAL CA SCREEN DOC REV: CPT | Performed by: FAMILY MEDICINE

## 2022-05-11 RX ORDER — METHYLPREDNISOLONE 4 MG/1
TABLET ORAL
Qty: 1 KIT | Refills: 0 | Status: SHIPPED | OUTPATIENT
Start: 2022-05-11

## 2022-05-11 RX ORDER — LORATADINE 10 MG/1
10 TABLET ORAL DAILY
Qty: 30 TABLET | Refills: 0 | Status: SHIPPED
Start: 2022-05-11 | End: 2022-05-31

## 2022-05-11 RX ORDER — VALSARTAN 80 MG/1
80 TABLET ORAL DAILY
Qty: 30 TABLET | Refills: 5 | Status: SHIPPED
Start: 2022-05-11 | End: 2022-06-06

## 2022-05-11 ASSESSMENT — ENCOUNTER SYMPTOMS
PHOTOPHOBIA: 0
WHEEZING: 0
CHEST TIGHTNESS: 0
FACIAL SWELLING: 0
TROUBLE SWALLOWING: 0
ABDOMINAL PAIN: 0
EYES NEGATIVE: 1
COLOR CHANGE: 0
BLOOD IN STOOL: 0
DIARRHEA: 0
NAUSEA: 0
SHORTNESS OF BREATH: 0
BACK PAIN: 0
SORE THROAT: 0
APNEA: 0
SINUS COMPLAINT: 1
COUGH: 0
ALLERGIC/IMMUNOLOGIC NEGATIVE: 1
SINUS PRESSURE: 1
RESPIRATORY NEGATIVE: 1
RHINORRHEA: 1
VOMITING: 0

## 2022-05-11 NOTE — PROGRESS NOTES
Chief Complaint:   Chief Complaint   Patient presents with    Hypertension    Blood Pressure Check    Cough    Congestion       Hypertension  This is a chronic problem. The current episode started more than 1 year ago. The problem has been gradually worsening since onset. The problem is uncontrolled. Pertinent negatives include no chest pain, headaches, palpitations or shortness of breath. Past treatments include beta blockers. The current treatment provides moderate improvement. There are no compliance problems. Sinus Problem  This is a new problem. The current episode started in the past 7 days. There has been no fever. Associated symptoms include congestion and sinus pressure. Pertinent negatives include no coughing, ear pain, headaches, shortness of breath or sore throat. Patient Active Problem List   Diagnosis    Palpitations    Atypical chest pain    Atrial septal aneurysm    Type 2 diabetes mellitus without complication, without long-term current use of insulin (HCC)    Hyperlipidemia    Essential hypertension       Past Medical History:   Diagnosis Date    Atrial septal aneurysm     Gall bladder disease     Herniated disc     Hyperlipidemia     Hypertension     Infectious lymphangitis of deep lymphatics     Kidney stone        Past Surgical History:   Procedure Laterality Date     SECTION      WISDOM TOOTH EXTRACTION         Current Outpatient Medications   Medication Sig Dispense Refill    valsartan (DIOVAN) 80 MG tablet Take 1 tablet by mouth daily 30 tablet 5    methylPREDNISolone (MEDROL, JACQUES,) 4 MG tablet Take by mouth.  1 kit 0    loratadine (CLARITIN) 10 MG tablet Take 1 tablet by mouth daily 30 tablet 0    tiotropium (SPIRIVA RESPIMAT) 1.25 MCG/ACT AERS inhaler Inhale 2 puffs into the lungs daily 1 each 0    albuterol-ipratropium (COMBIVENT RESPIMAT)  MCG/ACT AERS inhaler Inhale 1 puff into the lungs every 6 hours 1 each 1    furosemide (LASIX) 20 MG tablet TAKE 1 TABLET BY MOUTH EVERY DAY 30 tablet 2    D3-50 1.25 MG (60783 UT) CAPS TAKE 1 CAPSULE BY MOUTH ONE TIME PER WEEK 12 capsule 0    meloxicam (MOBIC) 15 MG tablet TAKE 1 TABLET BY MOUTH EVERY DAY 90 tablet 3    DULoxetine (CYMBALTA) 60 MG extended release capsule Take 1 capsule by mouth daily 90 capsule 3    metoprolol tartrate (LOPRESSOR) 50 MG tablet Take 1 tablet by mouth 2 times daily 180 tablet 3    ARIPiprazole (ABILIFY) 5 MG tablet Take 1 tablet by mouth daily 90 tablet 3    atorvastatin (LIPITOR) 40 MG tablet TAKE 1 TABLET BY MOUTH EVERY DAY 90 tablet 3    meclizine (ANTIVERT) 25 MG tablet TAKE 1 TABLET BY MOUTH EVERY 8 HOURS AS NEEDED      naproxen (NAPROSYN) 500 MG tablet TAKE 1 TABLET BY MOUTH EVERY 12 HOURS      diclofenac sodium (VOLTAREN) 1 % GEL APPLY TO AFFECTED AREA THREE TIMES PER DAY       No current facility-administered medications for this visit. No Known Allergies    Social History     Socioeconomic History    Marital status: Legally      Spouse name: None    Number of children: None    Years of education: None    Highest education level: None   Occupational History    None   Tobacco Use    Smoking status: Never Smoker    Smokeless tobacco: Never Used   Vaping Use    Vaping Use: Never used   Substance and Sexual Activity    Alcohol use: No    Drug use: No    Sexual activity: None   Other Topics Concern    None   Social History Narrative    None     Social Determinants of Health     Financial Resource Strain:     Difficulty of Paying Living Expenses: Not on file   Food Insecurity:     Worried About Running Out of Food in the Last Year: Not on file    Gurinder of Food in the Last Year: Not on file   Transportation Needs:     Lack of Transportation (Medical): Not on file    Lack of Transportation (Non-Medical):  Not on file   Physical Activity:     Days of Exercise per Week: Not on file    Minutes of Exercise per Session: Not on file   Stress:  Feeling of Stress : Not on file   Social Connections:     Frequency of Communication with Friends and Family: Not on file    Frequency of Social Gatherings with Friends and Family: Not on file    Attends Synagogue Services: Not on file    Active Member of Clubs or Organizations: Not on file    Attends Club or Organization Meetings: Not on file    Marital Status: Not on file   Intimate Partner Violence:     Fear of Current or Ex-Partner: Not on file    Emotionally Abused: Not on file    Physically Abused: Not on file    Sexually Abused: Not on file   Housing Stability:     Unable to Pay for Housing in the Last Year: Not on file    Number of [place] in the Last Year: Not on file    Unstable Housing in the Last Year: Not on file       Family History   Problem Relation Age of Onset    Colon Cancer Father     Cancer Sister     Heart Disease Brother     Arthritis Brother     Hypertension Mother     Arthritis Mother     Heart Disease Mother          Review of Systems   Constitutional: Negative. HENT: Positive for congestion, postnasal drip, rhinorrhea and sinus pressure. Negative for ear pain, facial swelling, hearing loss, nosebleeds, sore throat, tinnitus and trouble swallowing. Eyes: Negative. Negative for photophobia and visual disturbance. Respiratory: Negative. Negative for apnea, cough, chest tightness, shortness of breath and wheezing. Cardiovascular: Negative for chest pain, palpitations and leg swelling. Gastrointestinal: Negative for abdominal pain, blood in stool, diarrhea, nausea and vomiting. Genitourinary: Negative for difficulty urinating, frequency and urgency. Musculoskeletal: Negative for arthralgias, back pain, joint swelling and myalgias. Skin: Negative for color change and rash. Allergic/Immunologic: Negative. Neurological: Negative for syncope, weakness, light-headedness and headaches. Hematological: Negative.     Psychiatric/Behavioral: Negative for agitation, behavioral problems, confusion and self-injury. The patient is not nervous/anxious. All other systems reviewed and are negative. Physical Exam  Vitals and nursing note reviewed. Constitutional:       General: She is not in acute distress. Appearance: She is well-developed. HENT:      Head: Normocephalic and atraumatic. Nose: Congestion and rhinorrhea present. Eyes:      Conjunctiva/sclera: Conjunctivae normal.      Pupils: Pupils are equal, round, and reactive to light. Neck:      Thyroid: No thyromegaly. Vascular: No JVD. Cardiovascular:      Rate and Rhythm: Normal rate and regular rhythm. Heart sounds: Normal heart sounds. No murmur heard. No friction rub. No gallop. Pulmonary:      Effort: Pulmonary effort is normal. No respiratory distress. Breath sounds: Normal breath sounds. No wheezing. Abdominal:      General: Bowel sounds are normal. There is no distension. Palpations: Abdomen is soft. Tenderness: There is no abdominal tenderness. There is no guarding or rebound. Musculoskeletal:         General: Normal range of motion. Cervical back: Normal range of motion and neck supple. Lymphadenopathy:      Cervical: No cervical adenopathy. Skin:     General: Skin is warm and dry. Findings: No erythema or rash. Neurological:      Mental Status: She is alert and oriented to person, place, and time. Cranial Nerves: No cranial nerve deficit. Motor: No abnormal muscle tone. Coordination: Coordination normal.      Deep Tendon Reflexes: Reflexes are normal and symmetric. Psychiatric:         Behavior: Behavior normal.         Judgment: Judgment normal.                               ASSESSMENT/PLAN:    Sheldon Renteria was seen today for hypertension, blood pressure check, cough and congestion. Diagnoses and all orders for this visit:    Uncontrolled hypertension  -     valsartan (DIOVAN) 80 MG tablet;  Take 1 tablet by mouth daily    Seasonal allergic rhinitis due to other allergic trigger  -     methylPREDNISolone (MEDROL, JACQUES,) 4 MG tablet; Take by mouth. -     loratadine (CLARITIN) 10 MG tablet;  Take 1 tablet by mouth daily            Beka Johnston DO    5/11/2022  1:35 PM

## 2022-05-18 ENCOUNTER — TELEPHONE (OUTPATIENT)
Dept: PRIMARY CARE CLINIC | Age: 57
End: 2022-05-18

## 2022-05-18 RX ORDER — AMOXICILLIN 875 MG/1
875 TABLET, COATED ORAL 2 TIMES DAILY
Qty: 20 TABLET | Refills: 0 | Status: SHIPPED | OUTPATIENT
Start: 2022-05-18 | End: 2022-05-28

## 2022-05-18 NOTE — TELEPHONE ENCOUNTER
The pt was here to see you 5/11 for some cough and congestion, she is calling because she is not feeling any better and is asking if an antibiotic can be sent over to the pharmacy for her

## 2022-05-25 ENCOUNTER — TELEPHONE (OUTPATIENT)
Dept: PRIMARY CARE CLINIC | Age: 57
End: 2022-05-25

## 2022-05-25 DIAGNOSIS — R05.9 COUGH: Primary | ICD-10-CM

## 2022-05-25 NOTE — TELEPHONE ENCOUNTER
The pt was here to see you 5/11 for cough and congestion and was given a script for methylPREDNISolone (MEDROL, JACQUES,) 4 MG tablet and loratadine (CLARITIN) 10 MG tablet she then called in 5/18 to let you know that she was still not feeling better and so you sent over a script for amoxicillin (AMOXIL) 875 MG tablet.  She is calling today because she is still not feeling better and she is coughing up yellow phlem, she is asking if you are wanting to do any kind of testing on her as far as chest scans

## 2022-05-27 ENCOUNTER — TELEPHONE (OUTPATIENT)
Dept: PRIMARY CARE CLINIC | Age: 57
End: 2022-05-27

## 2022-05-27 NOTE — TELEPHONE ENCOUNTER
Patient asking for results of her xray. Scanned under media. Also asking if you could prescribe a pill for coughing, does not like the syrup.

## 2022-05-28 DIAGNOSIS — J30.89 SEASONAL ALLERGIC RHINITIS DUE TO OTHER ALLERGIC TRIGGER: ICD-10-CM

## 2022-05-28 DIAGNOSIS — M79.89 LEG SWELLING: ICD-10-CM

## 2022-05-28 DIAGNOSIS — R06.02 SOB (SHORTNESS OF BREATH): ICD-10-CM

## 2022-05-28 RX ORDER — BENZONATATE 100 MG/1
100 CAPSULE ORAL 3 TIMES DAILY PRN
Qty: 30 CAPSULE | Refills: 0 | Status: SHIPPED | OUTPATIENT
Start: 2022-05-28 | End: 2022-06-04

## 2022-05-31 ENCOUNTER — OFFICE VISIT (OUTPATIENT)
Dept: PRIMARY CARE CLINIC | Age: 57
End: 2022-05-31
Payer: COMMERCIAL

## 2022-05-31 VITALS
HEIGHT: 64 IN | SYSTOLIC BLOOD PRESSURE: 140 MMHG | TEMPERATURE: 97.9 F | DIASTOLIC BLOOD PRESSURE: 75 MMHG | HEART RATE: 85 BPM | BODY MASS INDEX: 42.17 KG/M2 | RESPIRATION RATE: 18 BRPM | WEIGHT: 247 LBS | OXYGEN SATURATION: 98 %

## 2022-05-31 DIAGNOSIS — S81.802A WOUND OF LEFT LOWER EXTREMITY, INITIAL ENCOUNTER: ICD-10-CM

## 2022-05-31 DIAGNOSIS — L03.116 CELLULITIS OF LEFT LEG: Primary | ICD-10-CM

## 2022-05-31 PROCEDURE — G8417 CALC BMI ABV UP PARAM F/U: HCPCS | Performed by: FAMILY MEDICINE

## 2022-05-31 PROCEDURE — 3017F COLORECTAL CA SCREEN DOC REV: CPT | Performed by: FAMILY MEDICINE

## 2022-05-31 PROCEDURE — 1036F TOBACCO NON-USER: CPT | Performed by: FAMILY MEDICINE

## 2022-05-31 PROCEDURE — G8427 DOCREV CUR MEDS BY ELIG CLIN: HCPCS | Performed by: FAMILY MEDICINE

## 2022-05-31 PROCEDURE — 99213 OFFICE O/P EST LOW 20 MIN: CPT | Performed by: FAMILY MEDICINE

## 2022-05-31 RX ORDER — METHOCARBAMOL 750 MG/1
TABLET ORAL
Qty: 12 CAPSULE | Refills: 0 | Status: SHIPPED
Start: 2022-05-31 | End: 2022-10-25 | Stop reason: SDUPTHER

## 2022-05-31 RX ORDER — LORATADINE 10 MG/1
TABLET ORAL
Qty: 30 TABLET | Refills: 0 | Status: SHIPPED
Start: 2022-05-31 | End: 2022-07-05

## 2022-05-31 RX ORDER — DOXYCYCLINE HYCLATE 100 MG
100 TABLET ORAL 2 TIMES DAILY
Qty: 20 TABLET | Refills: 0 | Status: SHIPPED | OUTPATIENT
Start: 2022-05-31 | End: 2022-06-10

## 2022-05-31 RX ORDER — FUROSEMIDE 20 MG/1
TABLET ORAL
Qty: 90 TABLET | Refills: 0 | Status: SHIPPED
Start: 2022-05-31 | End: 2022-07-14

## 2022-05-31 ASSESSMENT — PATIENT HEALTH QUESTIONNAIRE - PHQ9
SUM OF ALL RESPONSES TO PHQ QUESTIONS 1-9: 0
1. LITTLE INTEREST OR PLEASURE IN DOING THINGS: 0
3. TROUBLE FALLING OR STAYING ASLEEP: 0
7. TROUBLE CONCENTRATING ON THINGS, SUCH AS READING THE NEWSPAPER OR WATCHING TELEVISION: 0
2. FEELING DOWN, DEPRESSED OR HOPELESS: 0
9. THOUGHTS THAT YOU WOULD BE BETTER OFF DEAD, OR OF HURTING YOURSELF: 0
SUM OF ALL RESPONSES TO PHQ QUESTIONS 1-9: 0
5. POOR APPETITE OR OVEREATING: 0
10. IF YOU CHECKED OFF ANY PROBLEMS, HOW DIFFICULT HAVE THESE PROBLEMS MADE IT FOR YOU TO DO YOUR WORK, TAKE CARE OF THINGS AT HOME, OR GET ALONG WITH OTHER PEOPLE: 0
SUM OF ALL RESPONSES TO PHQ QUESTIONS 1-9: 0
6. FEELING BAD ABOUT YOURSELF - OR THAT YOU ARE A FAILURE OR HAVE LET YOURSELF OR YOUR FAMILY DOWN: 0
SUM OF ALL RESPONSES TO PHQ QUESTIONS 1-9: 0
8. MOVING OR SPEAKING SO SLOWLY THAT OTHER PEOPLE COULD HAVE NOTICED. OR THE OPPOSITE, BEING SO FIGETY OR RESTLESS THAT YOU HAVE BEEN MOVING AROUND A LOT MORE THAN USUAL: 0
4. FEELING TIRED OR HAVING LITTLE ENERGY: 0
SUM OF ALL RESPONSES TO PHQ9 QUESTIONS 1 & 2: 0

## 2022-05-31 ASSESSMENT — ENCOUNTER SYMPTOMS
BACK PAIN: 0
VOMITING: 0
FACIAL SWELLING: 0
PHOTOPHOBIA: 0
APNEA: 0
DIARRHEA: 0
CHEST TIGHTNESS: 0
COUGH: 0
SHORTNESS OF BREATH: 0
WHEEZING: 0
SORE THROAT: 0
SINUS PRESSURE: 0
BLOOD IN STOOL: 0
COLOR CHANGE: 0
ABDOMINAL PAIN: 0
NAUSEA: 0
ALLERGIC/IMMUNOLOGIC NEGATIVE: 1

## 2022-05-31 NOTE — PROGRESS NOTES
Chief Complaint:   Chief Complaint   Patient presents with    Cellulitis     left leg. Rash  This is a new problem. The current episode started in the past 7 days. The affected locations include the left lower leg. Pertinent negatives include no congestion, cough, diarrhea, shortness of breath, sore throat or vomiting. Past treatments include nothing. Patient Active Problem List   Diagnosis    Palpitations    Atypical chest pain    Atrial septal aneurysm    Type 2 diabetes mellitus without complication, without long-term current use of insulin (HCC)    Hyperlipidemia    Essential hypertension       Past Medical History:   Diagnosis Date    Atrial septal aneurysm     Gall bladder disease     Herniated disc     Hyperlipidemia     Hypertension     Infectious lymphangitis of deep lymphatics     Kidney stone        Past Surgical History:   Procedure Laterality Date     SECTION      WISDOM TOOTH EXTRACTION         Current Outpatient Medications   Medication Sig Dispense Refill    doxycycline hyclate (VIBRA-TABS) 100 MG tablet Take 1 tablet by mouth 2 times daily for 10 days 20 tablet 0    mupirocin (BACTROBAN) 2 % ointment Apply topically 3 times daily. 30 g 0    furosemide (LASIX) 20 MG tablet TAKE 1 TABLET BY MOUTH EVERY DAY 90 tablet 0    D3-50 1.25 MG (06216 UT) CAPS TAKE 1 CAPSULE BY MOUTH ONE TIME PER WEEK 12 capsule 0    loratadine (CLARITIN) 10 MG tablet TAKE 1 TABLET BY MOUTH EVERY DAY 30 tablet 0    benzonatate (TESSALON) 100 MG capsule Take 1 capsule by mouth 3 times daily as needed for Cough 30 capsule 0    valsartan (DIOVAN) 80 MG tablet Take 1 tablet by mouth daily 30 tablet 5    methylPREDNISolone (MEDROL, JACQUES,) 4 MG tablet Take by mouth.  1 kit 0    tiotropium (SPIRIVA RESPIMAT) 1.25 MCG/ACT AERS inhaler Inhale 2 puffs into the lungs daily 1 each 0    albuterol-ipratropium (COMBIVENT RESPIMAT)  MCG/ACT AERS inhaler Inhale 1 puff into the lungs every 6 hours 1 each 1    meloxicam (MOBIC) 15 MG tablet TAKE 1 TABLET BY MOUTH EVERY DAY 90 tablet 3    DULoxetine (CYMBALTA) 60 MG extended release capsule Take 1 capsule by mouth daily 90 capsule 3    metoprolol tartrate (LOPRESSOR) 50 MG tablet Take 1 tablet by mouth 2 times daily 180 tablet 3    ARIPiprazole (ABILIFY) 5 MG tablet Take 1 tablet by mouth daily 90 tablet 3    atorvastatin (LIPITOR) 40 MG tablet TAKE 1 TABLET BY MOUTH EVERY DAY 90 tablet 3    meclizine (ANTIVERT) 25 MG tablet TAKE 1 TABLET BY MOUTH EVERY 8 HOURS AS NEEDED      naproxen (NAPROSYN) 500 MG tablet TAKE 1 TABLET BY MOUTH EVERY 12 HOURS      diclofenac sodium (VOLTAREN) 1 % GEL APPLY TO AFFECTED AREA THREE TIMES PER DAY       No current facility-administered medications for this visit. No Known Allergies    Social History     Socioeconomic History    Marital status: Legally      Spouse name: None    Number of children: None    Years of education: None    Highest education level: None   Occupational History    None   Tobacco Use    Smoking status: Never Smoker    Smokeless tobacco: Never Used   Vaping Use    Vaping Use: Never used   Substance and Sexual Activity    Alcohol use: No    Drug use: No    Sexual activity: None   Other Topics Concern    None   Social History Narrative    None     Social Determinants of Health     Financial Resource Strain:     Difficulty of Paying Living Expenses: Not on file   Food Insecurity:     Worried About Running Out of Food in the Last Year: Not on file    Gurinder of Food in the Last Year: Not on file   Transportation Needs:     Lack of Transportation (Medical): Not on file    Lack of Transportation (Non-Medical):  Not on file   Physical Activity:     Days of Exercise per Week: Not on file    Minutes of Exercise per Session: Not on file   Stress:     Feeling of Stress : Not on file   Social Connections:     Frequency of Communication with Friends and Family: Not on file    Frequency of Social Gatherings with Friends and Family: Not on file    Attends Sikh Services: Not on file    Active Member of Clubs or Organizations: Not on file    Attends Club or Organization Meetings: Not on file    Marital Status: Not on file   Intimate Partner Violence:     Fear of Current or Ex-Partner: Not on file    Emotionally Abused: Not on file    Physically Abused: Not on file    Sexually Abused: Not on file   Housing Stability:     Unable to Pay for Housing in the Last Year: Not on file    Number of Places Lived in the Last Year: Not on file    Unstable Housing in the Last Year: Not on file       Family History   Problem Relation Age of Onset    Colon Cancer Father     Cancer Sister     Heart Disease Brother     Arthritis Brother     Hypertension Mother     Arthritis Mother     Heart Disease Mother          Review of Systems   Constitutional: Negative. HENT: Negative for congestion, facial swelling, hearing loss, nosebleeds, sinus pressure and sore throat. Eyes: Negative for photophobia and visual disturbance. Respiratory: Negative for apnea, cough, chest tightness, shortness of breath and wheezing. Cardiovascular: Negative for chest pain, palpitations and leg swelling. Gastrointestinal: Negative for abdominal pain, blood in stool, diarrhea, nausea and vomiting. Genitourinary: Negative for difficulty urinating, frequency and urgency. Musculoskeletal: Negative for arthralgias, back pain, joint swelling and myalgias. Skin: Positive for rash and wound. Negative for color change. Allergic/Immunologic: Negative. Neurological: Negative for syncope, weakness, light-headedness and headaches. Hematological: Negative. Psychiatric/Behavioral: Negative for agitation, behavioral problems, confusion and self-injury. The patient is not nervous/anxious. All other systems reviewed and are negative. Physical Exam  Vitals and nursing note reviewed. Constitutional:       General: She is not in acute distress. Appearance: She is well-developed. HENT:      Head: Normocephalic and atraumatic. Nose: Nose normal.   Eyes:      Conjunctiva/sclera: Conjunctivae normal.      Pupils: Pupils are equal, round, and reactive to light. Neck:      Thyroid: No thyromegaly. Vascular: No JVD. Cardiovascular:      Rate and Rhythm: Normal rate and regular rhythm. Heart sounds: Normal heart sounds. No murmur heard. No friction rub. No gallop. Pulmonary:      Effort: Pulmonary effort is normal. No respiratory distress. Breath sounds: Normal breath sounds. No wheezing. Abdominal:      General: Bowel sounds are normal. There is no distension. Palpations: Abdomen is soft. Tenderness: There is no abdominal tenderness. There is no guarding or rebound. Musculoskeletal:         General: Normal range of motion. Cervical back: Normal range of motion and neck supple. Lymphadenopathy:      Cervical: No cervical adenopathy. Skin:     General: Skin is warm and dry. Findings: Erythema, rash and wound present. Neurological:      Mental Status: She is alert and oriented to person, place, and time. Cranial Nerves: No cranial nerve deficit. Motor: No abnormal muscle tone. Coordination: Coordination normal.      Deep Tendon Reflexes: Reflexes are normal and symmetric. Psychiatric:         Behavior: Behavior normal.         Judgment: Judgment normal.                               ASSESSMENT/PLAN:    Denae Bledsoe was seen today for cellulitis. Diagnoses and all orders for this visit:    Cellulitis of left leg  -     doxycycline hyclate (VIBRA-TABS) 100 MG tablet; Take 1 tablet by mouth 2 times daily for 10 days  -     mupirocin (BACTROBAN) 2 % ointment; Apply topically 3 times daily. Wound of left lower extremity, initial encounter  -     mupirocin (BACTROBAN) 2 % ointment;  Apply topically 3 times daily.             Justo Bullock DO    5/31/2022  10:37 AM

## 2022-06-05 DIAGNOSIS — I10 UNCONTROLLED HYPERTENSION: ICD-10-CM

## 2022-06-06 RX ORDER — VALSARTAN 80 MG/1
TABLET ORAL
Qty: 30 TABLET | Refills: 5 | Status: SHIPPED
Start: 2022-06-06 | End: 2022-11-03

## 2022-06-21 ENCOUNTER — OFFICE VISIT (OUTPATIENT)
Dept: PRIMARY CARE CLINIC | Age: 57
End: 2022-06-21
Payer: COMMERCIAL

## 2022-06-21 VITALS
SYSTOLIC BLOOD PRESSURE: 122 MMHG | HEART RATE: 73 BPM | WEIGHT: 248 LBS | OXYGEN SATURATION: 96 % | TEMPERATURE: 97.9 F | RESPIRATION RATE: 18 BRPM | DIASTOLIC BLOOD PRESSURE: 76 MMHG | BODY MASS INDEX: 42.34 KG/M2 | HEIGHT: 64 IN

## 2022-06-21 DIAGNOSIS — E11.9 TYPE 2 DIABETES MELLITUS WITHOUT COMPLICATION, WITHOUT LONG-TERM CURRENT USE OF INSULIN (HCC): ICD-10-CM

## 2022-06-21 DIAGNOSIS — L03.116 CELLULITIS OF LEFT LEG: Primary | ICD-10-CM

## 2022-06-21 DIAGNOSIS — E78.5 HYPERLIPIDEMIA, UNSPECIFIED HYPERLIPIDEMIA TYPE: ICD-10-CM

## 2022-06-21 DIAGNOSIS — N39.3 STRESS INCONTINENCE: ICD-10-CM

## 2022-06-21 DIAGNOSIS — E55.9 VITAMIN D DEFICIENCY: ICD-10-CM

## 2022-06-21 DIAGNOSIS — I10 ESSENTIAL HYPERTENSION: ICD-10-CM

## 2022-06-21 DIAGNOSIS — N39.41 URGE INCONTINENCE: ICD-10-CM

## 2022-06-21 PROCEDURE — 99214 OFFICE O/P EST MOD 30 MIN: CPT | Performed by: FAMILY MEDICINE

## 2022-06-21 PROCEDURE — 3046F HEMOGLOBIN A1C LEVEL >9.0%: CPT | Performed by: FAMILY MEDICINE

## 2022-06-21 PROCEDURE — G8427 DOCREV CUR MEDS BY ELIG CLIN: HCPCS | Performed by: FAMILY MEDICINE

## 2022-06-21 PROCEDURE — 3017F COLORECTAL CA SCREEN DOC REV: CPT | Performed by: FAMILY MEDICINE

## 2022-06-21 PROCEDURE — G8417 CALC BMI ABV UP PARAM F/U: HCPCS | Performed by: FAMILY MEDICINE

## 2022-06-21 PROCEDURE — 2022F DILAT RTA XM EVC RTNOPTHY: CPT | Performed by: FAMILY MEDICINE

## 2022-06-21 PROCEDURE — 1036F TOBACCO NON-USER: CPT | Performed by: FAMILY MEDICINE

## 2022-06-21 ASSESSMENT — ENCOUNTER SYMPTOMS
VOMITING: 0
FACIAL SWELLING: 0
CHEST TIGHTNESS: 0
ABDOMINAL PAIN: 0
NAUSEA: 0
WHEEZING: 0
APNEA: 0
SORE THROAT: 0
COUGH: 0
BACK PAIN: 0
SHORTNESS OF BREATH: 0
SINUS PRESSURE: 0
ALLERGIC/IMMUNOLOGIC NEGATIVE: 1
DIARRHEA: 0
COLOR CHANGE: 0
BLOOD IN STOOL: 0
PHOTOPHOBIA: 0

## 2022-06-21 NOTE — PROGRESS NOTES
Chief Complaint:   Chief Complaint   Patient presents with    Follow-up     left leg     Cellulitis    Blood Work       Rash  This is a new problem. The current episode started 1 to 4 weeks ago. The affected locations include the left lower leg. The rash is characterized by redness. Pertinent negatives include no congestion, cough, diarrhea, shortness of breath, sore throat or vomiting. Past treatments include antibiotics. The treatment provided significant relief. Hyperlipidemia  This is a chronic problem. The current episode started more than 1 year ago. The problem is controlled. Recent lipid tests were reviewed and are normal. Pertinent negatives include no chest pain, myalgias or shortness of breath. Current antihyperlipidemic treatment includes statins. The current treatment provides significant improvement of lipids. There are no compliance problems. Hypertension  This is a chronic problem. The current episode started more than 1 year ago. The problem has been resolved since onset. The problem is controlled. Pertinent negatives include no chest pain, headaches, palpitations or shortness of breath. Past treatments include angiotensin blockers, diuretics and beta blockers. The current treatment provides significant improvement. There are no compliance problems.         Patient Active Problem List   Diagnosis    Palpitations    Atypical chest pain    Atrial septal aneurysm    Type 2 diabetes mellitus without complication, without long-term current use of insulin ([facility] [address]    Hyperlipidemia    Essential hypertension       Past Medical History:   Diagnosis Date    Atrial septal aneurysm     Gall bladder disease     Herniated disc     Hyperlipidemia     Hypertension     Infectious lymphangitis of deep lymphatics     Kidney stone        Past Surgical History:   Procedure Laterality Date     SECTION      WISDOM TOOTH EXTRACTION         Current Outpatient Medications   Medication Sig Dispense Refill    valsartan (DIOVAN) 80 mg tablet TAKE 1 TABLET BY MOUTH EVERY DAY 30 tablet 5    furosemide (LASIX) 20 MG tablet TAKE 1 TABLET BY MOUTH EVERY DAY 90 tablet 0    D3-50 1.25 MG (00741 UT) CAPS TAKE 1 CAPSULE BY MOUTH ONE TIME PER WEEK 12 capsule 0    loratadine (CLARITIN) 10 MG tablet TAKE 1 TABLET BY MOUTH EVERY DAY 30 tablet 0    methylPREDNISolone (MEDROL, JACQUES,) 4 MG tablet Take by mouth. 1 kit 0    tiotropium (SPIRIVA RESPIMAT) 1.25 MCG/ACT AERS inhaler Inhale 2 puffs into the lungs daily 1 each 0    albuterol-ipratropium (COMBIVENT RESPIMAT)  MCG/ACT AERS inhaler Inhale 1 puff into the lungs every 6 hours 1 each 1    meloxicam (MOBIC) 15 MG tablet TAKE 1 TABLET BY MOUTH EVERY DAY 90 tablet 3    DULoxetine (CYMBALTA) 60 MG extended release capsule Take 1 capsule by mouth daily 90 capsule 3    metoprolol tartrate (LOPRESSOR) 50 MG tablet Take 1 tablet by mouth 2 times daily 180 tablet 3    ARIPiprazole (ABILIFY) 5 MG tablet Take 1 tablet by mouth daily 90 tablet 3    atorvastatin (LIPITOR) 40 MG tablet TAKE 1 TABLET BY MOUTH EVERY DAY 90 tablet 3    meclizine (ANTIVERT) 25 MG tablet TAKE 1 TABLET BY MOUTH EVERY 8 HOURS AS NEEDED      naproxen (NAPROSYN) 500 MG tablet TAKE 1 TABLET BY MOUTH EVERY 12 HOURS      diclofenac sodium (VOLTAREN) 1 % GEL APPLY TO AFFECTED AREA THREE TIMES PER DAY       No current facility-administered medications for this visit.        No Known Allergies    Social History     Socioeconomic History    Marital status: Legally      Spouse name: None    Number of children: None    Years of education: None    Highest education level: None   Occupational History    None   Tobacco Use    Smoking status: Never Smoker    Smokeless tobacco: Never Used   Vaping Use    Vaping Use: Never used   Substance and Sexual Activity    Alcohol use: No    Drug use: No    Sexual activity: None   Other Topics Concern    None   Social History Narrative    None Social Determinants of Health     Financial Resource Strain:     Difficulty of Paying Living Expenses: Not on file   Food Insecurity:     Worried About Running Out of Food in the Last Year: Not on file    Gurinder of Food in the Last Year: Not on file   Transportation Needs:     Lack of Transportation (Medical): Not on file    Lack of Transportation (Non-Medical): Not on file   Physical Activity:     Days of Exercise per Week: Not on file    Minutes of Exercise per Session: Not on file   Stress:     Feeling of Stress : Not on file   Social Connections:     Frequency of Communication with Friends and Family: Not on file    Frequency of Social Gatherings with Friends and Family: Not on file    Attends Roman Catholic Services: Not on file    Active Member of Clubs or Organizations: Not on file    Attends Club or Organization Meetings: Not on file    Marital Status: Not on file   Intimate Partner Violence:     Fear of Current or Ex-Partner: Not on file    Emotionally Abused: Not on file    Physically Abused: Not on file    Sexually Abused: Not on file   Housing Stability:     Unable to Pay for Housing in the Last Year: Not on file    Number of [place] in the Last Year: Not on file    Unstable Housing in the Last Year: Not on file       Family History   Problem Relation Age of Onset    Colon Cancer Father     Cancer Sister     Heart Disease Brother     Arthritis Brother     Hypertension Mother     Arthritis Mother     Heart Disease Mother          Review of Systems   Constitutional: Negative. HENT: Negative for congestion, facial swelling, hearing loss, nosebleeds, sinus pressure and sore throat. Eyes: Negative for photophobia and visual disturbance. Respiratory: Negative for apnea, cough, chest tightness, shortness of breath and wheezing. Cardiovascular: Negative for chest pain, palpitations and leg swelling.    Gastrointestinal: Negative for abdominal pain, blood in stool, diarrhea, nausea and vomiting. Genitourinary: Positive for frequency. Negative for difficulty urinating and urgency. Musculoskeletal: Negative for arthralgias, back pain, joint swelling and myalgias. Skin: Positive for rash. Negative for color change. Allergic/Immunologic: Negative. Neurological: Negative for syncope, weakness, light-headedness and headaches. Hematological: Negative. Psychiatric/Behavioral: Negative for agitation, behavioral problems, confusion and self-injury. The patient is not nervous/anxious. All other systems reviewed and are negative. Physical Exam  Vitals and nursing note reviewed. Constitutional:       General: She is not in acute distress. Appearance: She is well-developed. HENT:      Head: Normocephalic and atraumatic. Nose: Nose normal.   Eyes:      Conjunctiva/sclera: Conjunctivae normal.      Pupils: Pupils are equal, round, and reactive to light. Neck:      Thyroid: No thyromegaly. Vascular: No JVD. Cardiovascular:      Rate and Rhythm: Normal rate and regular rhythm. Heart sounds: Normal heart sounds. No murmur heard. No friction rub. No gallop. Pulmonary:      Effort: Pulmonary effort is normal. No respiratory distress. Breath sounds: Normal breath sounds. No wheezing. Abdominal:      General: Bowel sounds are normal. There is no distension. Palpations: Abdomen is soft. Tenderness: There is no abdominal tenderness. There is no guarding or rebound. Musculoskeletal:         General: Normal range of motion. Cervical back: Normal range of motion and neck supple. Lymphadenopathy:      Cervical: No cervical adenopathy. Skin:     General: Skin is warm and dry. Findings: No erythema or rash. Neurological:      Mental Status: She is alert and oriented to person, place, and time. Cranial Nerves: No cranial nerve deficit. Motor: No abnormal muscle tone.       Coordination: Coordination normal. Deep Tendon Reflexes: Reflexes are normal and symmetric. Psychiatric:         Behavior: Behavior normal.         Judgment: Judgment normal.                               ASSESSMENT/PLAN:    Catarino Cramer was seen today for follow-up, cellulitis and blood work. Diagnoses and all orders for this visit:    Cellulitis of left leg  -     CBC with Auto Differential; Future  -     Comprehensive Metabolic Panel; Future  -     Lipid Panel; Future  -     Hemoglobin A1C; Future  -     TSH; Future  -     Vitamin D 25 Hydroxy; Future    Type 2 diabetes mellitus without complication, without long-term current use of insulin (HCC)  -     CBC with Auto Differential; Future  -     Comprehensive Metabolic Panel; Future  -     Lipid Panel; Future  -     Hemoglobin A1C; Future  -     TSH; Future  -     Vitamin D 25 Hydroxy; Future    Essential hypertension  -     CBC with Auto Differential; Future  -     Comprehensive Metabolic Panel; Future  -     Lipid Panel; Future  -     Hemoglobin A1C; Future  -     TSH; Future  -     Vitamin D 25 Hydroxy; Future    Hyperlipidemia, unspecified hyperlipidemia type  -     CBC with Auto Differential; Future  -     Comprehensive Metabolic Panel; Future  -     Lipid Panel; Future  -     Hemoglobin A1C; Future  -     TSH; Future  -     Vitamin D 25 Hydroxy; Future    Vitamin D deficiency  -     CBC with Auto Differential; Future  -     Comprehensive Metabolic Panel; Future  -     Lipid Panel; Future  -     Hemoglobin A1C; Future  -     TSH; Future  -     Vitamin D 25 Hydroxy;  Future    Urge incontinence  -     Karl Sahu MD, OB/GYN, [place] ([facility])    Stress incontinence  -     Karl Sahu MD, OB/GYN, [place] ([facility])            Patricia Diggs DO    6/21/2022  4:19 PM

## 2022-07-05 DIAGNOSIS — J30.89 SEASONAL ALLERGIC RHINITIS DUE TO OTHER ALLERGIC TRIGGER: ICD-10-CM

## 2022-07-05 RX ORDER — LORATADINE 10 MG/1
TABLET ORAL
Qty: 30 TABLET | Refills: 0 | Status: SHIPPED
Start: 2022-07-05 | End: 2022-07-14

## 2022-07-13 DIAGNOSIS — M79.89 LEG SWELLING: ICD-10-CM

## 2022-07-13 DIAGNOSIS — R06.02 SOB (SHORTNESS OF BREATH): ICD-10-CM

## 2022-07-13 DIAGNOSIS — J30.89 SEASONAL ALLERGIC RHINITIS DUE TO OTHER ALLERGIC TRIGGER: ICD-10-CM

## 2022-07-14 RX ORDER — FUROSEMIDE 20 MG/1
TABLET ORAL
Qty: 90 TABLET | Refills: 0 | Status: SHIPPED
Start: 2022-07-14 | End: 2022-11-03

## 2022-07-14 RX ORDER — LORATADINE 10 MG/1
TABLET ORAL
Qty: 30 TABLET | Refills: 0 | Status: SHIPPED | OUTPATIENT
Start: 2022-07-14

## 2022-08-19 ENCOUNTER — TELEPHONE (OUTPATIENT)
Dept: ENT CLINIC | Age: 57
End: 2022-08-19

## 2022-08-19 NOTE — TELEPHONE ENCOUNTER
Called [employer]  to get prior Auth for CT Maxillofacial with contrast. Spoke with Eulalia Perea. Kassieclara Jaclyn states there was no need for a prior Auth and reference number [id number], 8/19/22. After getting prior auth information I faxed Ty the prior auth information and order for CT (wrote a note on fax sheet for pt to be scheduled in October before appointment on 10/20/22) for Ty to schedule pt before 10/20/22.

## 2022-08-25 ENCOUNTER — TELEPHONE (OUTPATIENT)
Dept: ENT CLINIC | Age: 57
End: 2022-08-25

## 2022-08-25 DIAGNOSIS — D17.0 LIPOMA OF FACE: Primary | ICD-10-CM

## 2022-08-25 NOTE — TELEPHONE ENCOUNTER
MA spoke with Vicky Dye from [employer]. States patient is not yet scheduled for CT maxillofacial but they have the order. States it is under radiology tech review at this time. Follow up with Dr. Anya Beyer 10/20/22.     Electronically signed by Virginie Wiseman on 8/25/22 at 8:55 AM EDT

## 2022-08-25 NOTE — TELEPHONE ENCOUNTER
New order pended for [address] [place] [address]    Electronically signed by Ashok Casillas MA on 8/25/22 at 11:05 AM EDT

## 2022-08-26 ENCOUNTER — TELEPHONE (OUTPATIENT)
Dept: ENT CLINIC | Age: 57
End: 2022-08-26

## 2022-08-26 DIAGNOSIS — D17.0 LIPOMA OF FACE: Primary | ICD-10-CM

## 2022-08-26 NOTE — TELEPHONE ENCOUNTER
Wrong order pended for Fide [facility]ong, new order pended.     Electronically signed by Khadijah Beebe MA on 8/26/22 at 10:50 AM EDT

## 2022-08-29 NOTE — TELEPHONE ENCOUNTER
Patient needs to have CT maxillofacial without contrast. Patient states she has issues with contrast.    Electronically signed by Rae Jaime MA on 8/29/22 at 10:59 AM EDT

## 2022-10-12 ENCOUNTER — TELEPHONE (OUTPATIENT)
Dept: ENT CLINIC | Age: 57
End: 2022-10-12

## 2022-10-12 NOTE — TELEPHONE ENCOUNTER
CT scan is ordered without contrast so there is no concern with contrast. MA spoke with patient, patient will call [facility] and try to get rescheduled prior to follow up with Dr. Teddy Mcdaniel 10/20/22. Patient will rescheduled follow up if she can't get imaging prior.     Electronically signed by Dakota Ramirez MA on 10/12/22 at 2:42 PM EDT

## 2022-10-12 NOTE — TELEPHONE ENCOUNTER
Pt returned phone call she would like to speak with the office, she said she was not advised she was scheduled for a CT. She stated she is unable to have a Ct scan w/contrast, due to being allergic.  Pramod Engel can be reached at [phone]

## 2022-10-12 NOTE — TELEPHONE ENCOUNTER
MA called [facility] to get sinus CT report for upcoming appointment. I was informed patient missed her CT appt 10/11/22 at 10:00am. MA LVM for patient to reschedule her appt ASAP, she has her follow up with Dr. Yuko Cummings to review these results 10/21/22, appt will be cancelled if her CT is not done. Phone number given.     Electronically signed by Jeanette Andres MA on 10/12/22 at 11:10 AM EDT

## 2022-10-14 ENCOUNTER — TELEPHONE (OUTPATIENT)
Dept: ENT CLINIC | Age: 57
End: 2022-10-14

## 2022-10-14 NOTE — TELEPHONE ENCOUNTER
Called Ty to see if patient rescheduled her sinus CT but patient did not reschedule. YAAKOV LAW for patient informing her to reschedule CT and that her follow up is still scheduled for 10/20/22.     Electronically signed by Virginie Collazo on 10/14/22 at 3:01 PM EDT

## 2022-10-19 ENCOUNTER — TELEPHONE (OUTPATIENT)
Dept: ENT CLINIC | Age: 57
End: 2022-10-19

## 2022-10-25 RX ORDER — METHOCARBAMOL 750 MG/1
TABLET ORAL
Qty: 12 CAPSULE | Refills: 1 | Status: SHIPPED | OUTPATIENT
Start: 2022-10-25

## 2022-10-25 NOTE — TELEPHONE ENCOUNTER
Patient going to have labs drawn that are in chart. Asking if you could also order urinalysis and Covid antibody test on her also. Feels like she may have a UTI again. Also requesting refill.

## 2022-10-27 ENCOUNTER — TELEPHONE (OUTPATIENT)
Dept: PRIMARY CARE CLINIC | Age: 57
End: 2022-10-27

## 2022-10-27 DIAGNOSIS — Z20.822 EXPOSURE TO COVID-19 VIRUS: ICD-10-CM

## 2022-10-27 DIAGNOSIS — R30.0 DYSURIA: Primary | ICD-10-CM

## 2022-11-03 DIAGNOSIS — M79.89 LEG SWELLING: ICD-10-CM

## 2022-11-03 DIAGNOSIS — I10 UNCONTROLLED HYPERTENSION: ICD-10-CM

## 2022-11-03 DIAGNOSIS — R06.02 SOB (SHORTNESS OF BREATH): ICD-10-CM

## 2022-11-03 RX ORDER — MELOXICAM 15 MG/1
TABLET ORAL
Qty: 90 TABLET | Refills: 3 | Status: SHIPPED | OUTPATIENT
Start: 2022-11-03

## 2022-11-03 RX ORDER — FUROSEMIDE 20 MG/1
TABLET ORAL
Qty: 90 TABLET | Refills: 0 | Status: SHIPPED
Start: 2022-11-03 | End: 2022-11-28

## 2022-11-03 RX ORDER — METOPROLOL TARTRATE 50 MG/1
TABLET, FILM COATED ORAL
Qty: 180 TABLET | Refills: 3 | Status: SHIPPED | OUTPATIENT
Start: 2022-11-03

## 2022-11-03 RX ORDER — VALSARTAN 80 MG/1
TABLET ORAL
Qty: 90 TABLET | Refills: 1 | Status: SHIPPED | OUTPATIENT
Start: 2022-11-03

## 2022-11-03 RX ORDER — ATORVASTATIN CALCIUM 40 MG/1
TABLET, FILM COATED ORAL
Qty: 90 TABLET | Refills: 3 | Status: SHIPPED | OUTPATIENT
Start: 2022-11-03

## 2022-11-03 RX ORDER — DULOXETIN HYDROCHLORIDE 60 MG/1
CAPSULE, DELAYED RELEASE ORAL
Qty: 90 CAPSULE | Refills: 3 | Status: SHIPPED | OUTPATIENT
Start: 2022-11-03

## 2022-11-09 ENCOUNTER — OFFICE VISIT (OUTPATIENT)
Dept: ENT CLINIC | Age: 57
End: 2022-11-09
Payer: COMMERCIAL

## 2022-11-09 VITALS
OXYGEN SATURATION: 99 % | BODY MASS INDEX: 42.34 KG/M2 | DIASTOLIC BLOOD PRESSURE: 98 MMHG | SYSTOLIC BLOOD PRESSURE: 144 MMHG | WEIGHT: 248 LBS | HEIGHT: 64 IN | HEART RATE: 90 BPM

## 2022-11-09 DIAGNOSIS — D17.0 LIPOMA OF FACE: Primary | ICD-10-CM

## 2022-11-09 PROCEDURE — G8427 DOCREV CUR MEDS BY ELIG CLIN: HCPCS | Performed by: OTOLARYNGOLOGY

## 2022-11-09 PROCEDURE — 3074F SYST BP LT 130 MM HG: CPT | Performed by: OTOLARYNGOLOGY

## 2022-11-09 PROCEDURE — G8484 FLU IMMUNIZE NO ADMIN: HCPCS | Performed by: OTOLARYNGOLOGY

## 2022-11-09 PROCEDURE — 1036F TOBACCO NON-USER: CPT | Performed by: OTOLARYNGOLOGY

## 2022-11-09 PROCEDURE — 99213 OFFICE O/P EST LOW 20 MIN: CPT | Performed by: OTOLARYNGOLOGY

## 2022-11-09 PROCEDURE — 3017F COLORECTAL CA SCREEN DOC REV: CPT | Performed by: OTOLARYNGOLOGY

## 2022-11-09 PROCEDURE — 3078F DIAST BP <80 MM HG: CPT | Performed by: OTOLARYNGOLOGY

## 2022-11-09 PROCEDURE — G8417 CALC BMI ABV UP PARAM F/U: HCPCS | Performed by: OTOLARYNGOLOGY

## 2022-11-09 ASSESSMENT — ENCOUNTER SYMPTOMS
VOICE CHANGE: 0
RHINORRHEA: 0
GASTROINTESTINAL NEGATIVE: 1
SINUS PRESSURE: 0
TROUBLE SWALLOWING: 0
WHEEZING: 0
STRIDOR: 0
COUGH: 0
SINUS PAIN: 0
SORE THROAT: 0
COLOR CHANGE: 0
CHOKING: 0

## 2022-11-09 ASSESSMENT — VISUAL ACUITY: OU: 1

## 2022-11-09 NOTE — PROGRESS NOTES
Subjective:      Patient ID:  Marge Slater is a 64 y.o. female. HPI:    Pt returns for review of CT and evaluation of right sided lipoma. There are not changes since last visit. Patient's medications, allergies, past medical, surgical, social and family histories were reviewed and updated as appropriate. Review of Systems   Constitutional:  Negative for activity change, fatigue and fever. HENT:  Negative for congestion, ear discharge, ear pain, hearing loss, nosebleeds, postnasal drip, rhinorrhea, sinus pressure, sinus pain, sore throat, tinnitus, trouble swallowing and voice change. Respiratory:  Negative for cough, choking, wheezing and stridor. Cardiovascular:  Negative for chest pain. Gastrointestinal: Negative. Genitourinary: Negative. Skin:  Negative for color change and rash. Neurological:  Negative for speech difficulty, light-headedness, numbness and headaches. Hematological:  Negative for adenopathy. Psychiatric/Behavioral:  Negative for behavioral problems. Objective:   Physical Exam  Constitutional:       Appearance: Normal appearance. She is normal weight. HENT:      Head: Normocephalic and atraumatic. Right Ear: Tympanic membrane, ear canal and external ear normal. No drainage. Left Ear: Tympanic membrane, ear canal and external ear normal. No drainage. Nose: Nose normal. No nasal deformity or septal deviation. Mouth/Throat:      Mouth: Mucous membranes are moist.   Eyes:      General: Lids are normal. Vision grossly intact. Extraocular Movements: Extraocular movements intact. Conjunctiva/sclera: Conjunctivae normal.      Pupils: Pupils are equal, round, and reactive to light. Cardiovascular:      Rate and Rhythm: Normal rate. Pulmonary:      Effort: Pulmonary effort is normal.   Musculoskeletal:         General: Normal range of motion. Cervical back: Normal range of motion.    Lymphadenopathy: Cervical: No cervical adenopathy. Skin:     Capillary Refill: Capillary refill takes less than 2 seconds. Neurological:      Mental Status: She is alert. Psychiatric:         Mood and Affect: Mood normal.           CT sinuses (my review)    no - shelly cells - bilateral  yes - carolyn bullosa -right moderate  yes - Enlarged inferior turbinates - bilateral   no - Obstructed OMC -bilateral   no - maxillary sinus disease -bilateral,   no - frontal sinus disease- bilateral, mild  Left frontal sinus   Absent -  no   Size - Minimal  Right frontal sinus   Absent -  no   Size - Minimal  no - sphenoid sinus disease - bilateral,mild  no - Deviated nasal septum - bilateral,  mild   no - Septal Spur - bilateral,mild   no - posterior accessory os -bilateral,  mild                     Assessment:       Diagnosis Orders   1. Lipoma of face                   Plan:      Pt seen and examined for right sided facial lipoma. Not growing. Not cosmetically disturbing. Will return to clinic if issues arise or mass grows. Follow up zaira Whitlock Pair  1965    I have discussed the case, including pertinent history and exam findings with the resident. I have seen and examined the patient and the key elements of the encounter have been performed by me. I agree with the assessment, plan and orders as documented by the  resident              Remainder of medical problems as per  resident note. Patient seen and examined. Agree with above exam, assessment and plan.       Electronically signed by Verito Valdes DO on 11/28/22 at 11:55 AM EST

## 2022-11-11 DIAGNOSIS — Z20.822 EXPOSURE TO COVID-19 VIRUS: ICD-10-CM

## 2022-11-11 DIAGNOSIS — R30.0 DYSURIA: ICD-10-CM

## 2022-11-11 DIAGNOSIS — I10 ESSENTIAL HYPERTENSION: ICD-10-CM

## 2022-11-11 DIAGNOSIS — E11.9 TYPE 2 DIABETES MELLITUS WITHOUT COMPLICATION, WITHOUT LONG-TERM CURRENT USE OF INSULIN (HCC): ICD-10-CM

## 2022-11-11 DIAGNOSIS — L03.116 CELLULITIS OF LEFT LEG: ICD-10-CM

## 2022-11-11 DIAGNOSIS — E78.5 HYPERLIPIDEMIA, UNSPECIFIED HYPERLIPIDEMIA TYPE: ICD-10-CM

## 2022-11-11 DIAGNOSIS — E55.9 VITAMIN D DEFICIENCY: ICD-10-CM

## 2022-11-11 LAB
ALBUMIN SERPL-MCNC: 4.3 G/DL (ref 3.5–5.2)
ALP BLD-CCNC: 134 U/L (ref 35–104)
ALT SERPL-CCNC: 15 U/L (ref 0–32)
ANION GAP SERPL CALCULATED.3IONS-SCNC: 13 MMOL/L (ref 7–16)
AST SERPL-CCNC: 14 U/L (ref 0–31)
BASOPHILS ABSOLUTE: 0.03 E9/L (ref 0–0.2)
BASOPHILS RELATIVE PERCENT: 0.4 % (ref 0–2)
BILIRUB SERPL-MCNC: 0.3 MG/DL (ref 0–1.2)
BILIRUBIN URINE: NEGATIVE
BLOOD, URINE: NEGATIVE
BUN BLDV-MCNC: 14 MG/DL (ref 6–20)
CALCIUM SERPL-MCNC: 10 MG/DL (ref 8.6–10.2)
CHLORIDE BLD-SCNC: 101 MMOL/L (ref 98–107)
CHOLESTEROL, TOTAL: 235 MG/DL (ref 0–199)
CLARITY: CLEAR
CO2: 24 MMOL/L (ref 22–29)
COLOR: YELLOW
CREAT SERPL-MCNC: 0.6 MG/DL (ref 0.5–1)
EOSINOPHILS ABSOLUTE: 0.09 E9/L (ref 0.05–0.5)
EOSINOPHILS RELATIVE PERCENT: 1.2 % (ref 0–6)
GFR SERPL CREATININE-BSD FRML MDRD: >60 ML/MIN/1.73
GLUCOSE BLD-MCNC: 101 MG/DL (ref 74–99)
GLUCOSE URINE: NEGATIVE MG/DL
HBA1C MFR BLD: 6.3 % (ref 4–5.6)
HCT VFR BLD CALC: 42.2 % (ref 34–48)
HDLC SERPL-MCNC: 51 MG/DL
HEMOGLOBIN: 13.2 G/DL (ref 11.5–15.5)
IMMATURE GRANULOCYTES #: 0.02 E9/L
IMMATURE GRANULOCYTES %: 0.3 % (ref 0–5)
KETONES, URINE: NEGATIVE MG/DL
LDL CHOLESTEROL CALCULATED: 158 MG/DL (ref 0–99)
LEUKOCYTE ESTERASE, URINE: NEGATIVE
LYMPHOCYTES ABSOLUTE: 1.83 E9/L (ref 1.5–4)
LYMPHOCYTES RELATIVE PERCENT: 24.8 % (ref 20–42)
MCH RBC QN AUTO: 28 PG (ref 26–35)
MCHC RBC AUTO-ENTMCNC: 31.3 % (ref 32–34.5)
MCV RBC AUTO: 89.4 FL (ref 80–99.9)
MONOCYTES ABSOLUTE: 0.35 E9/L (ref 0.1–0.95)
MONOCYTES RELATIVE PERCENT: 4.7 % (ref 2–12)
NEUTROPHILS ABSOLUTE: 5.06 E9/L (ref 1.8–7.3)
NEUTROPHILS RELATIVE PERCENT: 68.6 % (ref 43–80)
NITRITE, URINE: NEGATIVE
PDW BLD-RTO: 14.6 FL (ref 11.5–15)
PH UA: 6 (ref 5–9)
PLATELET # BLD: 375 E9/L (ref 130–450)
PMV BLD AUTO: 10.1 FL (ref 7–12)
POTASSIUM SERPL-SCNC: 4.1 MMOL/L (ref 3.5–5)
PROTEIN UA: NEGATIVE MG/DL
RBC # BLD: 4.72 E12/L (ref 3.5–5.5)
SARS-COV-2 ANTIBODY, TOTAL: REACTIVE
SODIUM BLD-SCNC: 138 MMOL/L (ref 132–146)
SPECIFIC GRAVITY UA: >=1.03 (ref 1–1.03)
TOTAL PROTEIN: 7.5 G/DL (ref 6.4–8.3)
TRIGL SERPL-MCNC: 128 MG/DL (ref 0–149)
TSH SERPL DL<=0.05 MIU/L-ACNC: 2.11 UIU/ML (ref 0.27–4.2)
UROBILINOGEN, URINE: 0.2 E.U./DL
VITAMIN D 25-HYDROXY: 48 NG/ML (ref 30–100)
VLDLC SERPL CALC-MCNC: 26 MG/DL
WBC # BLD: 7.4 E9/L (ref 4.5–11.5)

## 2022-11-13 LAB — URINE CULTURE, ROUTINE: NORMAL

## 2022-11-28 DIAGNOSIS — M79.89 LEG SWELLING: ICD-10-CM

## 2022-11-28 DIAGNOSIS — R06.02 SOB (SHORTNESS OF BREATH): ICD-10-CM

## 2022-11-28 RX ORDER — FUROSEMIDE 20 MG/1
TABLET ORAL
Qty: 90 TABLET | Refills: 0 | Status: SHIPPED | OUTPATIENT
Start: 2022-11-28

## 2022-12-05 ENCOUNTER — OFFICE VISIT (OUTPATIENT)
Dept: FAMILY MEDICINE CLINIC | Age: 57
End: 2022-12-05
Payer: COMMERCIAL

## 2022-12-05 VITALS
WEIGHT: 238 LBS | BODY MASS INDEX: 40.63 KG/M2 | HEIGHT: 64 IN | HEART RATE: 58 BPM | OXYGEN SATURATION: 97 % | SYSTOLIC BLOOD PRESSURE: 128 MMHG | DIASTOLIC BLOOD PRESSURE: 78 MMHG | TEMPERATURE: 97.1 F

## 2022-12-05 DIAGNOSIS — J02.9 SORE THROAT: Primary | ICD-10-CM

## 2022-12-05 DIAGNOSIS — J32.9 SINUSITIS, BACTERIAL: ICD-10-CM

## 2022-12-05 DIAGNOSIS — L40.9 PSORIASIS: ICD-10-CM

## 2022-12-05 DIAGNOSIS — B96.89 SINUSITIS, BACTERIAL: ICD-10-CM

## 2022-12-05 LAB — S PYO AG THROAT QL: NORMAL

## 2022-12-05 PROCEDURE — 1036F TOBACCO NON-USER: CPT | Performed by: FAMILY MEDICINE

## 2022-12-05 PROCEDURE — 3017F COLORECTAL CA SCREEN DOC REV: CPT | Performed by: FAMILY MEDICINE

## 2022-12-05 PROCEDURE — 99213 OFFICE O/P EST LOW 20 MIN: CPT | Performed by: FAMILY MEDICINE

## 2022-12-05 PROCEDURE — 87880 STREP A ASSAY W/OPTIC: CPT | Performed by: FAMILY MEDICINE

## 2022-12-05 PROCEDURE — G8428 CUR MEDS NOT DOCUMENT: HCPCS | Performed by: FAMILY MEDICINE

## 2022-12-05 PROCEDURE — G8417 CALC BMI ABV UP PARAM F/U: HCPCS | Performed by: FAMILY MEDICINE

## 2022-12-05 PROCEDURE — 3074F SYST BP LT 130 MM HG: CPT | Performed by: FAMILY MEDICINE

## 2022-12-05 PROCEDURE — G8484 FLU IMMUNIZE NO ADMIN: HCPCS | Performed by: FAMILY MEDICINE

## 2022-12-05 PROCEDURE — 3078F DIAST BP <80 MM HG: CPT | Performed by: FAMILY MEDICINE

## 2022-12-05 RX ORDER — CEFDINIR 300 MG/1
300 CAPSULE ORAL 2 TIMES DAILY
Qty: 20 CAPSULE | Refills: 0 | Status: SHIPPED | OUTPATIENT
Start: 2022-12-05 | End: 2022-12-15

## 2022-12-05 ASSESSMENT — ENCOUNTER SYMPTOMS
RHINORRHEA: 1
COUGH: 1
EYES NEGATIVE: 1
SORE THROAT: 1
FACIAL SWELLING: 0
TROUBLE SWALLOWING: 0

## 2022-12-05 NOTE — PROGRESS NOTES
Chief Complaint:   Chief Complaint   Patient presents with    Nasal Congestion     Started Thursday, Tylenol little relief     Cough    Pharyngitis       Cough  This is a new problem. Associated symptoms include postnasal drip, a rash, rhinorrhea and a sore throat. Pertinent negatives include no ear pain or fever. Sinusitis  This is a new problem. The current episode started in the past 7 days. The problem is unchanged. Associated symptoms include congestion, coughing and a sore throat. Pertinent negatives include no ear pain. Rash  This is a new problem. The current episode started 1 to 4 weeks ago. The affected locations include the scalp. Associated symptoms include congestion, coughing, rhinorrhea and a sore throat. Pertinent negatives include no fever.      Patient Active Problem List   Diagnosis    Palpitations    Atypical chest pain    Atrial septal aneurysm    Type 2 diabetes mellitus without complication, without long-term current use of insulin (HCC)    Hyperlipidemia    Essential hypertension       Past Medical History:   Diagnosis Date    Atrial septal aneurysm     Gall bladder disease     Herniated disc     Hyperlipidemia     Hypertension     Infectious lymphangitis of deep lymphatics     Kidney stone        Past Surgical History:   Procedure Laterality Date     SECTION      WISDOM TOOTH EXTRACTION         Current Outpatient Medications   Medication Sig Dispense Refill    cefdinir (OMNICEF) 300 MG capsule Take 1 capsule by mouth 2 times daily for 10 days 20 capsule 0    furosemide (LASIX) 20 MG tablet TAKE 1 TABLET BY MOUTH EVERY DAY 90 tablet 0    atorvastatin (LIPITOR) 40 MG tablet TAKE 1 TABLET BY MOUTH EVERY DAY 90 tablet 3    meloxicam (MOBIC) 15 MG tablet TAKE 1 TABLET BY MOUTH EVERY DAY 90 tablet 3    metoprolol tartrate (LOPRESSOR) 50 MG tablet TAKE 1 TABLET BY MOUTH TWICE A  tablet 3    valsartan (DIOVAN) 80 MG tablet TAKE 1 TABLET BY MOUTH EVERY DAY 90 tablet 1    DULoxetine (CYMBALTA) 60 MG extended release capsule TAKE 1 CAPSULE BY MOUTH EVERY DAY 90 capsule 3    D3-50 1.25 MG (23569 UT) CAPS TAKE 1 CAPSULE BY MOUTH ONE TIME PER WEEK 12 capsule 1    loratadine (CLARITIN) 10 MG tablet TAKE 1 TABLET BY MOUTH EVERY DAY 30 tablet 0    tiotropium (SPIRIVA RESPIMAT) 1.25 MCG/ACT AERS inhaler Inhale 2 puffs into the lungs daily 1 each 0    albuterol-ipratropium (COMBIVENT RESPIMAT)  MCG/ACT AERS inhaler Inhale 1 puff into the lungs every 6 hours 1 each 1    ARIPiprazole (ABILIFY) 5 MG tablet Take 1 tablet by mouth daily 90 tablet 3    meclizine (ANTIVERT) 25 MG tablet TAKE 1 TABLET BY MOUTH EVERY 8 HOURS AS NEEDED      naproxen (NAPROSYN) 500 MG tablet TAKE 1 TABLET BY MOUTH EVERY 12 HOURS      diclofenac sodium (VOLTAREN) 1 % GEL APPLY TO AFFECTED AREA THREE TIMES PER DAY       No current facility-administered medications for this visit. No Known Allergies    Social History     Socioeconomic History    Marital status: Legally    Tobacco Use    Smoking status: Never    Smokeless tobacco: Never   Vaping Use    Vaping Use: Never used   Substance and Sexual Activity    Alcohol use: No    Drug use: No       Family History   Problem Relation Age of Onset    Colon Cancer Father     Cancer Sister     Heart Disease Brother     Arthritis Brother     Hypertension Mother     Arthritis Mother     Heart Disease Mother          Review of Systems   Constitutional: Negative. Negative for fever. HENT:  Positive for congestion, postnasal drip, rhinorrhea and sore throat. Negative for ear pain, facial swelling, nosebleeds, tinnitus and trouble swallowing. Eyes: Negative. Respiratory:  Positive for cough. Cardiovascular: Negative. Skin:  Positive for rash. Physical Exam  Vitals and nursing note reviewed. Constitutional:       General: She is not in acute distress. Appearance: She is well-developed. HENT:      Head: Normocephalic and atraumatic.       Nose: Congestion and rhinorrhea present. Right Sinus: Maxillary sinus tenderness present. Left Sinus: Maxillary sinus tenderness present. Mouth/Throat:      Pharynx: Uvula midline. Posterior oropharyngeal erythema present. Eyes:      General:         Right eye: No discharge. Left eye: No discharge. Conjunctiva/sclera: Conjunctivae normal.      Pupils: Pupils are equal, round, and reactive to light. Neck:      Thyroid: No thyromegaly. Vascular: No JVD. Cardiovascular:      Rate and Rhythm: Normal rate and regular rhythm. Heart sounds: Normal heart sounds. No murmur heard. No friction rub. No gallop. Pulmonary:      Effort: Pulmonary effort is normal. No respiratory distress. Breath sounds: Normal breath sounds. No stridor. No wheezing or rales. Chest:      Chest wall: No tenderness. Abdominal:      General: Bowel sounds are normal. There is no distension. Palpations: Abdomen is soft. There is no mass. Tenderness: There is no abdominal tenderness. There is no guarding or rebound. Musculoskeletal:         General: Normal range of motion. Cervical back: Normal range of motion and neck supple. Lymphadenopathy:      Cervical: No cervical adenopathy. Skin:     General: Skin is warm and dry. Coloration: Skin is not pale. Findings: Rash present. No erythema. Neurological:      Mental Status: She is alert and oriented to person, place, and time. Cranial Nerves: No cranial nerve deficit. Motor: No abnormal muscle tone. Coordination: Coordination normal.      Deep Tendon Reflexes: Reflexes are normal and symmetric. Psychiatric:         Behavior: Behavior normal.         Judgment: Judgment normal.                             ASSESSMENT/PLAN:    Albino Cabrera was seen today for nasal congestion, cough and pharyngitis.     Diagnoses and all orders for this visit:    Sore throat  -     POCT rapid strep A    Sinusitis, bacterial  - cefdinir (OMNICEF) 300 MG capsule;  Take 1 capsule by mouth 2 times daily for 10 days    Psoriasis  -     Ewa Orellana DO, [facility] ([facility])          Adam Kirk DO    12/5/2022  3:21 PM

## 2022-12-30 ENCOUNTER — OFFICE VISIT (OUTPATIENT)
Dept: FAMILY MEDICINE CLINIC | Age: 57
End: 2022-12-30
Payer: COMMERCIAL

## 2022-12-30 ENCOUNTER — TELEPHONE (OUTPATIENT)
Dept: FAMILY MEDICINE CLINIC | Age: 57
End: 2022-12-30

## 2022-12-30 VITALS
WEIGHT: 242.2 LBS | TEMPERATURE: 97.4 F | HEART RATE: 104 BPM | SYSTOLIC BLOOD PRESSURE: 146 MMHG | OXYGEN SATURATION: 97 % | BODY MASS INDEX: 41.57 KG/M2 | DIASTOLIC BLOOD PRESSURE: 90 MMHG

## 2022-12-30 DIAGNOSIS — R35.0 URINARY FREQUENCY: Primary | ICD-10-CM

## 2022-12-30 DIAGNOSIS — N39.0 URINARY TRACT INFECTION WITHOUT HEMATURIA, SITE UNSPECIFIED: Primary | ICD-10-CM

## 2022-12-30 DIAGNOSIS — R35.0 URINARY FREQUENCY: ICD-10-CM

## 2022-12-30 DIAGNOSIS — N39.0 URINARY TRACT INFECTION WITHOUT HEMATURIA, SITE UNSPECIFIED: ICD-10-CM

## 2022-12-30 LAB
BILIRUBIN, POC: NORMAL
BLOOD URINE, POC: NORMAL
CLARITY, POC: NORMAL
COLOR, POC: YELLOW
GLUCOSE URINE, POC: NORMAL
KETONES, POC: NORMAL
LEUKOCYTE EST, POC: NORMAL
NITRITE, POC: POSITIVE
PH, POC: 6
PROTEIN, POC: NORMAL
SPECIFIC GRAVITY, POC: >=1.03
UROBILINOGEN, POC: 0.2

## 2022-12-30 PROCEDURE — 81002 URINALYSIS NONAUTO W/O SCOPE: CPT | Performed by: EMERGENCY MEDICINE

## 2022-12-30 PROCEDURE — 3080F DIAST BP >= 90 MM HG: CPT | Performed by: EMERGENCY MEDICINE

## 2022-12-30 PROCEDURE — 3077F SYST BP >= 140 MM HG: CPT | Performed by: EMERGENCY MEDICINE

## 2022-12-30 PROCEDURE — 99213 OFFICE O/P EST LOW 20 MIN: CPT | Performed by: EMERGENCY MEDICINE

## 2022-12-30 PROCEDURE — G8427 DOCREV CUR MEDS BY ELIG CLIN: HCPCS | Performed by: EMERGENCY MEDICINE

## 2022-12-30 PROCEDURE — G8417 CALC BMI ABV UP PARAM F/U: HCPCS | Performed by: EMERGENCY MEDICINE

## 2022-12-30 PROCEDURE — 3017F COLORECTAL CA SCREEN DOC REV: CPT | Performed by: EMERGENCY MEDICINE

## 2022-12-30 PROCEDURE — G8484 FLU IMMUNIZE NO ADMIN: HCPCS | Performed by: EMERGENCY MEDICINE

## 2022-12-30 PROCEDURE — 1036F TOBACCO NON-USER: CPT | Performed by: EMERGENCY MEDICINE

## 2022-12-30 RX ORDER — CEFDINIR 300 MG/1
300 CAPSULE ORAL 2 TIMES DAILY
Qty: 20 CAPSULE | Refills: 0 | Status: SHIPPED | OUTPATIENT
Start: 2022-12-30 | End: 2023-01-09

## 2022-12-30 RX ORDER — LEVOFLOXACIN 500 MG/1
500 TABLET, FILM COATED ORAL DAILY
Qty: 10 TABLET | Refills: 0 | Status: SHIPPED
Start: 2022-12-30 | End: 2022-12-30

## 2022-12-30 ASSESSMENT — ENCOUNTER SYMPTOMS
EYE PAIN: 0
SINUS PRESSURE: 0
WHEEZING: 0
COUGH: 0
VOMITING: 0
NAUSEA: 0
BACK PAIN: 0
EYE DISCHARGE: 0
ABDOMINAL DISTENTION: 0
DIARRHEA: 0
SHORTNESS OF BREATH: 0
EYE REDNESS: 0
SORE THROAT: 0

## 2022-12-30 NOTE — TELEPHONE ENCOUNTER
The pt is calling asking if a different antibiotic can be sent over to the pharmacy for her, she's never had a reaction from it but her mom has had a bad reaction from it and she is nervous about taking it

## 2022-12-30 NOTE — PROGRESS NOTES
Chief Complaint:   Urinary Frequency and Abdominal Pain      History of Present Illness   Source of history provided by:  patient. Jade Borrego is a 62 y.o. old female who has a past medical history of:   Past Medical History:   Diagnosis Date    Atrial septal aneurysm     Gall bladder disease     Herniated disc     Hyperlipidemia     Hypertension     Infectious lymphangitis of deep lymphatics     Kidney stone     presents to the [facility] care for dysuria. Pt states the symptoms have progressed over the past 5 days. Pt states they have had increased frequency, lower abdominal pressure, and occasional nausea. No flank pain. Denies any vaginal discharge, vaginal bleeding, vomiting, diarrhea, or lethargy. No LMP recorded. ROS   Review of Systems   Constitutional:  Negative for chills and fever. HENT:  Negative for ear pain, sinus pressure and sore throat. Eyes:  Negative for pain, discharge and redness. Respiratory:  Negative for cough, shortness of breath and wheezing. Cardiovascular:  Negative for chest pain. Gastrointestinal:  Negative for abdominal distention, diarrhea, nausea and vomiting. Genitourinary:  Positive for dysuria and frequency. Musculoskeletal:  Negative for arthralgias and back pain. Skin:  Negative for rash and wound. Neurological:  Negative for weakness and headaches. Hematological:  Negative for adenopathy. Psychiatric/Behavioral: Negative. All other systems reviewed and are negative. Past Surgical history:   Past Surgical History:   Procedure Laterality Date     SECTION      WISDOM TOOTH EXTRACTION       Social History:  reports that she has never smoked. She has never used smokeless tobacco. She reports that she does not drink alcohol and does not use drugs.   Family History: family history includes Arthritis in her brother and mother; Cancer in her sister; Colon Cancer in her father; Heart Disease in her brother and mother; Hypertension in her mother. Allergies: Patient has no known allergies. Outpatient Encounter Medications as of 12/30/2022   Medication Sig Dispense Refill    levoFLOXacin (LEVAQUIN) 500 MG tablet Take 1 tablet by mouth daily for 10 days 10 tablet 0    furosemide (LASIX) 20 MG tablet TAKE 1 TABLET BY MOUTH EVERY DAY 90 tablet 0    atorvastatin (LIPITOR) 40 MG tablet TAKE 1 TABLET BY MOUTH EVERY DAY 90 tablet 3    meloxicam (MOBIC) 15 MG tablet TAKE 1 TABLET BY MOUTH EVERY DAY 90 tablet 3    metoprolol tartrate (LOPRESSOR) 50 MG tablet TAKE 1 TABLET BY MOUTH TWICE A  tablet 3    valsartan (DIOVAN) 80 MG tablet TAKE 1 TABLET BY MOUTH EVERY DAY 90 tablet 1    DULoxetine (CYMBALTA) 60 MG extended release capsule TAKE 1 CAPSULE BY MOUTH EVERY DAY 90 capsule 3    D3-50 1.25 MG (22403 UT) CAPS TAKE 1 CAPSULE BY MOUTH ONE TIME PER WEEK 12 capsule 1    loratadine (CLARITIN) 10 MG tablet TAKE 1 TABLET BY MOUTH EVERY DAY 30 tablet 0    tiotropium (SPIRIVA RESPIMAT) 1.25 MCG/ACT AERS inhaler Inhale 2 puffs into the lungs daily 1 each 0    albuterol-ipratropium (COMBIVENT RESPIMAT)  MCG/ACT AERS inhaler Inhale 1 puff into the lungs every 6 hours 1 each 1    ARIPiprazole (ABILIFY) 5 MG tablet Take 1 tablet by mouth daily 90 tablet 3    meclizine (ANTIVERT) 25 MG tablet TAKE 1 TABLET BY MOUTH EVERY 8 HOURS AS NEEDED      naproxen (NAPROSYN) 500 MG tablet TAKE 1 TABLET BY MOUTH EVERY 12 HOURS      diclofenac sodium (VOLTAREN) 1 % GEL APPLY TO AFFECTED AREA THREE TIMES PER DAY       No facility-administered encounter medications on file as of 12/30/2022. Physical Exam         VS:  BP (!) 146/90 (Site: Right Upper Arm, Position: Sitting)   Pulse (!) 104   Temp 97.4 °F (36.3 °C) (Temporal)   Wt 242 lb 3.2 oz (109.9 kg)   SpO2 97%   BMI 41.57 kg/m²    Oxygen Saturation Interpretation: Normal.    Physical Exam  Vitals and nursing note reviewed. Constitutional:       Appearance: She is well-developed.    HENT:      Head: Normocephalic and atraumatic. Right Ear: Hearing and external ear normal.      Left Ear: Hearing and external ear normal.      Nose: Nose normal.      Mouth/Throat:      Pharynx: Uvula midline. Eyes:      General: Lids are normal.      Conjunctiva/sclera: Conjunctivae normal.      Pupils: Pupils are equal, round, and reactive to light. Cardiovascular:      Rate and Rhythm: Normal rate and regular rhythm. Heart sounds: Normal heart sounds. No murmur heard. Pulmonary:      Effort: Pulmonary effort is normal.      Breath sounds: Normal breath sounds. Abdominal:      General: Bowel sounds are normal.      Palpations: Abdomen is soft. Abdomen is not rigid. Tenderness: There is no abdominal tenderness. There is no guarding or rebound. Musculoskeletal:      Cervical back: Normal range of motion and neck supple. Skin:     General: Skin is warm and dry. Findings: No abrasion or rash. Neurological:      Mental Status: She is alert and oriented to person, place, and time. GCS: GCS eye subscore is 4. GCS verbal subscore is 5. GCS motor subscore is 6. Cranial Nerves: No cranial nerve deficit. Sensory: No sensory deficit. Coordination: Coordination normal.      Gait: Gait normal.        Lab / Imaging Results   (All laboratory and radiology results have been personally reviewed by myself)  Labs:  Results for orders placed or performed in visit on 12/30/22   POCT Urinalysis no Micro   Result Value Ref Range    Color, UA yellow     Clarity, UA cloudy     Glucose, UA POC neg     Bilirubin, UA small     Ketones, UA neg     Spec Grav, UA >=1.030     Blood, UA POC small     pH, UA 6.0     Protein, UA POC >=300 mg     Urobilinogen, UA 0.2     Leukocytes, UA trace     Nitrite, UA positive        Imaging: All Radiology results interpreted by Radiologist unless otherwise noted.   No orders to display       Medical Decision Making:    Patient is well appearing, non toxic and appropriate for outpatient management. Plan is for symptom management and PCP follow up. Assessment / Plan     Impression(s):  Christa Gayle was seen today for urinary frequency and abdominal pain. Diagnoses and all orders for this visit:    Urinary frequency  -     POCT Urinalysis no Micro  -     Culture, Urine; Future    Urinary tract infection without hematuria, site unspecified  -     levoFLOXacin (LEVAQUIN) 500 MG tablet; Take 1 tablet by mouth daily for 10 days       Disposition:    Follow up with PCP in 7-10 days for repeat urine and recheck of symptoms. ER if changes or worse. Advised to take all medications as directed.

## 2023-01-03 RX ORDER — NITROFURANTOIN 25; 75 MG/1; MG/1
100 CAPSULE ORAL 2 TIMES DAILY
Qty: 20 CAPSULE | Refills: 0 | Status: SHIPPED | OUTPATIENT
Start: 2023-01-03 | End: 2023-01-13

## 2023-01-18 ENCOUNTER — TELEPHONE (OUTPATIENT)
Dept: PRIMARY CARE CLINIC | Age: 58
End: 2023-01-18

## 2023-01-18 RX ORDER — HYDROXYZINE PAMOATE 25 MG/1
25 CAPSULE ORAL 3 TIMES DAILY PRN
Qty: 42 CAPSULE | Refills: 0 | Status: SHIPPED | OUTPATIENT
Start: 2023-01-18 | End: 2023-02-01

## 2023-01-18 NOTE — TELEPHONE ENCOUNTER
Patient calling she is scratching her skin raw she does not have a rash she is concerned about anxiety related due to her dogs recent  passing. Wanting to know your recommendations.

## 2023-01-28 DIAGNOSIS — I10 UNCONTROLLED HYPERTENSION: ICD-10-CM

## 2023-01-30 RX ORDER — VALSARTAN 80 MG/1
TABLET ORAL
Qty: 90 TABLET | Refills: 1 | Status: SHIPPED | OUTPATIENT
Start: 2023-01-30

## 2023-02-23 DIAGNOSIS — N39.0 URINARY TRACT INFECTION WITHOUT HEMATURIA, SITE UNSPECIFIED: ICD-10-CM

## 2023-02-23 RX ORDER — LEVOFLOXACIN 500 MG/1
TABLET, FILM COATED ORAL
Qty: 10 TABLET | Refills: 0 | OUTPATIENT
Start: 2023-02-23

## 2023-02-23 RX ORDER — HYDROXYZINE PAMOATE 25 MG/1
25 CAPSULE ORAL 3 TIMES DAILY PRN
Qty: 42 CAPSULE | Refills: 0 | Status: SHIPPED | OUTPATIENT
Start: 2023-02-23 | End: 2023-03-09

## 2023-03-16 ENCOUNTER — HOSPITAL ENCOUNTER (INPATIENT)
Age: 58
LOS: 4 days | Discharge: HOME | DRG: 134 | End: 2023-03-20
Attending: EMERGENCY MEDICINE | Admitting: INTERNAL MEDICINE
Payer: COMMERCIAL

## 2023-03-16 ENCOUNTER — APPOINTMENT (OUTPATIENT)
Dept: ULTRASOUND IMAGING | Age: 58
DRG: 134 | End: 2023-03-16
Payer: COMMERCIAL

## 2023-03-16 ENCOUNTER — APPOINTMENT (OUTPATIENT)
Dept: CT IMAGING | Age: 58
DRG: 134 | End: 2023-03-16
Payer: COMMERCIAL

## 2023-03-16 ENCOUNTER — OFFICE VISIT (OUTPATIENT)
Dept: FAMILY MEDICINE CLINIC | Age: 58
End: 2023-03-16
Payer: COMMERCIAL

## 2023-03-16 ENCOUNTER — APPOINTMENT (OUTPATIENT)
Dept: GENERAL RADIOLOGY | Age: 58
DRG: 134 | End: 2023-03-16
Payer: COMMERCIAL

## 2023-03-16 VITALS
OXYGEN SATURATION: 81 % | WEIGHT: 229 LBS | BODY MASS INDEX: 39.09 KG/M2 | TEMPERATURE: 97 F | RESPIRATION RATE: 22 BRPM | HEIGHT: 64 IN | SYSTOLIC BLOOD PRESSURE: 122 MMHG | HEART RATE: 114 BPM | DIASTOLIC BLOOD PRESSURE: 96 MMHG

## 2023-03-16 DIAGNOSIS — I26.09 OTHER ACUTE PULMONARY EMBOLISM WITH ACUTE COR PULMONALE (HCC): Primary | ICD-10-CM

## 2023-03-16 DIAGNOSIS — R09.02 HYPOXIA: ICD-10-CM

## 2023-03-16 DIAGNOSIS — J96.01 ACUTE RESPIRATORY FAILURE WITH HYPOXIA (HCC): ICD-10-CM

## 2023-03-16 DIAGNOSIS — R06.00 DYSPNEA, UNSPECIFIED TYPE: Primary | ICD-10-CM

## 2023-03-16 DIAGNOSIS — R77.8 ELEVATED TROPONIN: ICD-10-CM

## 2023-03-16 PROBLEM — I26.99 BILATERAL PULMONARY EMBOLISM (HCC): Status: ACTIVE | Noted: 2023-03-16

## 2023-03-16 PROBLEM — I26.99 PULMONARY EMBOLISM, BILATERAL (HCC): Status: ACTIVE | Noted: 2023-03-16

## 2023-03-16 PROBLEM — R94.31 ACUTE ELECTROCARDIOGRAM CHANGES: Status: ACTIVE | Noted: 2023-03-16

## 2023-03-16 LAB
ALBUMIN SERPL-MCNC: 4 G/DL (ref 3.5–5.2)
ALP SERPL-CCNC: 142 U/L (ref 35–104)
ALT SERPL-CCNC: 15 U/L (ref 0–32)
ANION GAP SERPL CALCULATED.3IONS-SCNC: 14 MMOL/L (ref 7–16)
APTT BLD: 31.2 SEC (ref 24.5–35.1)
AST SERPL-CCNC: 16 U/L (ref 0–31)
B PARAP IS1001 DNA NPH QL NAA+NON-PROBE: NOT DETECTED
B PERT.PT PRMT NPH QL NAA+NON-PROBE: NOT DETECTED
BASOPHILS # BLD: 0.06 E9/L (ref 0–0.2)
BASOPHILS NFR BLD: 0.4 % (ref 0–2)
BILIRUB SERPL-MCNC: 0.5 MG/DL (ref 0–1.2)
BNP BLD-MCNC: 6058 PG/ML (ref 0–125)
BUN SERPL-MCNC: 17 MG/DL (ref 6–20)
C PNEUM DNA NPH QL NAA+NON-PROBE: NOT DETECTED
CALCIUM SERPL-MCNC: 9.3 MG/DL (ref 8.6–10.2)
CHLORIDE SERPL-SCNC: 103 MMOL/L (ref 98–107)
CO2 SERPL-SCNC: 22 MMOL/L (ref 22–29)
CREAT SERPL-MCNC: 0.8 MG/DL (ref 0.5–1)
D DIMER: 1004 NG/ML DDU
EOSINOPHIL # BLD: 0.06 E9/L (ref 0.05–0.5)
EOSINOPHIL NFR BLD: 0.4 % (ref 0–6)
ERYTHROCYTE [DISTWIDTH] IN BLOOD BY AUTOMATED COUNT: 16.3 FL (ref 11.5–15)
ERYTHROCYTE [DISTWIDTH] IN BLOOD BY AUTOMATED COUNT: 16.3 FL (ref 11.5–15)
ERYTHROCYTE [SEDIMENTATION RATE] IN BLOOD BY WESTERGREN METHOD: 36 MM/HR (ref 0–20)
FLUAV RNA NPH QL NAA+NON-PROBE: NOT DETECTED
FLUBV RNA NPH QL NAA+NON-PROBE: NOT DETECTED
GLUCOSE SERPL-MCNC: 151 MG/DL (ref 74–99)
HADV DNA NPH QL NAA+NON-PROBE: NOT DETECTED
HCOV 229E RNA NPH QL NAA+NON-PROBE: NOT DETECTED
HCOV HKU1 RNA NPH QL NAA+NON-PROBE: NOT DETECTED
HCOV NL63 RNA NPH QL NAA+NON-PROBE: NOT DETECTED
HCOV OC43 RNA NPH QL NAA+NON-PROBE: NOT DETECTED
HCT VFR BLD AUTO: 41.5 % (ref 34–48)
HCT VFR BLD AUTO: 43.9 % (ref 34–48)
HGB BLD-MCNC: 13.3 G/DL (ref 11.5–15.5)
HGB BLD-MCNC: 14.2 G/DL (ref 11.5–15.5)
HMPV RNA NPH QL NAA+NON-PROBE: NOT DETECTED
HPIV1 RNA NPH QL NAA+NON-PROBE: NOT DETECTED
HPIV2 RNA NPH QL NAA+NON-PROBE: NOT DETECTED
HPIV3 RNA NPH QL NAA+NON-PROBE: NOT DETECTED
HPIV4 RNA NPH QL NAA+NON-PROBE: NOT DETECTED
IMM GRANULOCYTES # BLD: 0.07 E9/L
IMM GRANULOCYTES NFR BLD: 0.5 % (ref 0–5)
INFLUENZA A ANTIBODY: NEGATIVE
INFLUENZA A BY PCR: NOT DETECTED
INFLUENZA B ANTIBODY: NEGATIVE
INFLUENZA B BY PCR: NOT DETECTED
LACTATE BLDV-SCNC: 3.2 MMOL/L (ref 0.5–2.2)
LV EF: 75 %
LVEF MODALITY: NORMAL
LYMPHOCYTES # BLD: 1.79 E9/L (ref 1.5–4)
LYMPHOCYTES NFR BLD: 12 % (ref 20–42)
Lab: NORMAL
M PNEUMO DNA NPH QL NAA+NON-PROBE: NOT DETECTED
MCH RBC QN AUTO: 28.5 PG (ref 26–35)
MCH RBC QN AUTO: 28.6 PG (ref 26–35)
MCHC RBC AUTO-ENTMCNC: 32 % (ref 32–34.5)
MCHC RBC AUTO-ENTMCNC: 32.3 % (ref 32–34.5)
MCV RBC AUTO: 88.3 FL (ref 80–99.9)
MCV RBC AUTO: 89.1 FL (ref 80–99.9)
MONOCYTES # BLD: 0.68 E9/L (ref 0.1–0.95)
MONOCYTES NFR BLD: 4.5 % (ref 2–12)
NEUTROPHILS # BLD: 12.29 E9/L (ref 1.8–7.3)
NEUTS SEG NFR BLD: 82.2 % (ref 43–80)
PERFORMING INSTRUMENT: NORMAL
PLATELET # BLD AUTO: 345 E9/L (ref 130–450)
PLATELET # BLD AUTO: 368 E9/L (ref 130–450)
PMV BLD AUTO: 9.7 FL (ref 7–12)
PMV BLD AUTO: 9.8 FL (ref 7–12)
POTASSIUM SERPL-SCNC: 3.6 MMOL/L (ref 3.5–5)
PROT SERPL-MCNC: 7.5 G/DL (ref 6.4–8.3)
QC PASS/FAIL: NORMAL
RBC # BLD AUTO: 4.66 E12/L (ref 3.5–5.5)
RBC # BLD AUTO: 4.97 E12/L (ref 3.5–5.5)
RSV ANTIGEN: NEGATIVE
RSV RNA NPH QL NAA+NON-PROBE: NOT DETECTED
RV+EV RNA NPH QL NAA+NON-PROBE: NOT DETECTED
SARS-COV-2 RDRP RESP QL NAA+PROBE: NOT DETECTED
SARS-COV-2 RNA NPH QL NAA+NON-PROBE: NOT DETECTED
SARS-COV-2, POC: NORMAL
SODIUM SERPL-SCNC: 139 MMOL/L (ref 132–146)
TROPONIN, HIGH SENSITIVITY: 42 NG/L (ref 0–9)
TROPONIN, HIGH SENSITIVITY: 43 NG/L (ref 0–9)
WBC # BLD: 15 E9/L (ref 4.5–11.5)
WBC # BLD: 16.1 E9/L (ref 4.5–11.5)

## 2023-03-16 PROCEDURE — 96376 TX/PRO/DX INJ SAME DRUG ADON: CPT

## 2023-03-16 PROCEDURE — 84484 ASSAY OF TROPONIN QUANT: CPT

## 2023-03-16 PROCEDURE — 6360000004 HC RX CONTRAST MEDICATION: Performed by: INTERNAL MEDICINE

## 2023-03-16 PROCEDURE — 96366 THER/PROPH/DIAG IV INF ADDON: CPT

## 2023-03-16 PROCEDURE — 85025 COMPLETE CBC W/AUTO DIFF WBC: CPT

## 2023-03-16 PROCEDURE — 2580000003 HC RX 258

## 2023-03-16 PROCEDURE — 99205 OFFICE O/P NEW HI 60 MIN: CPT | Performed by: PHYSICIAN ASSISTANT

## 2023-03-16 PROCEDURE — 87804 INFLUENZA ASSAY W/OPTIC: CPT | Performed by: PHYSICIAN ASSISTANT

## 2023-03-16 PROCEDURE — 2580000003 HC RX 258: Performed by: RADIOLOGY

## 2023-03-16 PROCEDURE — 83605 ASSAY OF LACTIC ACID: CPT

## 2023-03-16 PROCEDURE — 85027 COMPLETE CBC AUTOMATED: CPT

## 2023-03-16 PROCEDURE — 99223 1ST HOSP IP/OBS HIGH 75: CPT | Performed by: INTERNAL MEDICINE

## 2023-03-16 PROCEDURE — 93005 ELECTROCARDIOGRAM TRACING: CPT | Performed by: EMERGENCY MEDICINE

## 2023-03-16 PROCEDURE — 93000 ELECTROCARDIOGRAM COMPLETE: CPT | Performed by: PHYSICIAN ASSISTANT

## 2023-03-16 PROCEDURE — 87449 NOS EACH ORGANISM AG IA: CPT

## 2023-03-16 PROCEDURE — 93306 TTE W/DOPPLER COMPLETE: CPT

## 2023-03-16 PROCEDURE — 71275 CT ANGIOGRAPHY CHEST: CPT

## 2023-03-16 PROCEDURE — 87635 SARS-COV-2 COVID-19 AMP PRB: CPT

## 2023-03-16 PROCEDURE — 6360000002 HC RX W HCPCS: Performed by: EMERGENCY MEDICINE

## 2023-03-16 PROCEDURE — 96375 TX/PRO/DX INJ NEW DRUG ADDON: CPT

## 2023-03-16 PROCEDURE — 85730 THROMBOPLASTIN TIME PARTIAL: CPT

## 2023-03-16 PROCEDURE — 2580000003 HC RX 258: Performed by: EMERGENCY MEDICINE

## 2023-03-16 PROCEDURE — 85378 FIBRIN DEGRADE SEMIQUANT: CPT

## 2023-03-16 PROCEDURE — 3017F COLORECTAL CA SCREEN DOC REV: CPT | Performed by: PHYSICIAN ASSISTANT

## 2023-03-16 PROCEDURE — 2000000000 HC ICU R&B

## 2023-03-16 PROCEDURE — 86140 C-REACTIVE PROTEIN: CPT

## 2023-03-16 PROCEDURE — G8427 DOCREV CUR MEDS BY ELIG CLIN: HCPCS | Performed by: PHYSICIAN ASSISTANT

## 2023-03-16 PROCEDURE — 6360000004 HC RX CONTRAST MEDICATION: Performed by: RADIOLOGY

## 2023-03-16 PROCEDURE — 81001 URINALYSIS AUTO W/SCOPE: CPT

## 2023-03-16 PROCEDURE — 93970 EXTREMITY STUDY: CPT

## 2023-03-16 PROCEDURE — 80053 COMPREHEN METABOLIC PANEL: CPT

## 2023-03-16 PROCEDURE — G8417 CALC BMI ABV UP PARAM F/U: HCPCS | Performed by: PHYSICIAN ASSISTANT

## 2023-03-16 PROCEDURE — 3074F SYST BP LT 130 MM HG: CPT | Performed by: PHYSICIAN ASSISTANT

## 2023-03-16 PROCEDURE — 87426 SARSCOV CORONAVIRUS AG IA: CPT | Performed by: PHYSICIAN ASSISTANT

## 2023-03-16 PROCEDURE — 6360000002 HC RX W HCPCS

## 2023-03-16 PROCEDURE — 1036F TOBACCO NON-USER: CPT | Performed by: PHYSICIAN ASSISTANT

## 2023-03-16 PROCEDURE — 99291 CRITICAL CARE FIRST HOUR: CPT

## 2023-03-16 PROCEDURE — 86756 RESPIRATORY VIRUS ANTIBODY: CPT | Performed by: PHYSICIAN ASSISTANT

## 2023-03-16 PROCEDURE — 99285 EMERGENCY DEPT VISIT HI MDM: CPT

## 2023-03-16 PROCEDURE — 87502 INFLUENZA DNA AMP PROBE: CPT

## 2023-03-16 PROCEDURE — 87088 URINE BACTERIA CULTURE: CPT

## 2023-03-16 PROCEDURE — 3080F DIAST BP >= 90 MM HG: CPT | Performed by: PHYSICIAN ASSISTANT

## 2023-03-16 PROCEDURE — 85651 RBC SED RATE NONAUTOMATED: CPT

## 2023-03-16 PROCEDURE — 84145 PROCALCITONIN (PCT): CPT

## 2023-03-16 PROCEDURE — 87081 CULTURE SCREEN ONLY: CPT

## 2023-03-16 PROCEDURE — 96365 THER/PROPH/DIAG IV INF INIT: CPT

## 2023-03-16 PROCEDURE — 0202U NFCT DS 22 TRGT SARS-COV-2: CPT

## 2023-03-16 PROCEDURE — G8484 FLU IMMUNIZE NO ADMIN: HCPCS | Performed by: PHYSICIAN ASSISTANT

## 2023-03-16 PROCEDURE — 83880 ASSAY OF NATRIURETIC PEPTIDE: CPT

## 2023-03-16 RX ORDER — ONDANSETRON 2 MG/ML
4 INJECTION INTRAMUSCULAR; INTRAVENOUS ONCE
Status: COMPLETED | OUTPATIENT
Start: 2023-03-16 | End: 2023-03-16

## 2023-03-16 RX ORDER — PANTOPRAZOLE SODIUM 40 MG/1
40 TABLET, DELAYED RELEASE ORAL
Status: DISCONTINUED | OUTPATIENT
Start: 2023-03-17 | End: 2023-03-20 | Stop reason: HOSPADM

## 2023-03-16 RX ORDER — ATORVASTATIN CALCIUM 40 MG/1
40 TABLET, FILM COATED ORAL DAILY
Status: DISCONTINUED | OUTPATIENT
Start: 2023-03-17 | End: 2023-03-17

## 2023-03-16 RX ORDER — IPRATROPIUM BROMIDE AND ALBUTEROL SULFATE 2.5; .5 MG/3ML; MG/3ML
1 SOLUTION RESPIRATORY (INHALATION)
Status: DISCONTINUED | OUTPATIENT
Start: 2023-03-17 | End: 2023-03-20 | Stop reason: HOSPADM

## 2023-03-16 RX ORDER — DULOXETIN HYDROCHLORIDE 60 MG/1
60 CAPSULE, DELAYED RELEASE ORAL DAILY
Status: DISCONTINUED | OUTPATIENT
Start: 2023-03-17 | End: 2023-03-20 | Stop reason: HOSPADM

## 2023-03-16 RX ORDER — HEPARIN SODIUM 1000 [USP'U]/ML
80 INJECTION, SOLUTION INTRAVENOUS; SUBCUTANEOUS PRN
Status: DISCONTINUED | OUTPATIENT
Start: 2023-03-16 | End: 2023-03-20 | Stop reason: ALTCHOICE

## 2023-03-16 RX ORDER — HEPARIN SODIUM 10000 [USP'U]/100ML
5-30 INJECTION, SOLUTION INTRAVENOUS CONTINUOUS
Status: DISPENSED | OUTPATIENT
Start: 2023-03-16 | End: 2023-03-19

## 2023-03-16 RX ORDER — HEPARIN SODIUM 1000 [USP'U]/ML
80 INJECTION, SOLUTION INTRAVENOUS; SUBCUTANEOUS ONCE
Status: COMPLETED | OUTPATIENT
Start: 2023-03-16 | End: 2023-03-16

## 2023-03-16 RX ORDER — SODIUM CHLORIDE 0.9 % (FLUSH) 0.9 %
5-40 SYRINGE (ML) INJECTION PRN
Status: DISCONTINUED | OUTPATIENT
Start: 2023-03-16 | End: 2023-03-20 | Stop reason: HOSPADM

## 2023-03-16 RX ORDER — METOPROLOL TARTRATE 50 MG/1
50 TABLET, FILM COATED ORAL 2 TIMES DAILY
Status: DISCONTINUED | OUTPATIENT
Start: 2023-03-16 | End: 2023-03-20 | Stop reason: HOSPADM

## 2023-03-16 RX ORDER — SODIUM CHLORIDE 9 MG/ML
INJECTION, SOLUTION INTRAVENOUS PRN
Status: DISCONTINUED | OUTPATIENT
Start: 2023-03-16 | End: 2023-03-20

## 2023-03-16 RX ORDER — SODIUM CHLORIDE, SODIUM LACTATE, POTASSIUM CHLORIDE, CALCIUM CHLORIDE 600; 310; 30; 20 MG/100ML; MG/100ML; MG/100ML; MG/100ML
INJECTION, SOLUTION INTRAVENOUS CONTINUOUS
Status: DISCONTINUED | OUTPATIENT
Start: 2023-03-16 | End: 2023-03-18

## 2023-03-16 RX ORDER — HEPARIN SODIUM 1000 [USP'U]/ML
40 INJECTION, SOLUTION INTRAVENOUS; SUBCUTANEOUS PRN
Status: DISCONTINUED | OUTPATIENT
Start: 2023-03-16 | End: 2023-03-20 | Stop reason: ALTCHOICE

## 2023-03-16 RX ORDER — SODIUM CHLORIDE 0.9 % (FLUSH) 0.9 %
5-40 SYRINGE (ML) INJECTION EVERY 12 HOURS SCHEDULED
Status: DISCONTINUED | OUTPATIENT
Start: 2023-03-17 | End: 2023-03-20 | Stop reason: HOSPADM

## 2023-03-16 RX ORDER — DEXTROSE MONOHYDRATE 25 G/50ML
12.5 INJECTION, SOLUTION INTRAVENOUS
Status: ACTIVE | OUTPATIENT
Start: 2023-03-16 | End: 2023-03-17

## 2023-03-16 RX ORDER — DIPHENHYDRAMINE HYDROCHLORIDE 50 MG/ML
25 INJECTION INTRAMUSCULAR; INTRAVENOUS ONCE
Status: COMPLETED | OUTPATIENT
Start: 2023-03-16 | End: 2023-03-16

## 2023-03-16 RX ORDER — 0.9 % SODIUM CHLORIDE 0.9 %
1000 INTRAVENOUS SOLUTION INTRAVENOUS ONCE
Status: COMPLETED | OUTPATIENT
Start: 2023-03-16 | End: 2023-03-16

## 2023-03-16 RX ORDER — OXYBUTYNIN CHLORIDE 5 MG/1
5 TABLET ORAL 2 TIMES DAILY
COMMUNITY

## 2023-03-16 RX ORDER — ACETAMINOPHEN 325 MG/1
650 TABLET ORAL EVERY 6 HOURS PRN
Status: DISCONTINUED | OUTPATIENT
Start: 2023-03-16 | End: 2023-03-20 | Stop reason: HOSPADM

## 2023-03-16 RX ORDER — CETIRIZINE HYDROCHLORIDE 10 MG/1
5 TABLET ORAL DAILY
Status: DISCONTINUED | OUTPATIENT
Start: 2023-03-17 | End: 2023-03-20 | Stop reason: HOSPADM

## 2023-03-16 RX ORDER — ARIPIPRAZOLE 5 MG/1
5 TABLET ORAL DAILY
Status: DISCONTINUED | OUTPATIENT
Start: 2023-03-17 | End: 2023-03-20 | Stop reason: HOSPADM

## 2023-03-16 RX ORDER — ONDANSETRON 2 MG/ML
4 INJECTION INTRAMUSCULAR; INTRAVENOUS EVERY 6 HOURS PRN
Status: DISCONTINUED | OUTPATIENT
Start: 2023-03-16 | End: 2023-03-20 | Stop reason: HOSPADM

## 2023-03-16 RX ORDER — SODIUM CHLORIDE 0.9 % (FLUSH) 0.9 %
10 SYRINGE (ML) INJECTION PRN
Status: COMPLETED | OUTPATIENT
Start: 2023-03-16 | End: 2023-03-16

## 2023-03-16 RX ADMIN — SODIUM CHLORIDE 1000 ML: 9 INJECTION, SOLUTION INTRAVENOUS at 17:58

## 2023-03-16 RX ADMIN — ALTEPLASE 50 MG: KIT at 21:26

## 2023-03-16 RX ADMIN — PERFLUTREN 1.5 ML: 6.52 INJECTION, SUSPENSION INTRAVENOUS at 19:40

## 2023-03-16 RX ADMIN — DIPHENHYDRAMINE HYDROCHLORIDE 25 MG: 50 INJECTION, SOLUTION INTRAMUSCULAR; INTRAVENOUS at 16:58

## 2023-03-16 RX ADMIN — ONDANSETRON 4 MG: 2 INJECTION INTRAMUSCULAR; INTRAVENOUS at 16:59

## 2023-03-16 RX ADMIN — HEPARIN SODIUM 18 UNITS/KG/HR: 10000 INJECTION, SOLUTION INTRAVENOUS at 18:18

## 2023-03-16 RX ADMIN — SODIUM CHLORIDE, PRESERVATIVE FREE 10 ML: 5 INJECTION INTRAVENOUS at 17:07

## 2023-03-16 RX ADMIN — SODIUM CHLORIDE, POTASSIUM CHLORIDE, SODIUM LACTATE AND CALCIUM CHLORIDE: 600; 310; 30; 20 INJECTION, SOLUTION INTRAVENOUS at 23:58

## 2023-03-16 RX ADMIN — HEPARIN SODIUM 8310 UNITS: 1000 INJECTION INTRAVENOUS; SUBCUTANEOUS at 18:17

## 2023-03-16 RX ADMIN — IOPAMIDOL 75 ML: 755 INJECTION, SOLUTION INTRAVENOUS at 17:09

## 2023-03-16 NOTE — PROGRESS NOTES
3/16/23  Ranulfo Huertas : 1965 Sex: female  Age 62 y.o. Subjective:  Chief Complaint   Patient presents with    Pharyngitis    Shortness of Breath         HPI:   Ranulfo Huertas , 62 y.o. female presents to express care for evaluation of cough, congestion, shortness of breath    HPI  59-year-old female presents to express care for evaluation of cough, congestion, drainage, shortness of breath. The patient started with the symptoms over the last couple of days. The patient arrived here and was noted to be increasingly short of breath. The patient had a pulse ox of 81%. The patient was noted to be tachycardic. The patient states that she is really struggling to breathe. The patient denies any chest pain associated. No fevers. ROS:   Unless otherwise stated in this report the patient's positive and negative responses for review of systems for constitutional, eyes, ENT, cardiovascular, respiratory, gastrointestinal, neurological, , musculoskeletal, and integument systems and related systems to the presenting problem are either stated in the history of present illness or were not pertinent or were negative for the symptoms and/or complaints related to the presenting medical problem. Positives and pertinent negatives as per HPI. All others reviewed and are negative.       PMH:     Past Medical History:   Diagnosis Date    Atrial septal aneurysm     Gall bladder disease     Herniated disc     Hyperlipidemia     Hypertension     Infectious lymphangitis of deep lymphatics     Kidney stone        Past Surgical History:   Procedure Laterality Date     SECTION      WISDOM TOOTH EXTRACTION         Family History   Problem Relation Age of Onset    Colon Cancer Father     Cancer Sister     Heart Disease Brother     Arthritis Brother     Hypertension Mother     Arthritis Mother     Heart Disease Mother        Medications:     Current Outpatient Medications:     valsartan (DIOVAN) 80 MG tablet, TAKE 1 TABLET BY MOUTH EVERY DAY, Disp: 90 tablet, Rfl: 1    furosemide (LASIX) 20 MG tablet, TAKE 1 TABLET BY MOUTH EVERY DAY, Disp: 90 tablet, Rfl: 0    atorvastatin (LIPITOR) 40 MG tablet, TAKE 1 TABLET BY MOUTH EVERY DAY, Disp: 90 tablet, Rfl: 3    meloxicam (MOBIC) 15 MG tablet, TAKE 1 TABLET BY MOUTH EVERY DAY, Disp: 90 tablet, Rfl: 3    metoprolol tartrate (LOPRESSOR) 50 MG tablet, TAKE 1 TABLET BY MOUTH TWICE A DAY, Disp: 180 tablet, Rfl: 3    DULoxetine (CYMBALTA) 60 MG extended release capsule, TAKE 1 CAPSULE BY MOUTH EVERY DAY, Disp: 90 capsule, Rfl: 3    D3-50 1.25 MG (26866 UT) CAPS, TAKE 1 CAPSULE BY MOUTH ONE TIME PER WEEK, Disp: 12 capsule, Rfl: 1    loratadine (CLARITIN) 10 MG tablet, TAKE 1 TABLET BY MOUTH EVERY DAY, Disp: 30 tablet, Rfl: 0    tiotropium (SPIRIVA RESPIMAT) 1.25 MCG/ACT AERS inhaler, Inhale 2 puffs into the lungs daily, Disp: 1 each, Rfl: 0    albuterol-ipratropium (COMBIVENT RESPIMAT)  MCG/ACT AERS inhaler, Inhale 1 puff into the lungs every 6 hours, Disp: 1 each, Rfl: 1    ARIPiprazole (ABILIFY) 5 MG tablet, Take 1 tablet by mouth daily, Disp: 90 tablet, Rfl: 3    meclizine (ANTIVERT) 25 MG tablet, TAKE 1 TABLET BY MOUTH EVERY 8 HOURS AS NEEDED, Disp: , Rfl:     naproxen (NAPROSYN) 500 MG tablet, TAKE 1 TABLET BY MOUTH EVERY 12 HOURS, Disp: , Rfl:     diclofenac sodium (VOLTAREN) 1 % GEL, APPLY TO AFFECTED AREA THREE TIMES PER DAY, Disp: , Rfl:     Allergies:   No Known Allergies    Social History:     Social History     Tobacco Use    Smoking status: Never    Smokeless tobacco: Never   Vaping Use    Vaping Use: Never used   Substance Use Topics    Alcohol use: No    Drug use: No       Patient lives at home.     Physical Exam:     Vitals:    03/16/23 1504   BP: (!) 122/96   Site: Right Upper Arm   Position: Sitting   Cuff Size: Large Adult   Pulse: (!) 114   Resp: 22   Temp: 97 °F (36.1 °C)   TempSrc: Temporal   SpO2: (!) 81%   Weight: 229 lb (103.9 kg)   Height: 5' 4\" (1.626 m)       Exam:  Physical Exam  Nurses note and vital signs reviewed and patient is hypoxic    General: The patient is hypoxic, accessory muscle use, appears to be short of breath  Skin: Warm, dry, no pallor noted. There is no rash noted. Head: Normocephalic, atraumatic. Eye: Normal conjunctiva  Ears, Nose, Mouth, and Throat: Oral mucosa is moist  Cardiovascular: Regular Rate and Rhythm  Respiratory: The patient does appear to be in distress. The patient has increased work of breathing. The patient has diminished lung sounds bilaterally, there is no significant crackles noted  Neurological: A&O x4, normal speech      Testing:     No results found for this visit on 03/16/23. Medical Decision Making:     Vital signs reviewed    Past medical history reviewed. Allergies reviewed. Medications reviewed. The patient does appear to be in acute distress. The patient's oxygen was 81%. We placed her on 3 L and did getting her oxygen up into the low 90s. It has been hovering between 91 and 94%. The patient is noted to be tachycardic. RSV, influenza and COVID-negative    The patient had EKG obtained. EKG shows normal sinus rhythm, rate of 98, nonspecific ST-T wave changes, T wave inversion noted in precordial leads, normal axis, baseline artifact    EMS was called and the patient will be transported for further evaluation and assessment      Clinical Impression:   Justin Quan was seen today for pharyngitis and shortness of breath. Diagnoses and all orders for this visit:    Dyspnea, unspecified type  -     EKG 12 lead  -     POCT Influenza A/B  -     POCT COVID-19, Antigen  -     POCT RSV    Hypoxia  -     EKG 12 lead      go directly to the emergency department.      SIGNATURE: Mike Cha III, PA-C

## 2023-03-16 NOTE — ED NOTES
Per Ainsley Brood will read stat echo, Samantha SALDANA notified of order     Catherine Ford, ELIZABETH  24/63/35 [id number]

## 2023-03-16 NOTE — ED NOTES
Dr Sultana Payan at bedside      [facility], Replaced by [facility]  03/16/23 [id number]

## 2023-03-17 PROBLEM — I26.09 COR PULMONALE, ACUTE (HCC): Status: ACTIVE | Noted: 2023-03-17

## 2023-03-17 PROBLEM — I26.99 PULMONARY EMBOLUS (HCC): Status: ACTIVE | Noted: 2023-03-17

## 2023-03-17 PROBLEM — E87.20 LACTIC ACIDOSIS: Status: ACTIVE | Noted: 2023-03-17

## 2023-03-17 LAB
ANION GAP SERPL CALCULATED.3IONS-SCNC: 11 MMOL/L (ref 7–16)
APTT BLD: 31.4 SEC (ref 24.5–35.1)
APTT BLD: 72 SEC (ref 24.5–35.1)
APTT BLD: 72 SEC (ref 24.5–35.1)
B PARAP IS1001 DNA NPH QL NAA+NON-PROBE: NOT DETECTED
B PERT.PT PRMT NPH QL NAA+NON-PROBE: NOT DETECTED
BACTERIA URNS QL MICRO: ABNORMAL /HPF
BASOPHILS # BLD: 0.03 E9/L (ref 0–0.2)
BASOPHILS NFR BLD: 0.3 % (ref 0–2)
BILIRUB UR QL STRIP: NEGATIVE
BNP BLD-MCNC: 4062 PG/ML (ref 0–125)
BUN SERPL-MCNC: 15 MG/DL (ref 6–20)
C PNEUM DNA NPH QL NAA+NON-PROBE: NOT DETECTED
CALCIUM SERPL-MCNC: 8.7 MG/DL (ref 8.6–10.2)
CHLORIDE SERPL-SCNC: 106 MMOL/L (ref 98–107)
CLARITY UR: CLEAR
CO2 SERPL-SCNC: 22 MMOL/L (ref 22–29)
COLOR UR: YELLOW
CREAT SERPL-MCNC: 0.7 MG/DL (ref 0.5–1)
CRP SERPL HS-MCNC: 10.7 MG/DL (ref 0–0.4)
CRP SERPL HS-MCNC: 10.8 MG/DL (ref 0–0.4)
EKG ATRIAL RATE: 98 BPM
EKG P AXIS: 40 DEGREES
EKG P-R INTERVAL: 158 MS
EKG Q-T INTERVAL: 364 MS
EKG QRS DURATION: 82 MS
EKG QTC CALCULATION (BAZETT): 464 MS
EKG R AXIS: 38 DEGREES
EKG T AXIS: 52 DEGREES
EKG VENTRICULAR RATE: 98 BPM
EOSINOPHIL # BLD: 0.1 E9/L (ref 0.05–0.5)
EOSINOPHIL NFR BLD: 1.1 % (ref 0–6)
ERYTHROCYTE [DISTWIDTH] IN BLOOD BY AUTOMATED COUNT: 16.3 FL (ref 11.5–15)
FIBRINOGEN: 506 MG/DL (ref 200–400)
FLUAV RNA NPH QL NAA+NON-PROBE: NOT DETECTED
FLUBV RNA NPH QL NAA+NON-PROBE: NOT DETECTED
GLUCOSE SERPL-MCNC: 110 MG/DL (ref 74–99)
GLUCOSE UR STRIP-MCNC: NEGATIVE MG/DL
HADV DNA NPH QL NAA+NON-PROBE: NOT DETECTED
HCOV 229E RNA NPH QL NAA+NON-PROBE: NOT DETECTED
HCOV HKU1 RNA NPH QL NAA+NON-PROBE: NOT DETECTED
HCOV NL63 RNA NPH QL NAA+NON-PROBE: NOT DETECTED
HCOV OC43 RNA NPH QL NAA+NON-PROBE: NOT DETECTED
HCT VFR BLD AUTO: 36.3 % (ref 34–48)
HGB BLD-MCNC: 11.6 G/DL (ref 11.5–15.5)
HGB UR QL STRIP: ABNORMAL
HMPV RNA NPH QL NAA+NON-PROBE: NOT DETECTED
HPIV1 RNA NPH QL NAA+NON-PROBE: NOT DETECTED
HPIV2 RNA NPH QL NAA+NON-PROBE: NOT DETECTED
HPIV3 RNA NPH QL NAA+NON-PROBE: NOT DETECTED
HPIV4 RNA NPH QL NAA+NON-PROBE: NOT DETECTED
IMM GRANULOCYTES # BLD: 0.03 E9/L
IMM GRANULOCYTES NFR BLD: 0.3 % (ref 0–5)
INR BLD: 1.2
KETONES UR STRIP-MCNC: NEGATIVE MG/DL
LACTATE BLDV-SCNC: 1 MMOL/L (ref 0.5–2.2)
LEGIONELLA AG UR QL: NORMAL
LEUKOCYTE ESTERASE UR QL STRIP: ABNORMAL
LYMPHOCYTES # BLD: 1.94 E9/L (ref 1.5–4)
LYMPHOCYTES NFR BLD: 21.2 % (ref 20–42)
M PNEUMO DNA NPH QL NAA+NON-PROBE: NOT DETECTED
MAGNESIUM SERPL-MCNC: 2.1 MG/DL (ref 1.6–2.6)
MCH RBC QN AUTO: 28.4 PG (ref 26–35)
MCHC RBC AUTO-ENTMCNC: 32 % (ref 32–34.5)
MCV RBC AUTO: 89 FL (ref 80–99.9)
MONOCYTES # BLD: 0.54 E9/L (ref 0.1–0.95)
MONOCYTES NFR BLD: 5.9 % (ref 2–12)
NEUTROPHILS # BLD: 6.51 E9/L (ref 1.8–7.3)
NEUTS SEG NFR BLD: 71.2 % (ref 43–80)
NITRITE UR QL STRIP: NEGATIVE
PH UR STRIP: 6 [PH] (ref 5–9)
PHOSPHATE SERPL-MCNC: 4.1 MG/DL (ref 2.5–4.5)
PLATELET # BLD AUTO: 239 E9/L (ref 130–450)
PMV BLD AUTO: 9.7 FL (ref 7–12)
POTASSIUM SERPL-SCNC: 3.8 MMOL/L (ref 3.5–5)
PROCALCITONIN: 0.09 NG/ML (ref 0–0.08)
PROCALCITONIN: 0.11 NG/ML (ref 0–0.08)
PROT UR STRIP-MCNC: NEGATIVE MG/DL
PROTHROMBIN TIME: 13.9 SEC (ref 9.3–12.4)
RBC # BLD AUTO: 4.08 E12/L (ref 3.5–5.5)
RBC #/AREA URNS HPF: ABNORMAL /HPF (ref 0–2)
RSV RNA NPH QL NAA+NON-PROBE: NOT DETECTED
RV+EV RNA NPH QL NAA+NON-PROBE: NOT DETECTED
S PNEUM AG SPEC QL: NORMAL
SARS-COV-2 RNA NPH QL NAA+NON-PROBE: NOT DETECTED
SODIUM SERPL-SCNC: 139 MMOL/L (ref 132–146)
SP GR UR STRIP: 1.01 (ref 1–1.03)
TROPONIN, HIGH SENSITIVITY: 38 NG/L (ref 0–9)
TSH SERPL-MCNC: 1.79 UIU/ML (ref 0.27–4.2)
UROBILINOGEN UR STRIP-ACNC: 0.2 E.U./DL
WBC # BLD: 9.2 E9/L (ref 4.5–11.5)
WBC #/AREA URNS HPF: ABNORMAL /HPF (ref 0–5)

## 2023-03-17 PROCEDURE — 93005 ELECTROCARDIOGRAM TRACING: CPT

## 2023-03-17 PROCEDURE — 86140 C-REACTIVE PROTEIN: CPT

## 2023-03-17 PROCEDURE — 85610 PROTHROMBIN TIME: CPT

## 2023-03-17 PROCEDURE — 84145 PROCALCITONIN (PCT): CPT

## 2023-03-17 PROCEDURE — 83880 ASSAY OF NATRIURETIC PEPTIDE: CPT

## 2023-03-17 PROCEDURE — 94640 AIRWAY INHALATION TREATMENT: CPT

## 2023-03-17 PROCEDURE — 6370000000 HC RX 637 (ALT 250 FOR IP): Performed by: INTERNAL MEDICINE

## 2023-03-17 PROCEDURE — 81240 F2 GENE: CPT

## 2023-03-17 PROCEDURE — 93010 ELECTROCARDIOGRAM REPORT: CPT | Performed by: INTERNAL MEDICINE

## 2023-03-17 PROCEDURE — 6370000000 HC RX 637 (ALT 250 FOR IP)

## 2023-03-17 PROCEDURE — 2700000000 HC OXYGEN THERAPY PER DAY

## 2023-03-17 PROCEDURE — 84484 ASSAY OF TROPONIN QUANT: CPT

## 2023-03-17 PROCEDURE — 85730 THROMBOPLASTIN TIME PARTIAL: CPT

## 2023-03-17 PROCEDURE — 84443 ASSAY THYROID STIM HORMONE: CPT

## 2023-03-17 PROCEDURE — 81241 F5 GENE: CPT

## 2023-03-17 PROCEDURE — 84100 ASSAY OF PHOSPHORUS: CPT

## 2023-03-17 PROCEDURE — 80048 BASIC METABOLIC PNL TOTAL CA: CPT

## 2023-03-17 PROCEDURE — 6360000002 HC RX W HCPCS: Performed by: EMERGENCY MEDICINE

## 2023-03-17 PROCEDURE — 85303 CLOT INHIBIT PROT C ACTIVITY: CPT

## 2023-03-17 PROCEDURE — 83735 ASSAY OF MAGNESIUM: CPT

## 2023-03-17 PROCEDURE — 99233 SBSQ HOSP IP/OBS HIGH 50: CPT | Performed by: INTERNAL MEDICINE

## 2023-03-17 PROCEDURE — 85300 ANTITHROMBIN III ACTIVITY: CPT

## 2023-03-17 PROCEDURE — 2580000003 HC RX 258

## 2023-03-17 PROCEDURE — 94664 DEMO&/EVAL PT USE INHALER: CPT

## 2023-03-17 PROCEDURE — 36415 COLL VENOUS BLD VENIPUNCTURE: CPT

## 2023-03-17 PROCEDURE — 83605 ASSAY OF LACTIC ACID: CPT

## 2023-03-17 PROCEDURE — 2000000000 HC ICU R&B

## 2023-03-17 PROCEDURE — 85384 FIBRINOGEN ACTIVITY: CPT

## 2023-03-17 PROCEDURE — 85025 COMPLETE CBC W/AUTO DIFF WBC: CPT

## 2023-03-17 PROCEDURE — 36592 COLLECT BLOOD FROM PICC: CPT

## 2023-03-17 RX ORDER — ATORVASTATIN CALCIUM 40 MG/1
40 TABLET, FILM COATED ORAL NIGHTLY
Status: DISCONTINUED | OUTPATIENT
Start: 2023-03-18 | End: 2023-03-20 | Stop reason: HOSPADM

## 2023-03-17 RX ORDER — POTASSIUM CHLORIDE 20 MEQ/1
20 TABLET, EXTENDED RELEASE ORAL ONCE
Status: COMPLETED | OUTPATIENT
Start: 2023-03-17 | End: 2023-03-17

## 2023-03-17 RX ORDER — MELOXICAM 7.5 MG/1
15 TABLET ORAL DAILY
Status: DISCONTINUED | OUTPATIENT
Start: 2023-03-17 | End: 2023-03-20 | Stop reason: HOSPADM

## 2023-03-17 RX ORDER — OXYBUTYNIN CHLORIDE 5 MG/1
5 TABLET ORAL 2 TIMES DAILY
Status: DISCONTINUED | OUTPATIENT
Start: 2023-03-17 | End: 2023-03-20 | Stop reason: HOSPADM

## 2023-03-17 RX ADMIN — IPRATROPIUM BROMIDE AND ALBUTEROL SULFATE 1 AMPULE: .5; 2.5 SOLUTION RESPIRATORY (INHALATION) at 08:00

## 2023-03-17 RX ADMIN — POTASSIUM CHLORIDE 20 MEQ: 1500 TABLET, EXTENDED RELEASE ORAL at 12:18

## 2023-03-17 RX ADMIN — ARIPIPRAZOLE 5 MG: 5 TABLET ORAL at 09:05

## 2023-03-17 RX ADMIN — PANTOPRAZOLE SODIUM 40 MG: 40 TABLET, DELAYED RELEASE ORAL at 05:10

## 2023-03-17 RX ADMIN — SODIUM CHLORIDE, PRESERVATIVE FREE 10 ML: 5 INJECTION INTRAVENOUS at 09:25

## 2023-03-17 RX ADMIN — IPRATROPIUM BROMIDE AND ALBUTEROL SULFATE 1 AMPULE: .5; 2.5 SOLUTION RESPIRATORY (INHALATION) at 19:56

## 2023-03-17 RX ADMIN — ATORVASTATIN CALCIUM 40 MG: 40 TABLET, FILM COATED ORAL at 09:06

## 2023-03-17 RX ADMIN — MELOXICAM 15 MG: 7.5 TABLET ORAL at 12:19

## 2023-03-17 RX ADMIN — OXYBUTYNIN CHLORIDE 5 MG: 5 TABLET ORAL at 12:19

## 2023-03-17 RX ADMIN — IPRATROPIUM BROMIDE AND ALBUTEROL SULFATE 1 AMPULE: .5; 2.5 SOLUTION RESPIRATORY (INHALATION) at 12:04

## 2023-03-17 RX ADMIN — IPRATROPIUM BROMIDE AND ALBUTEROL SULFATE 1 AMPULE: .5; 2.5 SOLUTION RESPIRATORY (INHALATION) at 16:04

## 2023-03-17 RX ADMIN — SODIUM CHLORIDE, POTASSIUM CHLORIDE, SODIUM LACTATE AND CALCIUM CHLORIDE: 600; 310; 30; 20 INJECTION, SOLUTION INTRAVENOUS at 13:27

## 2023-03-17 RX ADMIN — DULOXETINE HYDROCHLORIDE 60 MG: 60 CAPSULE, DELAYED RELEASE ORAL at 09:06

## 2023-03-17 RX ADMIN — OXYBUTYNIN CHLORIDE 5 MG: 5 TABLET ORAL at 21:25

## 2023-03-17 RX ADMIN — HEPARIN SODIUM 18 UNITS/KG/HR: 10000 INJECTION, SOLUTION INTRAVENOUS at 13:30

## 2023-03-17 NOTE — ED PROVIDER NOTES
Dr. Dayne Olmedo MD to Dr Duyen Acuna on   3/16/2023 at 17:54. Final   1. Moderate volume bilateral pulmonary emboli with right heart strain. Main   pulmonary artery is minimally dilated at 3.6 cm. Ground-glass opacity in the   right upper lobe may represent early pulmonary infarction. No pleural fluid. 2.  Dilatation of the ascending aorta measuring up to 4.3 cm in diameter. CTA PULMONARY W CONTRAST    Addendum Date: 3/16/2023    ADDENDUM: Critical results were called by Dr. Dayne Olmedo MD to Dr Duyen Acuna on 3/16/2023 at 17:54. Result Date: 3/16/2023  EXAMINATION: CTA OF THE CHEST 3/16/2023 5:03 pm TECHNIQUE: CTA of the chest was performed after the administration of intravenous contrast.  Multiplanar reformatted images are provided for review. MIP images are provided for review. Automated exposure control, iterative reconstruction, and/or weight based adjustment of the mA/kV was utilized to reduce the radiation dose to as low as reasonably achievable. COMPARISON: None. HISTORY: ORDERING SYSTEM PROVIDED HISTORY: pe rule out TECHNOLOGIST PROVIDED HISTORY: Reason for exam:->pe rule out Decision Support Exception - unselect if not a suspected or confirmed emergency medical condition->Emergency Medical Condition (MA) FINDINGS: Pulmonary Arteries: Pulmonary artery is adequately enhanced for evaluation. There are moderate volume bilateral partially occlusive thrombi in the lobar and segmental pulmonary arteries bilaterally in upper and lower lobes. There is evidence of right heart strain. Main pulmonary artery measures 3.6 cm in diameter Mediastinum: Ascending aorta is dilated measuring up to 4.3 cm in diameter. No evidence of mediastinal adenopathy. Right heart is mildly dilated. Left heart appears normal. Lungs/pleura: Lungs demonstrate minimal dependent atelectasis.   Slight ground-glass opacity in the upper lobe on the right anteriorly likely reflects early changes of pulmonary

## 2023-03-17 NOTE — INTERDISCIPLINARY ROUNDS
Intensive Care Daily Quality Rounding Checklist      ICU Team Members: Dr. Ector Landry, aVnesa Cronin (residents), clinical pharmacist, respiratory therapist, charge nurse, bedside nurse    ICU Day #: NUMBER: 2    Intubation Date: N/A    Ventilator Day #: N/A    Central Line Insertion Date: March 16        Day #: NUMBER: 2        Indication: CVCIndication: Hemodynamically unstable requiring volume resuscitation     Arterial Line Insertion Date: N/A      Day #: N/A    Temporary Hemodialysis Catheter Insertion Date: N/A      Day # N/A    DVT Prophylaxis: Heparin gtt    GI Prophylaxis: Protonix    Albert Catheter Insertion Date: N/A       Day #: N/A      Indications: N/A      Continued need (if yes, reason documented and discussed with physician): N/A    Skin Issues/ Wounds and ordered treatment discussed on rounds: No issues    Goals/ Plans for the Day: Daily labs, continue Heparin drip, change to oral anticoagulation tomorrow, wean O2 as able

## 2023-03-17 NOTE — CARE COORDINATION
Social Work discharge planning    SW consult \"home needs, anticoagulation  ? o2-- PT /OT +debilation noted\" noted. Pt may need PT OT evals ordered once medically appropriate please. Sw attempted to meet pt this am in ICU, but she was resting. Pt on phone now. Gina Siddiqi / Gardenia Lindsay will need to talk to pt for discharge planning needs.    Electronically signed by Geovanny Aparicio on 3/17/2023 at 3:26 PM

## 2023-03-17 NOTE — H&P
spent coordinating care between [facility], ER and floor nurses, and transitioning care over to day providers  Plan of care/ clinical impressions/communication specifics detailed below:  62 y.o. female    - hx of recurrent UTIs- on abx intermittently, no estrogen etc..    Chronicallly noted very mobile, debiliated  No hx of blood clots, etc... Reportedly coughing productive over past 2 weeks with sinus drainage, chest congestion, no fevers/chills- no N/V no new sorethroat  then acutely worsening SOB over past 3- 4 days  Severe WEAVER on even a few steps  No chest pain, jsut can't catch breathe    No prior o2  O2 sat in ER 81% RA  Inc HR in ER  Dx B/l PE in ER, feeling less SOB on o2 in ER and while at rest     -rule out provoked PE from covid or other URI  - check respiratory panel  Also workup pneumonia, inc WBC    Acute hypoxic respiratory failure - now on high flow o2- NRB- changed to NC o2     Pulm infarction  +ground glass rule out viral infection  Acute Bilateral PE-- CTA  Moderate volume bilateral pulmonary emboli with right heart strain. Main pulmonary artery is minimally dilated at 3.6 cm. Ground-glass opacity in the right upper lobe may represent early pulmonary infarction. With right heart strain on CT and echo-   Plan half dose TPA on 3/16- then heparin - plan ICU  Echo:  Echo findings suggestine of Right ventricular strain. Right ventricle dilated with severe hypokinesis. Mild Pulmonary hypertension, RVSP 42mmHg. Trop 43 and 42  +inc bnp  Ekg changes rule out strain to heart, type 2 myocardial strain vs R heart strain  Echo L side good EF  Hx of \"heart issue\"-- per cardio note \"History of atrial septal aneurysm without any shunts. \"  But on current echo, no intraatrial abnormalities or thrombus  Per dr Jared Balderas:  \"Chronic dyspnea which is multifactorial.  Echocardiogram showed normal LV systolic and diastolic function and no evidence of pulmonary hypertension.   Stress test in 2019 was normal.

## 2023-03-17 NOTE — CONSULTS
PM    LACTA 2.1 04/09/2019 10:14 AM        BNP   No results for input(s): BNP in the last 72 hours. Cultures     No results for input(s): BC in the last 72 hours. No results for input(s): Pixie Presume in the last 72 hours. No results for input(s): LABURIN in the last 72 hours. Radiology   CXR      CTA Pulmonary with Contrast    Impression   1. Moderate volume bilateral pulmonary emboli with right heart strain. Main   pulmonary artery is minimally dilated at 3.6 cm. Ground-glass opacity in the   right upper lobe may represent early pulmonary infarction. No pleural fluid. 2.  Dilatation of the ascending aorta measuring up to 4.3 cm in diameter. US Venous Doppler Lower Extremity    Impression   No evidence of DVT in either lower extremity. ECHO   Summary   Echo findings suggestine of Right ventricular strain. Right ventricle dilated with severe hypokinesis. Mild Pulmonary hypertension, RVSP 42mmHg. Abnormal septal motion with normal LV systolic function, EF 80%. Normal left ventricular chamber size. Mild left ventricular concentric hypertrophy noted. Normal diastolic function. Interatrial septum appears intact. No mitral valve prolapse. Normal aortic valve structure. No significant aortic stenosis present. Trace of pericardial effusion. Inferior vena cava normal size with decreased respiratory variation. No intra cardiac mass or thrombus. Prior sudy done 6/1/2018. EKG  Normal sinus rhythm  Possible Inferior infarct , age undetermined  T wave abnormality, consider anterior ischemia  Abnormal ECG  When compared with ECG of 10-OCT-2019 15:52,  Vent.  rate has increased BY  47 BPM  Nonspecific T wave abnormality, worse in Inferior leads  T wave inversion now evident in Anterior leads  QT has lengthened      SYSTEMS ASSESSMENT    Neuro   # Depression/Anxiety  - Continue Abilify and Cymbalta    Respiratory   # Acute hypoxic respiratory failure  - 2/2 acute pulmonary

## 2023-03-18 LAB
ANION GAP SERPL CALCULATED.3IONS-SCNC: 10 MMOL/L (ref 7–16)
APTT BLD: 70.2 SEC (ref 24.5–35.1)
BASOPHILS # BLD: 0.03 E9/L (ref 0–0.2)
BASOPHILS NFR BLD: 0.5 % (ref 0–2)
BUN SERPL-MCNC: 14 MG/DL (ref 6–20)
CALCIUM SERPL-MCNC: 8.5 MG/DL (ref 8.6–10.2)
CHLORIDE SERPL-SCNC: 105 MMOL/L (ref 98–107)
CO2 SERPL-SCNC: 23 MMOL/L (ref 22–29)
CREAT SERPL-MCNC: 0.7 MG/DL (ref 0.5–1)
EKG ATRIAL RATE: 86 BPM
EKG P AXIS: 30 DEGREES
EKG P-R INTERVAL: 156 MS
EKG Q-T INTERVAL: 384 MS
EKG QRS DURATION: 84 MS
EKG QTC CALCULATION (BAZETT): 459 MS
EKG R AXIS: 40 DEGREES
EKG T AXIS: 17 DEGREES
EKG VENTRICULAR RATE: 86 BPM
EOSINOPHIL # BLD: 0.11 E9/L (ref 0.05–0.5)
EOSINOPHIL NFR BLD: 1.7 % (ref 0–6)
ERYTHROCYTE [DISTWIDTH] IN BLOOD BY AUTOMATED COUNT: 16.5 FL (ref 11.5–15)
GLUCOSE SERPL-MCNC: 125 MG/DL (ref 74–99)
HAPTOGLOB SERPL-MCNC: 300 MG/DL (ref 30–200)
HCT VFR BLD AUTO: 30 % (ref 34–48)
HGB BLD-MCNC: 10.1 G/DL (ref 11.5–15.5)
HGB BLD-MCNC: 9.4 G/DL (ref 11.5–15.5)
IMM GRANULOCYTES # BLD: 0.03 E9/L
IMM GRANULOCYTES NFR BLD: 0.5 % (ref 0–5)
LDH SERPL-CCNC: 175 U/L (ref 135–214)
LYMPHOCYTES # BLD: 1.44 E9/L (ref 1.5–4)
LYMPHOCYTES NFR BLD: 22.3 % (ref 20–42)
MAGNESIUM SERPL-MCNC: 2 MG/DL (ref 1.6–2.6)
MCH RBC QN AUTO: 28.5 PG (ref 26–35)
MCHC RBC AUTO-ENTMCNC: 31.3 % (ref 32–34.5)
MCV RBC AUTO: 90.9 FL (ref 80–99.9)
MONOCYTES # BLD: 0.35 E9/L (ref 0.1–0.95)
MONOCYTES NFR BLD: 5.4 % (ref 2–12)
MRSA SPEC QL CULT: NORMAL
NEUTROPHILS # BLD: 4.5 E9/L (ref 1.8–7.3)
NEUTS SEG NFR BLD: 69.6 % (ref 43–80)
PHOSPHATE SERPL-MCNC: 3.4 MG/DL (ref 2.5–4.5)
PLATELET # BLD AUTO: 188 E9/L (ref 130–450)
PMV BLD AUTO: 9.8 FL (ref 7–12)
POTASSIUM SERPL-SCNC: 4 MMOL/L (ref 3.5–5)
RBC # BLD AUTO: 3.3 E12/L (ref 3.5–5.5)
SODIUM SERPL-SCNC: 138 MMOL/L (ref 132–146)
WBC # BLD: 6.5 E9/L (ref 4.5–11.5)

## 2023-03-18 PROCEDURE — 2580000003 HC RX 258

## 2023-03-18 PROCEDURE — 6370000000 HC RX 637 (ALT 250 FOR IP)

## 2023-03-18 PROCEDURE — 6370000000 HC RX 637 (ALT 250 FOR IP): Performed by: INTERNAL MEDICINE

## 2023-03-18 PROCEDURE — 85025 COMPLETE CBC W/AUTO DIFF WBC: CPT

## 2023-03-18 PROCEDURE — 83010 ASSAY OF HAPTOGLOBIN QUANT: CPT

## 2023-03-18 PROCEDURE — 80048 BASIC METABOLIC PNL TOTAL CA: CPT

## 2023-03-18 PROCEDURE — 83615 LACTATE (LD) (LDH) ENZYME: CPT

## 2023-03-18 PROCEDURE — 6360000002 HC RX W HCPCS: Performed by: EMERGENCY MEDICINE

## 2023-03-18 PROCEDURE — 2700000000 HC OXYGEN THERAPY PER DAY

## 2023-03-18 PROCEDURE — 84100 ASSAY OF PHOSPHORUS: CPT

## 2023-03-18 PROCEDURE — 99233 SBSQ HOSP IP/OBS HIGH 50: CPT | Performed by: INTERNAL MEDICINE

## 2023-03-18 PROCEDURE — 83735 ASSAY OF MAGNESIUM: CPT

## 2023-03-18 PROCEDURE — 2060000000 HC ICU INTERMEDIATE R&B

## 2023-03-18 PROCEDURE — 85730 THROMBOPLASTIN TIME PARTIAL: CPT

## 2023-03-18 PROCEDURE — 85018 HEMOGLOBIN: CPT

## 2023-03-18 PROCEDURE — 36415 COLL VENOUS BLD VENIPUNCTURE: CPT

## 2023-03-18 PROCEDURE — 94640 AIRWAY INHALATION TREATMENT: CPT

## 2023-03-18 PROCEDURE — 93010 ELECTROCARDIOGRAM REPORT: CPT | Performed by: INTERNAL MEDICINE

## 2023-03-18 RX ADMIN — SODIUM CHLORIDE, PRESERVATIVE FREE 10 ML: 5 INJECTION INTRAVENOUS at 20:47

## 2023-03-18 RX ADMIN — DULOXETINE HYDROCHLORIDE 60 MG: 60 CAPSULE, DELAYED RELEASE ORAL at 09:07

## 2023-03-18 RX ADMIN — HEPARIN SODIUM 18 UNITS/KG/HR: 10000 INJECTION, SOLUTION INTRAVENOUS at 03:24

## 2023-03-18 RX ADMIN — PANTOPRAZOLE SODIUM 40 MG: 40 TABLET, DELAYED RELEASE ORAL at 06:22

## 2023-03-18 RX ADMIN — IPRATROPIUM BROMIDE AND ALBUTEROL SULFATE 1 AMPULE: .5; 2.5 SOLUTION RESPIRATORY (INHALATION) at 07:55

## 2023-03-18 RX ADMIN — OXYBUTYNIN CHLORIDE 5 MG: 5 TABLET ORAL at 20:47

## 2023-03-18 RX ADMIN — OXYBUTYNIN CHLORIDE 5 MG: 5 TABLET ORAL at 09:07

## 2023-03-18 RX ADMIN — ATORVASTATIN CALCIUM 40 MG: 40 TABLET, FILM COATED ORAL at 20:47

## 2023-03-18 RX ADMIN — IPRATROPIUM BROMIDE AND ALBUTEROL SULFATE 1 AMPULE: .5; 2.5 SOLUTION RESPIRATORY (INHALATION) at 12:18

## 2023-03-18 RX ADMIN — HEPARIN SODIUM 18 UNITS/KG/HR: 10000 INJECTION, SOLUTION INTRAVENOUS at 17:36

## 2023-03-18 RX ADMIN — IPRATROPIUM BROMIDE AND ALBUTEROL SULFATE 1 AMPULE: .5; 2.5 SOLUTION RESPIRATORY (INHALATION) at 16:17

## 2023-03-18 RX ADMIN — IPRATROPIUM BROMIDE AND ALBUTEROL SULFATE 1 AMPULE: .5; 2.5 SOLUTION RESPIRATORY (INHALATION) at 20:52

## 2023-03-18 RX ADMIN — SODIUM CHLORIDE, POTASSIUM CHLORIDE, SODIUM LACTATE AND CALCIUM CHLORIDE: 600; 310; 30; 20 INJECTION, SOLUTION INTRAVENOUS at 01:47

## 2023-03-18 RX ADMIN — MELOXICAM 15 MG: 7.5 TABLET ORAL at 09:07

## 2023-03-19 LAB
ANION GAP SERPL CALCULATED.3IONS-SCNC: 8 MMOL/L (ref 7–16)
APTT BLD: 95.5 SEC (ref 24.5–35.1)
AT-III ACTIVITY: 85 % ACTIVITY (ref 83–121)
BACTERIA UR CULT: ABNORMAL
BACTERIA UR CULT: ABNORMAL
BASOPHILS # BLD: 0.02 E9/L (ref 0–0.2)
BASOPHILS NFR BLD: 0.3 % (ref 0–2)
BUN SERPL-MCNC: 10 MG/DL (ref 6–20)
CALCIUM SERPL-MCNC: 8.8 MG/DL (ref 8.6–10.2)
CHLORIDE SERPL-SCNC: 104 MMOL/L (ref 98–107)
CO2 SERPL-SCNC: 26 MMOL/L (ref 22–29)
CREAT SERPL-MCNC: 0.7 MG/DL (ref 0.5–1)
EOSINOPHIL # BLD: 0.14 E9/L (ref 0.05–0.5)
EOSINOPHIL NFR BLD: 1.9 % (ref 0–6)
ERYTHROCYTE [DISTWIDTH] IN BLOOD BY AUTOMATED COUNT: 16.3 FL (ref 11.5–15)
GLUCOSE SERPL-MCNC: 116 MG/DL (ref 74–99)
HCT VFR BLD AUTO: 31.8 % (ref 34–48)
HGB BLD-MCNC: 10 G/DL (ref 11.5–15.5)
IMM GRANULOCYTES # BLD: 0.05 E9/L
IMM GRANULOCYTES NFR BLD: 0.7 % (ref 0–5)
LYMPHOCYTES # BLD: 1.54 E9/L (ref 1.5–4)
LYMPHOCYTES NFR BLD: 20.8 % (ref 20–42)
MAGNESIUM SERPL-MCNC: 2.2 MG/DL (ref 1.6–2.6)
MCH RBC QN AUTO: 28.1 PG (ref 26–35)
MCHC RBC AUTO-ENTMCNC: 31.4 % (ref 32–34.5)
MCV RBC AUTO: 89.3 FL (ref 80–99.9)
MONOCYTES # BLD: 0.36 E9/L (ref 0.1–0.95)
MONOCYTES NFR BLD: 4.9 % (ref 2–12)
NEUTROPHILS # BLD: 5.29 E9/L (ref 1.8–7.3)
NEUTS SEG NFR BLD: 71.4 % (ref 43–80)
ORGANISM: ABNORMAL
PHOSPHATE SERPL-MCNC: 3.3 MG/DL (ref 2.5–4.5)
PLATELET # BLD AUTO: 201 E9/L (ref 130–450)
PMV BLD AUTO: 9.7 FL (ref 7–12)
POTASSIUM SERPL-SCNC: 3.9 MMOL/L (ref 3.5–5)
PROTEIN C ACTIVITY: 119 % ACTIVITY (ref 68–165)
RBC # BLD AUTO: 3.56 E12/L (ref 3.5–5.5)
SODIUM SERPL-SCNC: 138 MMOL/L (ref 132–146)
WBC # BLD: 7.4 E9/L (ref 4.5–11.5)

## 2023-03-19 PROCEDURE — 85730 THROMBOPLASTIN TIME PARTIAL: CPT

## 2023-03-19 PROCEDURE — 2060000000 HC ICU INTERMEDIATE R&B

## 2023-03-19 PROCEDURE — 6370000000 HC RX 637 (ALT 250 FOR IP): Performed by: INTERNAL MEDICINE

## 2023-03-19 PROCEDURE — 83735 ASSAY OF MAGNESIUM: CPT

## 2023-03-19 PROCEDURE — 85025 COMPLETE CBC W/AUTO DIFF WBC: CPT

## 2023-03-19 PROCEDURE — 94640 AIRWAY INHALATION TREATMENT: CPT

## 2023-03-19 PROCEDURE — 6370000000 HC RX 637 (ALT 250 FOR IP)

## 2023-03-19 PROCEDURE — 84100 ASSAY OF PHOSPHORUS: CPT

## 2023-03-19 PROCEDURE — 99233 SBSQ HOSP IP/OBS HIGH 50: CPT | Performed by: INTERNAL MEDICINE

## 2023-03-19 PROCEDURE — 80048 BASIC METABOLIC PNL TOTAL CA: CPT

## 2023-03-19 PROCEDURE — 2700000000 HC OXYGEN THERAPY PER DAY

## 2023-03-19 PROCEDURE — 36415 COLL VENOUS BLD VENIPUNCTURE: CPT

## 2023-03-19 PROCEDURE — 2580000003 HC RX 258

## 2023-03-19 RX ADMIN — ATORVASTATIN CALCIUM 40 MG: 40 TABLET, FILM COATED ORAL at 20:18

## 2023-03-19 RX ADMIN — PANTOPRAZOLE SODIUM 40 MG: 40 TABLET, DELAYED RELEASE ORAL at 05:46

## 2023-03-19 RX ADMIN — IPRATROPIUM BROMIDE AND ALBUTEROL SULFATE 1 AMPULE: .5; 2.5 SOLUTION RESPIRATORY (INHALATION) at 09:14

## 2023-03-19 RX ADMIN — MELOXICAM 15 MG: 7.5 TABLET ORAL at 07:53

## 2023-03-19 RX ADMIN — APIXABAN 10 MG: 5 TABLET, FILM COATED ORAL at 20:18

## 2023-03-19 RX ADMIN — APIXABAN 10 MG: 5 TABLET, FILM COATED ORAL at 06:30

## 2023-03-19 RX ADMIN — IPRATROPIUM BROMIDE AND ALBUTEROL SULFATE 1 AMPULE: .5; 2.5 SOLUTION RESPIRATORY (INHALATION) at 20:57

## 2023-03-19 RX ADMIN — IPRATROPIUM BROMIDE AND ALBUTEROL SULFATE 1 AMPULE: .5; 2.5 SOLUTION RESPIRATORY (INHALATION) at 12:52

## 2023-03-19 RX ADMIN — OXYBUTYNIN CHLORIDE 5 MG: 5 TABLET ORAL at 20:18

## 2023-03-19 RX ADMIN — IPRATROPIUM BROMIDE AND ALBUTEROL SULFATE 1 AMPULE: .5; 2.5 SOLUTION RESPIRATORY (INHALATION) at 16:19

## 2023-03-19 RX ADMIN — SODIUM CHLORIDE, PRESERVATIVE FREE 10 ML: 5 INJECTION INTRAVENOUS at 07:53

## 2023-03-19 RX ADMIN — SODIUM CHLORIDE, PRESERVATIVE FREE 10 ML: 5 INJECTION INTRAVENOUS at 20:18

## 2023-03-19 RX ADMIN — DULOXETINE HYDROCHLORIDE 60 MG: 60 CAPSULE, DELAYED RELEASE ORAL at 07:53

## 2023-03-19 RX ADMIN — OXYBUTYNIN CHLORIDE 5 MG: 5 TABLET ORAL at 07:53

## 2023-03-19 NOTE — PLAN OF CARE
Problem: Discharge Planning  Goal: Discharge to home or other facility with appropriate resources  3/17/2023 1547 by Jessica Gilliam RN  Outcome: Progressing  Flowsheets (Taken 3/17/2023 0800)  Discharge to home or other facility with appropriate resources: Identify barriers to discharge with patient and caregiver  3/17/2023 0530 by Thai Watkins RN  Outcome: Progressing     Problem: Safety - Adult  Goal: Free from fall injury  3/17/2023 1547 by Jessica Gilliam RN  Outcome: Progressing  Flowsheets (Taken 3/17/2023 1542)  Free From Fall Injury: Instruct family/caregiver on patient safety  3/17/2023 0530 by Thai Watkins RN  Outcome: Progressing     Problem: ABCDS Injury Assessment  Goal: Absence of physical injury  3/17/2023 1547 by Jessica Gilliam RN  Outcome: Progressing  Flowsheets (Taken 3/17/2023 1542)  Absence of Physical Injury: Implement safety measures based on patient assessment  3/17/2023 0530 by Thai Watkins RN  Outcome: Progressing     Problem: Chronic Conditions and Co-morbidities  Goal: Patient's chronic conditions and co-morbidity symptoms are monitored and maintained or improved  Outcome: Progressing  Flowsheets (Taken 3/17/2023 0800)  Care Plan - Patient's Chronic Conditions and Co-Morbidity Symptoms are Monitored and Maintained or Improved: Monitor and assess patient's chronic conditions and comorbid symptoms for stability, deterioration, or improvement     Problem: Skin/Tissue Integrity  Goal: Absence of new skin breakdown  Description: 1. Monitor for areas of redness and/or skin breakdown  2. Assess vascular access sites hourly  3. Every 4-6 hours minimum:  Change oxygen saturation probe site  4. Every 4-6 hours:  If on nasal continuous positive airway pressure, respiratory therapy assess nares and determine need for appliance change or resting period.   Outcome: Progressing
Problem: Discharge Planning  Goal: Discharge to home or other facility with appropriate resources  3/18/2023 1020 by Cindy Rock RN  Outcome: Progressing  3/18/2023 0052 by Vi Arnold RN  Outcome: Progressing     Problem: Safety - Adult  Goal: Free from fall injury  3/18/2023 1020 by Cindy Rock RN  Outcome: Progressing  3/18/2023 0052 by Vi Arnold RN  Outcome: Progressing     Problem: ABCDS Injury Assessment  Goal: Absence of physical injury  3/18/2023 1020 by Cindy Rock RN  Outcome: Progressing  3/18/2023 0052 by Vi Arnold RN  Outcome: Progressing     Problem: Chronic Conditions and Co-morbidities  Goal: Patient's chronic conditions and co-morbidity symptoms are monitored and maintained or improved  3/18/2023 1020 by Cindy Rock RN  Outcome: Progressing  3/18/2023 0052 by Vi Arnold RN  Outcome: Progressing     Problem: Skin/Tissue Integrity  Goal: Absence of new skin breakdown  Description: 1. Monitor for areas of redness and/or skin breakdown  2. Assess vascular access sites hourly  3. Every 4-6 hours minimum:  Change oxygen saturation probe site  4. Every 4-6 hours:  If on nasal continuous positive airway pressure, respiratory therapy assess nares and determine need for appliance change or resting period.   3/18/2023 1020 by Cindy Rock RN  Outcome: Progressing  3/18/2023 0052 by Vi Arnold RN  Outcome: Progressing
Problem: Discharge Planning  Goal: Discharge to home or other facility with appropriate resources  Outcome: Progressing     Problem: Safety - Adult  Goal: Free from fall injury  Outcome: Progressing     Problem: ABCDS Injury Assessment  Goal: Absence of physical injury  Outcome: Progressing
Problem: Discharge Planning  Goal: Discharge to home or other facility with appropriate resources  Outcome: Progressing     Problem: Safety - Adult  Goal: Free from fall injury  Outcome: Progressing     Problem: ABCDS Injury Assessment  Goal: Absence of physical injury  Outcome: Progressing     Problem: Chronic Conditions and Co-morbidities  Goal: Patient's chronic conditions and co-morbidity symptoms are monitored and maintained or improved  Outcome: Progressing     Problem: Skin/Tissue Integrity  Goal: Absence of new skin breakdown  Description: 1. Monitor for areas of redness and/or skin breakdown  2. Assess vascular access sites hourly  3. Every 4-6 hours minimum:  Change oxygen saturation probe site  4. Every 4-6 hours:  If on nasal continuous positive airway pressure, respiratory therapy assess nares and determine need for appliance change or resting period.   Outcome: Progressing
period.   3/18/2023 0052 by Lucio Brown RN  Outcome: Progressing  3/17/2023 1547 by Atul Jackson RN  Outcome: Progressing

## 2023-03-20 VITALS
RESPIRATION RATE: 18 BRPM | HEIGHT: 64 IN | HEART RATE: 100 BPM | OXYGEN SATURATION: 97 % | BODY MASS INDEX: 40.16 KG/M2 | SYSTOLIC BLOOD PRESSURE: 147 MMHG | WEIGHT: 235.23 LBS | TEMPERATURE: 97.8 F | DIASTOLIC BLOOD PRESSURE: 83 MMHG

## 2023-03-20 PROBLEM — I51.7 RIGHT HEART ENLARGEMENT: Status: ACTIVE | Noted: 2023-03-20

## 2023-03-20 PROBLEM — I10 PRIMARY HYPERTENSION: Status: ACTIVE | Noted: 2023-03-20

## 2023-03-20 PROBLEM — Z92.82 S/P ADMN TPA IN DIFF FAC W/N LAST 24 HR BEF ADM TO CRNT FAC: Status: ACTIVE | Noted: 2023-03-20

## 2023-03-20 PROBLEM — D64.9 ANEMIA: Status: ACTIVE | Noted: 2023-03-20

## 2023-03-20 PROBLEM — R82.71 ASYMPTOMATIC BACTERIURIA: Status: ACTIVE | Noted: 2023-03-20

## 2023-03-20 LAB
ANION GAP SERPL CALCULATED.3IONS-SCNC: 10 MMOL/L (ref 7–16)
APTT BLD: 40.8 SEC (ref 24.5–35.1)
BASOPHILS # BLD: 0.03 E9/L (ref 0–0.2)
BASOPHILS NFR BLD: 0.4 % (ref 0–2)
BUN SERPL-MCNC: 12 MG/DL (ref 6–20)
CALCIUM SERPL-MCNC: 9.4 MG/DL (ref 8.6–10.2)
CHLORIDE SERPL-SCNC: 104 MMOL/L (ref 98–107)
CO2 SERPL-SCNC: 25 MMOL/L (ref 22–29)
CREAT SERPL-MCNC: 0.7 MG/DL (ref 0.5–1)
EOSINOPHIL # BLD: 0.16 E9/L (ref 0.05–0.5)
EOSINOPHIL NFR BLD: 2 % (ref 0–6)
ERYTHROCYTE [DISTWIDTH] IN BLOOD BY AUTOMATED COUNT: 16.5 FL (ref 11.5–15)
GLUCOSE SERPL-MCNC: 132 MG/DL (ref 74–99)
HCT VFR BLD AUTO: 35.3 % (ref 34–48)
HGB BLD-MCNC: 11.3 G/DL (ref 11.5–15.5)
IMM GRANULOCYTES # BLD: 0.04 E9/L
IMM GRANULOCYTES NFR BLD: 0.5 % (ref 0–5)
LV EF: 60 %
LVEF MODALITY: NORMAL
LYMPHOCYTES # BLD: 1.32 E9/L (ref 1.5–4)
LYMPHOCYTES NFR BLD: 16.3 % (ref 20–42)
MAGNESIUM SERPL-MCNC: 2.3 MG/DL (ref 1.6–2.6)
MCH RBC QN AUTO: 28.8 PG (ref 26–35)
MCHC RBC AUTO-ENTMCNC: 32 % (ref 32–34.5)
MCV RBC AUTO: 89.8 FL (ref 80–99.9)
MONOCYTES # BLD: 0.41 E9/L (ref 0.1–0.95)
MONOCYTES NFR BLD: 5.1 % (ref 2–12)
NEUTROPHILS # BLD: 6.12 E9/L (ref 1.8–7.3)
NEUTS SEG NFR BLD: 75.7 % (ref 43–80)
PHOSPHATE SERPL-MCNC: 4.3 MG/DL (ref 2.5–4.5)
PLATELET # BLD AUTO: 259 E9/L (ref 130–450)
PMV BLD AUTO: 10.1 FL (ref 7–12)
POTASSIUM SERPL-SCNC: 4 MMOL/L (ref 3.5–5)
RBC # BLD AUTO: 3.93 E12/L (ref 3.5–5.5)
SODIUM SERPL-SCNC: 139 MMOL/L (ref 132–146)
WBC # BLD: 8.1 E9/L (ref 4.5–11.5)

## 2023-03-20 PROCEDURE — 36415 COLL VENOUS BLD VENIPUNCTURE: CPT

## 2023-03-20 PROCEDURE — 6370000000 HC RX 637 (ALT 250 FOR IP)

## 2023-03-20 PROCEDURE — 93308 TTE F-UP OR LMTD: CPT

## 2023-03-20 PROCEDURE — 6370000000 HC RX 637 (ALT 250 FOR IP): Performed by: INTERNAL MEDICINE

## 2023-03-20 PROCEDURE — 99239 HOSP IP/OBS DSCHRG MGMT >30: CPT | Performed by: INTERNAL MEDICINE

## 2023-03-20 PROCEDURE — 85025 COMPLETE CBC W/AUTO DIFF WBC: CPT

## 2023-03-20 PROCEDURE — 83735 ASSAY OF MAGNESIUM: CPT

## 2023-03-20 PROCEDURE — 2580000003 HC RX 258

## 2023-03-20 PROCEDURE — 84100 ASSAY OF PHOSPHORUS: CPT

## 2023-03-20 PROCEDURE — 94640 AIRWAY INHALATION TREATMENT: CPT

## 2023-03-20 PROCEDURE — 85730 THROMBOPLASTIN TIME PARTIAL: CPT

## 2023-03-20 PROCEDURE — 80048 BASIC METABOLIC PNL TOTAL CA: CPT

## 2023-03-20 RX ADMIN — MELOXICAM 15 MG: 7.5 TABLET ORAL at 08:33

## 2023-03-20 RX ADMIN — APIXABAN 10 MG: 5 TABLET, FILM COATED ORAL at 06:30

## 2023-03-20 RX ADMIN — ATORVASTATIN CALCIUM 40 MG: 40 TABLET, FILM COATED ORAL at 20:07

## 2023-03-20 RX ADMIN — IPRATROPIUM BROMIDE AND ALBUTEROL SULFATE 1 AMPULE: .5; 2.5 SOLUTION RESPIRATORY (INHALATION) at 08:19

## 2023-03-20 RX ADMIN — PANTOPRAZOLE SODIUM 40 MG: 40 TABLET, DELAYED RELEASE ORAL at 05:31

## 2023-03-20 RX ADMIN — IPRATROPIUM BROMIDE AND ALBUTEROL SULFATE 1 AMPULE: .5; 2.5 SOLUTION RESPIRATORY (INHALATION) at 15:26

## 2023-03-20 RX ADMIN — OXYBUTYNIN CHLORIDE 5 MG: 5 TABLET ORAL at 20:06

## 2023-03-20 RX ADMIN — DULOXETINE HYDROCHLORIDE 60 MG: 60 CAPSULE, DELAYED RELEASE ORAL at 08:32

## 2023-03-20 RX ADMIN — OXYBUTYNIN CHLORIDE 5 MG: 5 TABLET ORAL at 08:34

## 2023-03-20 RX ADMIN — IPRATROPIUM BROMIDE AND ALBUTEROL SULFATE 1 AMPULE: .5; 2.5 SOLUTION RESPIRATORY (INHALATION) at 12:15

## 2023-03-20 RX ADMIN — SODIUM CHLORIDE, PRESERVATIVE FREE 10 ML: 5 INJECTION INTRAVENOUS at 08:36

## 2023-03-20 RX ADMIN — ACETAMINOPHEN 650 MG: 325 TABLET ORAL at 14:04

## 2023-03-20 RX ADMIN — APIXABAN 10 MG: 5 TABLET, FILM COATED ORAL at 20:07

## 2023-03-20 ASSESSMENT — PAIN SCALES - GENERAL
PAINLEVEL_OUTOF10: 0
PAINLEVEL_OUTOF10: 0
PAINLEVEL_OUTOF10: 7

## 2023-03-20 ASSESSMENT — PAIN DESCRIPTION - DESCRIPTORS: DESCRIPTORS: ACHING;DISCOMFORT

## 2023-03-20 ASSESSMENT — PAIN DESCRIPTION - ORIENTATION: ORIENTATION: MID

## 2023-03-20 ASSESSMENT — PAIN DESCRIPTION - LOCATION: LOCATION: HEAD

## 2023-03-20 NOTE — CARE COORDINATION
Social Work / Discharge Planning : Patient plan is HOME where she is independent. Patient now on RA. Patient has established PCP and insurance. Home after Echo is ok, plan HOME. Anticipate no needs for SW. SW to follow.  Electronically signed by MIKA Landin on 3/20/23 at 9:05 AM EDT

## 2023-03-21 NOTE — PROGRESS NOTES
2 d echo completed as a stat per Dr Jerrod Escobar  along with 2 cc definity given by the rn Uriel Burroughs
Call placed to Dr. Sierra Brewer re: IV fluids. Pt eating and drinking. See new order.
[facility] Progress Note    --------------------------------------------------------------------------------------  Assessment / Plan  Bilateral submassive pulmonary emboli with acute hypoxia, right heart strain, and evidence of pulmonary infarction - questionably provoked as the patient has been relatively sedentary. CTA showed b/l PE w bedside echo in ED showing dilated RV. Started on heparin gtt, s/p tPA, and admitted to ICU w plans [place] [facility] [place] Needed 10L on admission but now down to 2. Hx HTN, HPL    Anticipated disposition  Home when ready  Anticipated needs on discharge None  Anticipated length of stay  ~24-48h  Code status    Full Code  DVT prophylaxis     Heparin  --------------------------------------------------------------------------------------    Admission Date  3/16/2023  3:55 PM  Chief Complaint SOB    Subjective  History of Present Illness  Seen at bedside in the ICU late this morning. Patient is resting comfortably in no acute distress. Down to just 2 L of oxygen. Pain is currently relatively well controlled. No family was present during my visit today, no new issues were identified, and case discussed with charge nurse.     Review of Systems - 12-point review of systems has been reviewed and is otherwise negative except as listed in the HPI    Objective  Physical Exam  Vitals: /78   Pulse 93   Temp 98.6 °F (37 °C) (Oral)   Resp 19   Ht 5' 4\" (1.626 m)   Wt 225 lb 5 oz (102.2 kg)   SpO2 96%   BMI 38.67 kg/m²   General: well-developed, well-nourished, no acute distress, cooperative  Skin: generally warm, dry, and intact, with normal color  HEENT: normocephalic, atraumatic, no gross abnormalities  Respiratory: clear to auscultation bilaterally without respiratory distress  Cardiovascular: regular rate and rhythm without murmur / rub / gallop  Abdominal: soft, nontender, nondistended, normoactive bowel sounds  Extremities: no obvious edema or
I had called per patient request Dr. Johann Taylor [phone] in [place] and gave him update about status of his patient. All questions were answered.     Tequila Johansen MD  3/17/2023  3:13 PM
Limited 2 d echo completed by [address]
[facility] Progress Note    --------------------------------------------------------------------------------------  Assessment / Plan  Bilateral submassive pulmonary emboli with acute hypoxia, right heart strain, and evidence of pulmonary infarction - Continues to improve, now on just 1 L nasal cannula. Per ICU note yesterday, to switch to Eliquis today. Does have tenderness palpation of the bilateral calves but ultrasound showed no clot. Due to unclear etiology of the PEs, lifelong anticoagulation has been recommended. Unfortunately, patient's hemoglobin has been dropping by approximately 2 g/day and, while this is probably dilutional, would consider holding off on transition to oral anticoagulation due to higher difficulty in its reversal compared to heparin. Hypercoagulable work-up is underway. Acute anemia, suspect dilutional - baseline hemoglobin per EMR review was about 13 g/dL. Here was 13.3-11.6-11.4. No hematuria. Has not had a bowel movement which is encouraging as GI bleed tends to induce bowel movements. No flank or back pain to suggest retroperitoneal hematoma and no obvious external wounds. Patient had been on IV fluids since admission, and currently feel this is more dilutional, though we will continue to monitor hemoglobin. Check LDH and haptoglobin to evaluate for evidence of hemolysis and would also check FOBT just to ensure no GI bleed. As above, would consider holding off on transition to oral anticoagulation for today. Hx HTN, HPL    Anticipated disposition  Home when ready  Anticipated needs on discharge None  Anticipated length of stay  ~24-48h  Code status    Full Code  DVT prophylaxis     Heparin  --------------------------------------------------------------------------------------    Admission Date  3/16/2023  3:55 PM  Chief Complaint SOB    Subjective  History of Present Illness  Patient was seen at bedside late this morning in her room on the 6 floor.
Patient admitted from ER 9 to 210, with the following belongings phone phone  medications shirt bra leggings sandals purse wallet, placed on monitor, patient oriented to room and unit visiting hours. Patient guide at bedside, reviewed patient rights and responsibilities. MRSA nasal swab obtained. Bed alarm on. Call light within reach.
Patient left unit at this time via wheelchair escorted by aide with all belongings and d/c instructions. No concerns/questions voiced at time of discharge.
Patient up ambulating in [place] independently with RN walking close by. Pulse Ox 96% on RA while ambulating. Patient did become short of breath after approximately 20 feet and had to sit down to rest before continuing. Pulse Ox remained between 96-97% for the entirety of the walk. Patient returned back to room and back to bed. Resting Pulse Ox 97%.
Patients discharge instructions reviewed, all concerns and questions answered to patients satisfaction.
This note in addition to the consult note submitted earlier on 3/17/23 containing updates from morning rounds:  Patient being treated for bilateral pulmonary emboli and is s/p 1/2 dose of TPA in ED last night. Patient resting comfortably in room on 2L NC saturating at 96%. Pressures stable, MAP upper 70s. Drips: heparin, LR 75 cc/hr  Access: Right peripheral IV, right femoral CVC  Consults: Cardiology  GI: PPI daily  Nutrition: regular adult diet  Breathing treatments Q4H while awake  Meds: Toprol 50 BID HELD for pressures  Labs: K 3.8 replaced 20 mEq, procal downtrending . 09 (0.11), CRP 10.8, bnp downtrending 4062 (6058), trop 42>43>38, INR 1.2, fibrinogen 506  Restarted home meloxicam and oxybutynin. Continue to monitor in ICU with possibility to transfer to floors.      Valentina De Santiago DO  Family Medicine Resident, PGY-1  Sindhu  3/17/2023 12:15 PM
Took report from ICU RN monet, pt on floor resting comfortably, mild anxiety per pt, RR down now, HR WDL.
accessory muscle use. Non-labored. Lungs clear with No crackles. No wheezing. No rhonchi. Diminished bilaterally. CV: Regular rate. Regular rhythm. No murmur . No edema. ABD: Non-tender. Non-distended. Soft, round . Normal bowel sounds. Skin: Warm and dry. M/S: No cyanosis. No joint deformity. No clubbing. Neuro: Awake. Follows commands. O x 3, ALEGRIA  Psych:  calm and interactive     I/O: No intake/output data recorded. No intake/output data recorded. Results:  CBC:   Recent Labs     03/17/23  0400 03/18/23  0246 03/18/23  1345 03/19/23  0342   WBC 9.2 6.5  --  7.4   HGB 11.6 9.4* 10.1* 10.0*   HCT 36.3 30.0*  --  31.8*   MCV 89.0 90.9  --  89.3    188  --  201     BMP:   Recent Labs     03/17/23  0400 03/18/23  0246 03/19/23  0342    138 138   K 3.8 4.0 3.9    105 104   CO2 22 23 26   PHOS 4.1 3.4 3.3   BUN 15 14 10   CREATININE 0.7 0.7 0.7     LFT:   Recent Labs     03/16/23  1615   ALKPHOS 142*   ALT 15   AST 16   PROT 7.5   BILITOT 0.5   LABALBU 4.0     PT/INR:   Recent Labs     03/17/23  0400   PROTIME 13.9*   INR 1.2     Procalcitonin:   Recent Labs     03/17/23  0000   PROCAL 0.09*     Cultures:  No results for input(s): CULTRESP in the last 72 hours. ABG:   No results for input(s): PH, PO2, PCO2, HCO3, BE, O2SAT in the last 72 hours. Films:  CTA PULMONARY W CONTRAST    Addendum Date: 3/16/2023    ADDENDUM: Critical results were called by Dr. Marsha Shea MD to Dr Jessi Cuevas on 3/16/2023 at 17:54. Result Date: 3/16/2023  EXAMINATION: CTA OF THE CHEST 3/16/2023 5:03 pm TECHNIQUE: CTA of the chest was performed after the administration of intravenous contrast.  Multiplanar reformatted images are provided for review. MIP images are provided for review. Automated exposure control, iterative reconstruction, and/or weight based adjustment of the mA/kV was utilized to reduce the radiation dose to as low as reasonably achievable. COMPARISON: None.  HISTORY: ORDERING SYSTEM
ipratropium-albuterol (DUONEB) nebulizer solution 1 ampule  1 ampule Inhalation Q4H WA Ysabel Harden MD   1 ampule at 03/17/23 1204    lactated ringers IV soln infusion   IntraVENous Continuous Rudy Crittenden, APRN - CNP 75 mL/hr at 03/17/23 1327 New Bag at 03/17/23 1327    pantoprazole (PROTONIX) tablet 40 mg  40 mg Oral QAM AC Rudy Jonna, APRN - CNP   40 mg at 03/17/23 0510    sodium chloride flush 0.9 % injection 5-40 mL  5-40 mL IntraVENous 2 times per day Rudy Jonna, APRN - CNP   10 mL at 03/17/23 0925    sodium chloride flush 0.9 % injection 5-40 mL  5-40 mL IntraVENous PRN Rudy Jonna, APRN - CNP        0.9 % sodium chloride infusion   IntraVENous PRN Rudy Jonna, APRN - CNP        dextrose 50 % IV solution  12.5 g IntraVENous Once PRN Rudy Jonna, APRN - CNP        acetaminophen (TYLENOL) tablet 650 mg  650 mg Oral Q6H PRN Rudy Jonna, APRN - CNP        [facility]ron [facility]) injection 4 mg  4 mg IntraVENous Q6H PRN Rudy Jonna, APRN - CNP            US DUP LOWER EXTREMITIES BILATERAL VENOUS   Final Result   No evidence of DVT in either lower extremity. CTA PULMONARY W CONTRAST   Final Result   Addendum (preliminary) 1 of 1   ADDENDUM:   Critical results were called by Dr. Lucero Isaac MD to Dr Ncia Lopes on   3/16/2023 at 17:54. Final   1. Moderate volume bilateral pulmonary emboli with right heart strain. Main   pulmonary artery is minimally dilated at 3.6 cm. Ground-glass opacity in the   right upper lobe may represent early pulmonary infarction. No pleural fluid. 2.  Dilatation of the ascending aorta measuring up to 4.3 cm in diameter.                 Physical Examination:  Vitals:   Vitals:    03/17/23 1100 03/17/23 1200 03/17/23 1204 03/17/23 1300   BP: 98/61 103/78  116/76   Pulse: (!) 105 98 93 97   Resp: 26 20 19 19   Temp:  97.5 °F (36.4 °C)     TempSrc:  Infrared     SpO2: 93% 92% 96% 93%   Weight:       Height:          General: No Acute
otherwise identified, no family was present at time of rounds, and case was discussed with nursing.     Review of Systems - 12-point review of systems has been reviewed and is otherwise negative except as listed in the HPI    Objective  Physical Exam  Vitals: BP (!) 166/109   Pulse 93   Temp 97.8 °F (36.6 °C) (Oral)   Resp 19   Ht 5' 4\" (1.626 m)   Wt 232 lb 9.4 oz (105.5 kg)   SpO2 94%   BMI 39.92 kg/m²   General: well-developed, well-nourished, no acute distress, cooperative  Skin: generally warm, dry, and intact, with normal color  HEENT: normocephalic, atraumatic, no gross abnormalities  Respiratory: clear to auscultation bilaterally without respiratory distress  Cardiovascular: regular rate and rhythm without murmur / rub / gallop  Abdominal: soft, nontender, nondistended, normoactive bowel sounds  Extremities: no obvious edema or deformity  Neurologic: awake, alert, no gross deficits  Psychiatric: normal affect, cooperative    Electronically signed by Rickie Toney DO on 3/19/2023 at 2:09 PM
reviewed and is otherwise negative except as listed in the HPI    Objective  Physical Exam  Vitals: BP (!) 153/104   Pulse 91   Temp 97.8 °F (36.6 °C) (Oral)   Resp 18   Ht 5' 4\" (1.626 m)   Wt 235 lb 3.7 oz (106.7 kg)   SpO2 93%   BMI 40.38 kg/m²   General: well-developed, well-nourished, no acute distress, cooperative  Skin: generally warm, dry, and intact, with normal color  HEENT: normocephalic, atraumatic, no gross abnormalities  Respiratory: clear to auscultation bilaterally without respiratory distress  Cardiovascular: regular rate and rhythm without murmur / rub / gallop  Abdominal: soft, nontender, nondistended, normoactive bowel sounds  Extremities: no obvious edema or deformity  Neurologic: awake, alert, no gross deficits  Psychiatric: normal affect, cooperative    Electronically signed by Goldie Kanner, DO on 3/20/2023 at 2:06 PM
HISTORY: ORDERING SYSTEM PROVIDED HISTORY: pe rule out TECHNOLOGIST PROVIDED HISTORY: Reason for exam:->pe rule out Decision Support Exception - unselect if not a suspected or confirmed emergency medical condition->Emergency Medical Condition (MA) FINDINGS: Pulmonary Arteries: Pulmonary artery is adequately enhanced for evaluation. There are moderate volume bilateral partially occlusive thrombi in the lobar and segmental pulmonary arteries bilaterally in upper and lower lobes. There is evidence of right heart strain. Main pulmonary artery measures 3.6 cm in diameter Mediastinum: Ascending aorta is dilated measuring up to 4.3 cm in diameter. No evidence of mediastinal adenopathy. Right heart is mildly dilated. Left heart appears normal. Lungs/pleura: Lungs demonstrate minimal dependent atelectasis. Slight ground-glass opacity in the upper lobe on the right anteriorly likely reflects early changes of pulmonary infarction. Upper Abdomen: Limited images of the upper abdomen are unremarkable. 1 cm area of high density in the posterior mid right kidney could reflect early contrast excretion or nephrolithiasis. Soft Tissues/Bones: No acute bone or soft tissue abnormality. 1.  Moderate volume bilateral pulmonary emboli with right heart strain. Main pulmonary artery is minimally dilated at 3.6 cm. Ground-glass opacity in the right upper lobe may represent early pulmonary infarction. No pleural fluid. 2.  Dilatation of the ascending aorta measuring up to 4.3 cm in diameter. US DUP LOWER EXTREMITIES BILATERAL VENOUS    Result Date: 3/16/2023  EXAMINATION: DUPLEX VENOUS ULTRASOUND OF THE BILATERAL LOWER EXTREMITIES3/16/2023 7:27 pm TECHNIQUE: Duplex ultrasound using B-mode/gray scaled imaging, Doppler spectral analysis and color flow Doppler was obtained of the deep venous structures of the lower bilateral extremities. COMPARISON: None.  HISTORY: ORDERING SYSTEM PROVIDED HISTORY: rule out dvt TECHNOLOGIST
addition the following applies:    Check: serial labs, Procalcitonin, CRP, ESR  Medication Alterations: 50 mg IV TPA, heparin drip  Procedures: N/A  Imaging: reviewed, echocardiogram, doppler US legs  New Consultations: N/A, future hematology/oncology consultation   VENT: N/A    Access: Peripheral, establish central line   Consults: N/A  Drips: Heparin  DVT Phylaxis: Heparin  GI Prophylaxis: N/A  Nutrition: PO  ABX: N/A    - once echocardiogram confirms right heart strain, initiate half-dose TPA IV  - heparin drip  - O2, IS  - supportive care to continue     Thank you for allowing me to participate in the care of this patient. Care reviewed with nursing staff, medical and surgical specialty care, primary care and the patient's family as available. Restraints are ordered when the patient can do harm to him/herself by pulling out devices. Critical Care Time: 35 minutes excluding procedures    Bouchra Purcell M.D.     Bouchra Purcell MD  3/16/2023  7:45 PM
chloride flush 0.9 % injection 5-40 mL  5-40 mL IntraVENous PRN Owen Gipson APRN - CNP        0.9 % sodium chloride infusion   IntraVENous PRN JORGE Lipscomb - ANGELINA        acetaminophen (TYLENOL) tablet 650 mg  650 mg Oral Q6H PRN Owen Gipson APRN - ANGELINA        ondansetron [facility]) injection 4 mg  4 mg IntraVENous Q6H PRN JORGE Lipscomb - ANGELINA          ASSESSMENT:  Pulmonary Embolism, Submassive, Right Heart Strain  CTA Chest 3/16/2023: Moderate volume bilateral pulmonary emboli with right heart strain. Main   pulmonary artery is minimally dilated at 3.6 cm. Ground-glass opacity in the   right upper lobe may represent early pulmonary infarction. No pleural fluid. Dilatation of the ascending aorta measuring up to 4.3 cm in diameter.    BNP 6058 3/16) to 4062 (3/17)  Troponin 42  Sedentary lifestyle as risk factor  Doppler US legs negative   Dilation of Ascending Aorta 4.3 cm in diameter   Respiratory Failure with Hypoxia   Oxygen demand from 10L O2 NC to 2L O2 NC  Lactic Acidosis - resolved 1.0 (3/17) from 3.2 (3/16)  Leukocytosis, Reactive  Lymphopenia   Anxiety/Depression   H/O Complex intra-articular fracture of the right distal radius 5/2021     - future hematology/oncology consultation  - O2, IS, wean oxygen as able  - intiiate Eliquis 10 mg PO BID in AM 3/19/2023 for 7 days then Eliquis 5 mg PO BID x 6 months time  - target lifelong anticoagulation due to clot burden  - echocardiogram Monday 3/20/2023 with anticipated D/C on Monday     Nikhil Christine MD  3/18/2023  5:35 PM

## 2023-03-23 ENCOUNTER — TELEPHONE (OUTPATIENT)
Dept: CARDIOLOGY CLINIC | Age: 58
End: 2023-03-23

## 2023-03-23 LAB
F5 P.R506Q BLD/T QL: NEGATIVE
SPECIMEN SOURCE: NORMAL

## 2023-03-23 NOTE — TELEPHONE ENCOUNTER
Pt was admitted 3/16-3/20/2023 [facility] for Pulmonary infarct. Please advise if pt needs HFU with cardio.

## 2023-03-27 ENCOUNTER — OFFICE VISIT (OUTPATIENT)
Dept: PRIMARY CARE CLINIC | Age: 58
End: 2023-03-27

## 2023-03-27 VITALS
DIASTOLIC BLOOD PRESSURE: 86 MMHG | HEART RATE: 78 BPM | OXYGEN SATURATION: 98 % | WEIGHT: 230 LBS | RESPIRATION RATE: 18 BRPM | BODY MASS INDEX: 39.27 KG/M2 | SYSTOLIC BLOOD PRESSURE: 138 MMHG | HEIGHT: 64 IN | TEMPERATURE: 97.6 F

## 2023-03-27 DIAGNOSIS — R10.13 EPIGASTRIC PAIN: Primary | ICD-10-CM

## 2023-03-27 DIAGNOSIS — Z09 HOSPITAL DISCHARGE FOLLOW-UP: ICD-10-CM

## 2023-03-27 DIAGNOSIS — G47.33 OSA (OBSTRUCTIVE SLEEP APNEA): ICD-10-CM

## 2023-03-27 DIAGNOSIS — I26.99 OTHER ACUTE PULMONARY EMBOLISM, UNSPECIFIED WHETHER ACUTE COR PULMONALE PRESENT (HCC): ICD-10-CM

## 2023-03-27 DIAGNOSIS — R06.02 SOB (SHORTNESS OF BREATH): ICD-10-CM

## 2023-03-27 RX ORDER — PANTOPRAZOLE SODIUM 40 MG/1
40 TABLET, DELAYED RELEASE ORAL
Qty: 30 TABLET | Refills: 5 | Status: SHIPPED | OUTPATIENT
Start: 2023-03-27

## 2023-03-27 SDOH — ECONOMIC STABILITY: FOOD INSECURITY: WITHIN THE PAST 12 MONTHS, YOU WORRIED THAT YOUR FOOD WOULD RUN OUT BEFORE YOU GOT MONEY TO BUY MORE.: NEVER TRUE

## 2023-03-27 SDOH — ECONOMIC STABILITY: INCOME INSECURITY: HOW HARD IS IT FOR YOU TO PAY FOR THE VERY BASICS LIKE FOOD, HOUSING, MEDICAL CARE, AND HEATING?: NOT HARD AT ALL

## 2023-03-27 SDOH — ECONOMIC STABILITY: HOUSING INSECURITY
IN THE LAST 12 MONTHS, WAS THERE A TIME WHEN YOU DID NOT HAVE A STEADY PLACE TO SLEEP OR SLEPT IN A SHELTER (INCLUDING NOW)?: NO

## 2023-03-27 SDOH — ECONOMIC STABILITY: FOOD INSECURITY: WITHIN THE PAST 12 MONTHS, THE FOOD YOU BOUGHT JUST DIDN'T LAST AND YOU DIDN'T HAVE MONEY TO GET MORE.: NEVER TRUE

## 2023-03-27 ASSESSMENT — PATIENT HEALTH QUESTIONNAIRE - PHQ9
10. IF YOU CHECKED OFF ANY PROBLEMS, HOW DIFFICULT HAVE THESE PROBLEMS MADE IT FOR YOU TO DO YOUR WORK, TAKE CARE OF THINGS AT HOME, OR GET ALONG WITH OTHER PEOPLE: 0
SUM OF ALL RESPONSES TO PHQ9 QUESTIONS 1 & 2: 0
SUM OF ALL RESPONSES TO PHQ QUESTIONS 1-9: 0
4. FEELING TIRED OR HAVING LITTLE ENERGY: 0
SUM OF ALL RESPONSES TO PHQ QUESTIONS 1-9: 0
5. POOR APPETITE OR OVEREATING: 0
SUM OF ALL RESPONSES TO PHQ QUESTIONS 1-9: 0
3. TROUBLE FALLING OR STAYING ASLEEP: 0
8. MOVING OR SPEAKING SO SLOWLY THAT OTHER PEOPLE COULD HAVE NOTICED. OR THE OPPOSITE, BEING SO FIGETY OR RESTLESS THAT YOU HAVE BEEN MOVING AROUND A LOT MORE THAN USUAL: 0
1. LITTLE INTEREST OR PLEASURE IN DOING THINGS: 0
9. THOUGHTS THAT YOU WOULD BE BETTER OFF DEAD, OR OF HURTING YOURSELF: 0
6. FEELING BAD ABOUT YOURSELF - OR THAT YOU ARE A FAILURE OR HAVE LET YOURSELF OR YOUR FAMILY DOWN: 0
SUM OF ALL RESPONSES TO PHQ QUESTIONS 1-9: 0
7. TROUBLE CONCENTRATING ON THINGS, SUCH AS READING THE NEWSPAPER OR WATCHING TELEVISION: 0
2. FEELING DOWN, DEPRESSED OR HOPELESS: 0

## 2023-03-27 ASSESSMENT — ENCOUNTER SYMPTOMS
WHEEZING: 0
VOMITING: 0
SINUS PRESSURE: 0
NAUSEA: 0
ABDOMINAL PAIN: 1
COLOR CHANGE: 0
BACK PAIN: 0
SHORTNESS OF BREATH: 1
SORE THROAT: 0
DIARRHEA: 0
COUGH: 0
CHEST TIGHTNESS: 0
APNEA: 0
BLOOD IN STOOL: 0
FACIAL SWELLING: 0
ALLERGIC/IMMUNOLOGIC NEGATIVE: 1
PHOTOPHOBIA: 0

## 2023-03-27 NOTE — PROGRESS NOTES
in the epigastric area. There is no guarding or rebound. Musculoskeletal:         General: Normal range of motion. Cervical back: Normal range of motion and neck supple. Lymphadenopathy:      Cervical: No cervical adenopathy. Skin:     General: Skin is warm and dry. Findings: No erythema or rash. Neurological:      Mental Status: She is alert and oriented to person, place, and time. Cranial Nerves: No cranial nerve deficit. Motor: No abnormal muscle tone. Coordination: Coordination normal.      Deep Tendon Reflexes: Reflexes are normal and symmetric. Psychiatric:         Behavior: Behavior normal.         Judgment: Judgment normal.       An electronic signature was used to authenticate this note.   --Orpa Shruthi, DO

## 2023-03-28 DIAGNOSIS — N39.0 URINARY TRACT INFECTION WITHOUT HEMATURIA, SITE UNSPECIFIED: ICD-10-CM

## 2023-03-29 RX ORDER — CHOLECALCIFEROL (VITAMIN D3) 1250 MCG
CAPSULE ORAL
Qty: 12 CAPSULE | Refills: 1 | Status: SHIPPED | OUTPATIENT
Start: 2023-03-29

## 2023-03-29 RX ORDER — HYDROXYZINE PAMOATE 25 MG/1
25 CAPSULE ORAL 3 TIMES DAILY PRN
Qty: 42 CAPSULE | Refills: 0 | Status: SHIPPED | OUTPATIENT
Start: 2023-03-29 | End: 2023-04-12

## 2023-03-29 RX ORDER — LEVOFLOXACIN 500 MG/1
TABLET, FILM COATED ORAL
Qty: 10 TABLET | Refills: 0 | OUTPATIENT
Start: 2023-03-29

## 2023-03-31 ENCOUNTER — OFFICE VISIT (OUTPATIENT)
Dept: FAMILY MEDICINE CLINIC | Age: 58
End: 2023-03-31
Payer: COMMERCIAL

## 2023-03-31 VITALS
BODY MASS INDEX: 39.27 KG/M2 | OXYGEN SATURATION: 96 % | DIASTOLIC BLOOD PRESSURE: 88 MMHG | HEART RATE: 73 BPM | HEIGHT: 64 IN | RESPIRATION RATE: 18 BRPM | SYSTOLIC BLOOD PRESSURE: 128 MMHG | TEMPERATURE: 98.2 F | WEIGHT: 230 LBS

## 2023-03-31 DIAGNOSIS — L03.116 CELLULITIS OF LEFT LOWER EXTREMITY: Primary | ICD-10-CM

## 2023-03-31 PROCEDURE — G8417 CALC BMI ABV UP PARAM F/U: HCPCS | Performed by: FAMILY MEDICINE

## 2023-03-31 PROCEDURE — 3079F DIAST BP 80-89 MM HG: CPT | Performed by: FAMILY MEDICINE

## 2023-03-31 PROCEDURE — 1111F DSCHRG MED/CURRENT MED MERGE: CPT | Performed by: FAMILY MEDICINE

## 2023-03-31 PROCEDURE — 3074F SYST BP LT 130 MM HG: CPT | Performed by: FAMILY MEDICINE

## 2023-03-31 PROCEDURE — 99214 OFFICE O/P EST MOD 30 MIN: CPT | Performed by: FAMILY MEDICINE

## 2023-03-31 PROCEDURE — 3017F COLORECTAL CA SCREEN DOC REV: CPT | Performed by: FAMILY MEDICINE

## 2023-03-31 PROCEDURE — G8427 DOCREV CUR MEDS BY ELIG CLIN: HCPCS | Performed by: FAMILY MEDICINE

## 2023-03-31 PROCEDURE — 1036F TOBACCO NON-USER: CPT | Performed by: FAMILY MEDICINE

## 2023-03-31 PROCEDURE — G8484 FLU IMMUNIZE NO ADMIN: HCPCS | Performed by: FAMILY MEDICINE

## 2023-03-31 RX ORDER — DOXYCYCLINE HYCLATE 100 MG
100 TABLET ORAL 2 TIMES DAILY
Qty: 20 TABLET | Refills: 0 | Status: SHIPPED | OUTPATIENT
Start: 2023-03-31 | End: 2023-04-10

## 2023-03-31 ASSESSMENT — ENCOUNTER SYMPTOMS
WHEEZING: 0
SORE THROAT: 0
SHORTNESS OF BREATH: 0
SINUS PRESSURE: 0
NAUSEA: 0
FACIAL SWELLING: 0
BACK PAIN: 0
APNEA: 0
ABDOMINAL PAIN: 0
BLOOD IN STOOL: 0
PHOTOPHOBIA: 0
DIARRHEA: 0
COLOR CHANGE: 0
ALLERGIC/IMMUNOLOGIC NEGATIVE: 1
CHEST TIGHTNESS: 0
VOMITING: 0
COUGH: 0

## 2023-03-31 NOTE — PROGRESS NOTES
Chief Complaint:   Chief Complaint   Patient presents with    Leg Problem     Left leg- warm to touch     Discuss Medications       Skin Problem  This is a new problem. The current episode started in the past 7 days. The affected locations include the left lower leg. The rash is characterized by redness. She was exposed to nothing. Pertinent negatives include no congestion, cough, diarrhea, shortness of breath, sore throat or vomiting. Patient Active Problem List   Diagnosis    Palpitations    Atypical chest pain    Atrial septal aneurysm    Type 2 diabetes mellitus without complication, without long-term current use of insulin (HCC)    Hyperlipidemia    Essential hypertension    Pulmonary embolism, bilateral (HCC)    Acute respiratory failure with hypoxia (HCC)    Acute electrocardiogram changes    Pulmonary infarct (HCC)    Lactic acidosis    Cor pulmonale, acute (HCC)    Pulmonary embolus (HCC)    Right heart enlargement    S/P admn tPA in diff fac w/n last 24 hr bef adm to crnt fac    Anemia    Asymptomatic bacteriuria    Primary hypertension       Past Medical History:   Diagnosis Date    Atrial septal aneurysm     Gall bladder disease     Herniated disc     Hyperlipidemia     Hypertension     Infectious lymphangitis of deep lymphatics     Kidney stone        Past Surgical History:   Procedure Laterality Date     SECTION      WISDOM TOOTH EXTRACTION         Current Outpatient Medications   Medication Sig Dispense Refill    doxycycline hyclate (VIBRA-TABS) 100 MG tablet Take 1 tablet by mouth 2 times daily for 10 days 20 tablet 0    hydrOXYzine pamoate (VISTARIL) 25 MG capsule TAKE 1 CAPSULE BY MOUTH 3 TIMES DAILY AS NEEDED FOR ITCHING.  42 capsule 0    Cholecalciferol (VITAMIN D3) 1.25 MG (94617 UT) CAPS TAKE 1 CAPSULE BY MOUTH ONE TIME PER WEEK 12 capsule 1    apixaban (ELIQUIS) 5 MG TABS tablet Take 1 tablet by mouth 2 times daily 60 tablet 5    pantoprazole (PROTONIX) 40 MG tablet Take 1 tablet by

## 2023-04-19 RX ORDER — PANTOPRAZOLE SODIUM 40 MG/1
TABLET, DELAYED RELEASE ORAL
Qty: 30 TABLET | Refills: 5 | Status: SHIPPED | OUTPATIENT
Start: 2023-04-19

## 2023-04-24 ENCOUNTER — OFFICE VISIT (OUTPATIENT)
Dept: FAMILY MEDICINE CLINIC | Age: 58
End: 2023-04-24
Payer: COMMERCIAL

## 2023-04-24 ENCOUNTER — HOSPITAL ENCOUNTER (EMERGENCY)
Age: 58
Discharge: HOME | End: 2023-04-24
Attending: EMERGENCY MEDICINE
Payer: COMMERCIAL

## 2023-04-24 ENCOUNTER — APPOINTMENT (OUTPATIENT)
Dept: GENERAL RADIOLOGY | Age: 58
End: 2023-04-24
Payer: COMMERCIAL

## 2023-04-24 VITALS
TEMPERATURE: 97.5 F | OXYGEN SATURATION: 99 % | RESPIRATION RATE: 13 BRPM | BODY MASS INDEX: 39.27 KG/M2 | DIASTOLIC BLOOD PRESSURE: 90 MMHG | HEART RATE: 52 BPM | SYSTOLIC BLOOD PRESSURE: 137 MMHG | HEIGHT: 64 IN | WEIGHT: 230 LBS

## 2023-04-24 VITALS
HEART RATE: 85 BPM | RESPIRATION RATE: 18 BRPM | HEIGHT: 64 IN | OXYGEN SATURATION: 96 % | WEIGHT: 230 LBS | TEMPERATURE: 97.8 F | SYSTOLIC BLOOD PRESSURE: 126 MMHG | DIASTOLIC BLOOD PRESSURE: 84 MMHG | BODY MASS INDEX: 39.27 KG/M2

## 2023-04-24 DIAGNOSIS — J30.2 SEASONAL ALLERGIC RHINITIS, UNSPECIFIED TRIGGER: ICD-10-CM

## 2023-04-24 DIAGNOSIS — R07.9 CHEST PAIN, UNSPECIFIED TYPE: Primary | ICD-10-CM

## 2023-04-24 DIAGNOSIS — R30.0 BURNING WITH URINATION: ICD-10-CM

## 2023-04-24 DIAGNOSIS — R30.0 BURNING WITH URINATION: Primary | ICD-10-CM

## 2023-04-24 DIAGNOSIS — N39.0 URINARY TRACT INFECTION WITHOUT HEMATURIA, SITE UNSPECIFIED: ICD-10-CM

## 2023-04-24 LAB
ALBUMIN SERPL-MCNC: 4.3 G/DL (ref 3.5–5.2)
ALP SERPL-CCNC: 113 U/L (ref 35–104)
ALT SERPL-CCNC: 26 U/L (ref 0–32)
ANION GAP SERPL CALCULATED.3IONS-SCNC: 11 MMOL/L (ref 7–16)
AST SERPL-CCNC: 19 U/L (ref 0–31)
BASOPHILS # BLD: 0.04 E9/L (ref 0–0.2)
BASOPHILS NFR BLD: 0.4 % (ref 0–2)
BILIRUB SERPL-MCNC: 0.3 MG/DL (ref 0–1.2)
BILIRUBIN, POC: NORMAL
BLOOD URINE, POC: NORMAL
BNP BLD-MCNC: 11 PG/ML (ref 0–125)
BUN SERPL-MCNC: 17 MG/DL (ref 6–20)
CALCIUM SERPL-MCNC: 10 MG/DL (ref 8.6–10.2)
CHLORIDE SERPL-SCNC: 102 MMOL/L (ref 98–107)
CLARITY, POC: CLEAR
CO2 SERPL-SCNC: 27 MMOL/L (ref 22–29)
COLOR, POC: YELLOW
CREAT SERPL-MCNC: 0.7 MG/DL (ref 0.5–1)
EKG ATRIAL RATE: 67 BPM
EKG P AXIS: 43 DEGREES
EKG P-R INTERVAL: 178 MS
EKG Q-T INTERVAL: 388 MS
EKG QRS DURATION: 78 MS
EKG QTC CALCULATION (BAZETT): 409 MS
EKG R AXIS: 62 DEGREES
EKG T AXIS: 62 DEGREES
EKG VENTRICULAR RATE: 67 BPM
EOSINOPHIL # BLD: 0.04 E9/L (ref 0.05–0.5)
EOSINOPHIL NFR BLD: 0.4 % (ref 0–6)
ERYTHROCYTE [DISTWIDTH] IN BLOOD BY AUTOMATED COUNT: 15.1 FL (ref 11.5–15)
GLUCOSE SERPL-MCNC: 136 MG/DL (ref 74–99)
GLUCOSE URINE, POC: NORMAL
HCT VFR BLD AUTO: 41.8 % (ref 34–48)
HGB BLD-MCNC: 13 G/DL (ref 11.5–15.5)
IMM GRANULOCYTES # BLD: 0.03 E9/L
IMM GRANULOCYTES NFR BLD: 0.3 % (ref 0–5)
KETONES, POC: NORMAL
LEUKOCYTE EST, POC: NORMAL
LYMPHOCYTES # BLD: 1.6 E9/L (ref 1.5–4)
LYMPHOCYTES NFR BLD: 16.7 % (ref 20–42)
MCH RBC QN AUTO: 28.1 PG (ref 26–35)
MCHC RBC AUTO-ENTMCNC: 31.1 % (ref 32–34.5)
MCV RBC AUTO: 90.5 FL (ref 80–99.9)
MONOCYTES # BLD: 0.48 E9/L (ref 0.1–0.95)
MONOCYTES NFR BLD: 5 % (ref 2–12)
NEUTROPHILS # BLD: 7.38 E9/L (ref 1.8–7.3)
NEUTS SEG NFR BLD: 77.2 % (ref 43–80)
NITRITE, POC: NORMAL
PH, POC: 6
PLATELET # BLD AUTO: 396 E9/L (ref 130–450)
PMV BLD AUTO: 9.7 FL (ref 7–12)
POTASSIUM SERPL-SCNC: 3.8 MMOL/L (ref 3.5–5)
PROT SERPL-MCNC: 7.5 G/DL (ref 6.4–8.3)
PROTEIN, POC: 30
RBC # BLD AUTO: 4.62 E12/L (ref 3.5–5.5)
SARS-COV-2 RDRP RESP QL NAA+PROBE: NOT DETECTED
SODIUM SERPL-SCNC: 140 MMOL/L (ref 132–146)
SPECIFIC GRAVITY, POC: >=1.03
TROPONIN, HIGH SENSITIVITY: 11 NG/L (ref 0–9)
TROPONIN, HIGH SENSITIVITY: 7 NG/L (ref 0–9)
UROBILINOGEN, POC: 0.2
WBC # BLD: 9.6 E9/L (ref 4.5–11.5)

## 2023-04-24 PROCEDURE — 99214 OFFICE O/P EST MOD 30 MIN: CPT | Performed by: FAMILY MEDICINE

## 2023-04-24 PROCEDURE — 84484 ASSAY OF TROPONIN QUANT: CPT

## 2023-04-24 PROCEDURE — 81002 URINALYSIS NONAUTO W/O SCOPE: CPT | Performed by: FAMILY MEDICINE

## 2023-04-24 PROCEDURE — G8427 DOCREV CUR MEDS BY ELIG CLIN: HCPCS | Performed by: FAMILY MEDICINE

## 2023-04-24 PROCEDURE — 80053 COMPREHEN METABOLIC PANEL: CPT

## 2023-04-24 PROCEDURE — 1036F TOBACCO NON-USER: CPT | Performed by: FAMILY MEDICINE

## 2023-04-24 PROCEDURE — 85025 COMPLETE CBC W/AUTO DIFF WBC: CPT

## 2023-04-24 PROCEDURE — 83880 ASSAY OF NATRIURETIC PEPTIDE: CPT

## 2023-04-24 PROCEDURE — 3079F DIAST BP 80-89 MM HG: CPT | Performed by: FAMILY MEDICINE

## 2023-04-24 PROCEDURE — 93005 ELECTROCARDIOGRAM TRACING: CPT | Performed by: STUDENT IN AN ORGANIZED HEALTH CARE EDUCATION/TRAINING PROGRAM

## 2023-04-24 PROCEDURE — 93010 ELECTROCARDIOGRAM REPORT: CPT | Performed by: INTERNAL MEDICINE

## 2023-04-24 PROCEDURE — 3017F COLORECTAL CA SCREEN DOC REV: CPT | Performed by: FAMILY MEDICINE

## 2023-04-24 PROCEDURE — 3074F SYST BP LT 130 MM HG: CPT | Performed by: FAMILY MEDICINE

## 2023-04-24 PROCEDURE — 71045 X-RAY EXAM CHEST 1 VIEW: CPT

## 2023-04-24 PROCEDURE — G8417 CALC BMI ABV UP PARAM F/U: HCPCS | Performed by: FAMILY MEDICINE

## 2023-04-24 PROCEDURE — 87635 SARS-COV-2 COVID-19 AMP PRB: CPT

## 2023-04-24 PROCEDURE — 99285 EMERGENCY DEPT VISIT HI MDM: CPT

## 2023-04-24 RX ORDER — CEFDINIR 300 MG/1
300 CAPSULE ORAL 2 TIMES DAILY
Qty: 20 CAPSULE | Refills: 0 | Status: SHIPPED | OUTPATIENT
Start: 2023-04-24 | End: 2023-05-04

## 2023-04-24 RX ORDER — FLUTICASONE PROPIONATE 50 MCG
1 SPRAY, SUSPENSION (ML) NASAL DAILY
Qty: 16 G | Refills: 2 | Status: SHIPPED | OUTPATIENT
Start: 2023-04-24

## 2023-04-24 ASSESSMENT — ENCOUNTER SYMPTOMS
EYES NEGATIVE: 1
RESPIRATORY NEGATIVE: 1
SINUS COMPLAINT: 1
FACIAL SWELLING: 0
SORE THROAT: 1
TROUBLE SWALLOWING: 0
RHINORRHEA: 1

## 2023-04-24 ASSESSMENT — LIFESTYLE VARIABLES
HOW OFTEN DO YOU HAVE A DRINK CONTAINING ALCOHOL: MONTHLY OR LESS
HOW MANY STANDARD DRINKS CONTAINING ALCOHOL DO YOU HAVE ON A TYPICAL DAY: 1 OR 2

## 2023-04-24 ASSESSMENT — PAIN SCALES - GENERAL: PAINLEVEL_OUTOF10: 7

## 2023-04-24 NOTE — PROGRESS NOTES
tablet by mouth 2 times daily 60 tablet 5    apixaban starter pack (ELIQUIS DVT/PE STARTER PACK) 5 MG TBPK tablet Take 1 tablet by mouth See Admin Instructions 74 tablet 0    oxybutynin (DITROPAN) 5 MG tablet Take 5 mg by mouth 2 times daily      furosemide (LASIX) 20 MG tablet TAKE 1 TABLET BY MOUTH EVERY DAY 90 tablet 0    atorvastatin (LIPITOR) 40 MG tablet TAKE 1 TABLET BY MOUTH EVERY DAY (Patient taking differently: as needed TAKE 1 TABLET BY MOUTH EVERY DAY) 90 tablet 3    metoprolol tartrate (LOPRESSOR) 50 MG tablet TAKE 1 TABLET BY MOUTH TWICE A  tablet 3    DULoxetine (CYMBALTA) 60 MG extended release capsule TAKE 1 CAPSULE BY MOUTH EVERY DAY 90 capsule 3    loratadine (CLARITIN) 10 MG tablet TAKE 1 TABLET BY MOUTH EVERY DAY (Patient taking differently: as needed) 30 tablet 0    tiotropium (SPIRIVA RESPIMAT) 1.25 MCG/ACT AERS inhaler Inhale 2 puffs into the lungs daily 1 each 0    albuterol-ipratropium (COMBIVENT RESPIMAT)  MCG/ACT AERS inhaler Inhale 1 puff into the lungs every 6 hours 1 each 1    ARIPiprazole (ABILIFY) 5 MG tablet Take 1 tablet by mouth daily (Patient taking differently: Take 5 mg by mouth as needed) 90 tablet 3    meclizine (ANTIVERT) 25 MG tablet TAKE 1 TABLET BY MOUTH EVERY 8 HOURS AS NEEDED      diclofenac sodium (VOLTAREN) 1 % GEL APPLY TO AFFECTED AREA THREE TIMES PER DAY       No current facility-administered medications for this visit. Allergies   Allergen Reactions    Quinolones Other (See Comments)     This appears to be a new Dilatation of the ascending aorta measuring up to 4.3 cm  - will defer further floroquinonlones? ??  Previous CTA in 2019-- Aorta: Normal. No aortic aneurysm. No aortic dissection.         Social History     Socioeconomic History    Marital status: Legally      Spouse name: None    Number of children: None    Years of education: None    Highest education level: None   Tobacco Use    Smoking status: Never    Smokeless tobacco:

## 2023-04-24 NOTE — ED NOTES
Discharge instructions given. Patient verbalizes understanding. No other noted or stated problems at this time. Patient will follow up with primary care.       Iona Alvarez RN  04/24/23 2503

## 2023-04-24 NOTE — DISCHARGE INSTRUCTIONS
Use Tylenol or ibuprofen as needed for pain  Follow-up with primary care doctor  If you notice any new worrisome symptoms please return to the emergency department for evaluation

## 2023-04-25 NOTE — ED PROVIDER NOTES
found.    PROCEDURES   Unless otherwise noted below, none     CRITICAL CARE TIME (.cct)   Per attending attestation    PAST MEDICAL HISTORY/Chronic Conditions Affecting Care    has a past medical history of Atrial septal aneurysm, Gall bladder disease, Herniated disc, Hyperlipidemia, Hypertension, Infectious lymphangitis of deep lymphatics, and Kidney stone. EMERGENCY DEPARTMENT COURSE    Vitals:    Vitals:    04/24/23 1128 04/24/23 1306 04/24/23 1518   BP: (!) 130/102 122/77 (!) 137/90   Pulse: 78 58 52   Resp: 14 12 13   Temp: 97.5 °F (36.4 °C)     TempSrc: Oral     SpO2: 100% 100% 99%   Weight: 230 lb (104.3 kg)     Height: 5' 4\" (1.626 m)         Patient was given the following medications:  Medications - No data to display      Is this patient to be included in the SEP-1 core measure due to severe sepsis or septic shock? No Exclusion criteria - the patient is NOT to be included for SEP-1 Core Measure due to:  Infection is not suspected        Medical Decision Making/Differential Diagnosis:    CC/HPI Summary, Social Determinants of health, Records Reviewed, DDx, testing done/not done, ED Course, Reassessment, disposition considerations/shared decision making with patient, consults, disposition:      ED Course as of 04/25/23 1530   Mon Apr 24, 2023   1520 XR CHEST PORTABLE [JN]      ED Course User Index  [JN] Darren Reardon DO        Chronic Conditions:   Past Medical History:   Diagnosis Date    Atrial septal aneurysm     Gall bladder disease     Herniated disc     Hyperlipidemia     Hypertension     Infectious lymphangitis of deep lymphatics     Kidney stone        CONSULTS: (Who and What was discussed)  None    Discussion with Other Profesionals : None    Social Determinants : None    Records Reviewed : Inpatient Notes recent inpatient admission concerning pulmonary infarct    CC/HPI Summary, DDx, ED Course, and Reassessment:   Patient is a 59-year-old female past medical history of hyperlipidemia as well as

## 2023-04-26 LAB — BACTERIA UR CULT: NORMAL

## 2023-05-09 ENCOUNTER — OFFICE VISIT (OUTPATIENT)
Dept: CARDIOLOGY CLINIC | Age: 58
End: 2023-05-09
Payer: COMMERCIAL

## 2023-05-09 VITALS
OXYGEN SATURATION: 96 % | WEIGHT: 233.1 LBS | DIASTOLIC BLOOD PRESSURE: 98 MMHG | BODY MASS INDEX: 39.79 KG/M2 | RESPIRATION RATE: 18 BRPM | SYSTOLIC BLOOD PRESSURE: 138 MMHG | HEIGHT: 64 IN | HEART RATE: 88 BPM

## 2023-05-09 DIAGNOSIS — I10 PRIMARY HYPERTENSION: Primary | ICD-10-CM

## 2023-05-09 PROCEDURE — 99214 OFFICE O/P EST MOD 30 MIN: CPT | Performed by: INTERNAL MEDICINE

## 2023-05-09 PROCEDURE — 93000 ELECTROCARDIOGRAM COMPLETE: CPT | Performed by: INTERNAL MEDICINE

## 2023-05-09 PROCEDURE — G8417 CALC BMI ABV UP PARAM F/U: HCPCS | Performed by: INTERNAL MEDICINE

## 2023-05-09 PROCEDURE — 3080F DIAST BP >= 90 MM HG: CPT | Performed by: INTERNAL MEDICINE

## 2023-05-09 PROCEDURE — 3075F SYST BP GE 130 - 139MM HG: CPT | Performed by: INTERNAL MEDICINE

## 2023-05-09 PROCEDURE — 3017F COLORECTAL CA SCREEN DOC REV: CPT | Performed by: INTERNAL MEDICINE

## 2023-05-09 PROCEDURE — 1036F TOBACCO NON-USER: CPT | Performed by: INTERNAL MEDICINE

## 2023-05-09 PROCEDURE — G8427 DOCREV CUR MEDS BY ELIG CLIN: HCPCS | Performed by: INTERNAL MEDICINE

## 2023-05-09 NOTE — PATIENT INSTRUCTIONS
Will reschedule for a coronary CTA to rule out CAD due to exertional dyspnea, she is unable to exercise  Echo results were reviewed and discussed with the patient. It showed normal LV systolic diastolic function. Mildly dilated RV size with normal function. Advised to practice intermittent fasting for weight loss management. Advised to get a sleep study to rule out sleep apnea  Daily swimming to lose weight   Continue current mediations   Follow up with me 3 months, will do another echo to make sure she is not developing pulmonary hypertension.

## 2023-05-09 NOTE — PROGRESS NOTES
weight   Continue current mediations   Follow up with me 3 months, will do another echo to make sure she is not developing pulmonary hypertension. The patient's current medication list, allergies, problem list and results of all previously ordered testing were reviewed at today's visit.   Fritz Colin MD  [facility]) Cardiology

## 2023-05-10 DIAGNOSIS — R30.0 BURNING WITH URINATION: ICD-10-CM

## 2023-05-10 DIAGNOSIS — L03.116 CELLULITIS OF LEFT LOWER EXTREMITY: ICD-10-CM

## 2023-05-10 DIAGNOSIS — N39.0 URINARY TRACT INFECTION WITHOUT HEMATURIA, SITE UNSPECIFIED: ICD-10-CM

## 2023-05-10 RX ORDER — DOXYCYCLINE HYCLATE 100 MG
TABLET ORAL
Qty: 20 TABLET | Refills: 0 | Status: SHIPPED | OUTPATIENT
Start: 2023-05-10

## 2023-05-10 RX ORDER — HYDROXYZINE PAMOATE 25 MG/1
25 CAPSULE ORAL 3 TIMES DAILY PRN
Qty: 42 CAPSULE | Refills: 0 | Status: SHIPPED | OUTPATIENT
Start: 2023-05-10 | End: 2023-05-24

## 2023-05-10 RX ORDER — CEFDINIR 300 MG/1
CAPSULE ORAL
Qty: 20 CAPSULE | Refills: 0 | Status: SHIPPED | OUTPATIENT
Start: 2023-05-10

## 2023-05-12 ENCOUNTER — TELEPHONE (OUTPATIENT)
Dept: CARDIOLOGY CLINIC | Age: 58
End: 2023-05-12

## 2023-05-12 DIAGNOSIS — R06.02 SOB (SHORTNESS OF BREATH): Primary | ICD-10-CM

## 2023-05-12 DIAGNOSIS — Z01.818 PREOPERATIVE TESTING: ICD-10-CM

## 2023-05-22 NOTE — TELEPHONE ENCOUNTER
Auth pending through [facility]    Electronically signed by Juan J Parker on 5/22/2023 at 10:50 AM

## 2023-06-14 DIAGNOSIS — Z01.818 PREOPERATIVE TESTING: ICD-10-CM

## 2023-06-14 DIAGNOSIS — R06.02 SOB (SHORTNESS OF BREATH): ICD-10-CM

## 2023-06-14 LAB
ANION GAP SERPL CALCULATED.3IONS-SCNC: 13 MMOL/L (ref 7–16)
BUN SERPL-MCNC: 9 MG/DL (ref 6–20)
CALCIUM SERPL-MCNC: 10.1 MG/DL (ref 8.6–10.2)
CHLORIDE SERPL-SCNC: 106 MMOL/L (ref 98–107)
CO2 SERPL-SCNC: 25 MMOL/L (ref 22–29)
CREAT SERPL-MCNC: 0.7 MG/DL (ref 0.5–1)
GLUCOSE SERPL-MCNC: 104 MG/DL (ref 74–99)
POTASSIUM SERPL-SCNC: 4.1 MMOL/L (ref 3.5–5)
SODIUM SERPL-SCNC: 144 MMOL/L (ref 132–146)

## 2023-06-22 ENCOUNTER — HOSPITAL ENCOUNTER (OUTPATIENT)
Dept: CT IMAGING | Age: 58
Discharge: HOME | End: 2023-06-24
Payer: COMMERCIAL

## 2023-06-22 VITALS
OXYGEN SATURATION: 95 % | HEART RATE: 51 BPM | HEIGHT: 64 IN | RESPIRATION RATE: 20 BRPM | DIASTOLIC BLOOD PRESSURE: 66 MMHG | SYSTOLIC BLOOD PRESSURE: 121 MMHG | WEIGHT: 230 LBS | BODY MASS INDEX: 39.27 KG/M2

## 2023-06-22 DIAGNOSIS — R06.02 SHORTNESS OF BREATH: ICD-10-CM

## 2023-06-22 PROCEDURE — 36415 COLL VENOUS BLD VENIPUNCTURE: CPT

## 2023-06-22 PROCEDURE — 7100000011 HC PHASE II RECOVERY - ADDTL 15 MIN

## 2023-06-22 PROCEDURE — 6370000000 HC RX 637 (ALT 250 FOR IP): Performed by: INTERNAL MEDICINE

## 2023-06-22 PROCEDURE — 2580000003 HC RX 258: Performed by: INTERNAL MEDICINE

## 2023-06-22 PROCEDURE — 75574 CT ANGIO HRT W/3D IMAGE: CPT | Performed by: INTERNAL MEDICINE

## 2023-06-22 PROCEDURE — 6360000004 HC RX CONTRAST MEDICATION: Performed by: RADIOLOGY

## 2023-06-22 PROCEDURE — 75574 CT ANGIO HRT W/3D IMAGE: CPT

## 2023-06-22 PROCEDURE — 7100000010 HC PHASE II RECOVERY - FIRST 15 MIN

## 2023-06-22 RX ORDER — 0.9 % SODIUM CHLORIDE 0.9 %
1000 INTRAVENOUS SOLUTION INTRAVENOUS ONCE
Status: COMPLETED | OUTPATIENT
Start: 2023-06-22 | End: 2023-06-22

## 2023-06-22 RX ORDER — METOPROLOL TARTRATE 5 MG/5ML
5 INJECTION INTRAVENOUS EVERY 5 MIN PRN
Status: DISCONTINUED | OUTPATIENT
Start: 2023-06-22 | End: 2023-06-25 | Stop reason: HOSPADM

## 2023-06-22 RX ORDER — NITROGLYCERIN 0.4 MG/1
0.8 TABLET SUBLINGUAL ONCE
Status: COMPLETED | OUTPATIENT
Start: 2023-06-22 | End: 2023-06-22

## 2023-06-22 RX ADMIN — SODIUM CHLORIDE 1000 ML: 9 INJECTION, SOLUTION INTRAVENOUS at 08:15

## 2023-06-22 RX ADMIN — NITROGLYCERIN 0.8 MG: 0.4 TABLET SUBLINGUAL at 09:16

## 2023-06-22 RX ADMIN — IOPAMIDOL 70 ML: 755 INJECTION, SOLUTION INTRAVENOUS at 09:05

## 2023-06-22 NOTE — PROCEDURES
0935Pt return from coronary cta. Denies CP/discomfort. VSS. Eating and drinking well. Continue to monitor. [id number] received call from [facility]. Dr Albarado Never is reviewing images for image quality for pt to possible go back for second scan. [id number]  received all from [place], scans are good. Pt can dc home. 1005D/C to home with instructions to follow up with ordering physician.

## 2023-06-22 NOTE — PROCEDURES
0740Patient arrived via for CTA coronary arteries. Allergies reviewed, instructions given to  include protocol for heart rate, b/p, necessary medications that will be given, IV site and proper breath hold during scan. Questions answered and expressed understanding confirmed. Placed on monitoring devices for heart rate and rhythm, B/P taken,  IV flushed / started, flushed and prn adapter attached. 1462 Dr Marco Agosto called, notified of pt arrival, history, results, home medications, vs & rhythm, orders received.

## 2023-06-23 ENCOUNTER — TELEPHONE (OUTPATIENT)
Dept: CARDIOLOGY CLINIC | Age: 58
End: 2023-06-23

## 2023-06-23 NOTE — TELEPHONE ENCOUNTER
----- Message from Fritz Colin MD sent at 6/23/2023 12:53 PM EDT -----  CT heart showed mild blockage ~30%, medical therapy only at this time, follow up with as scheduled and also follow up with Dr. Alejandro Pond.

## 2023-07-25 ENCOUNTER — OFFICE VISIT (OUTPATIENT)
Dept: FAMILY MEDICINE CLINIC | Age: 58
End: 2023-07-25
Payer: COMMERCIAL

## 2023-07-25 VITALS
HEART RATE: 98 BPM | TEMPERATURE: 97.3 F | RESPIRATION RATE: 22 BRPM | BODY MASS INDEX: 39.27 KG/M2 | OXYGEN SATURATION: 96 % | DIASTOLIC BLOOD PRESSURE: 88 MMHG | HEIGHT: 64 IN | WEIGHT: 230 LBS | SYSTOLIC BLOOD PRESSURE: 140 MMHG

## 2023-07-25 DIAGNOSIS — R30.0 DYSURIA: ICD-10-CM

## 2023-07-25 DIAGNOSIS — N39.0 URINARY TRACT INFECTION WITH HEMATURIA, SITE UNSPECIFIED: Primary | ICD-10-CM

## 2023-07-25 DIAGNOSIS — R31.9 URINARY TRACT INFECTION WITH HEMATURIA, SITE UNSPECIFIED: Primary | ICD-10-CM

## 2023-07-25 LAB
BILIRUBIN, POC: NORMAL
BLOOD URINE, POC: NORMAL
CLARITY, POC: NORMAL
COLOR, POC: YELLOW
GLUCOSE URINE, POC: NORMAL
KETONES, POC: NORMAL
LEUKOCYTE EST, POC: NORMAL
NITRITE, POC: NORMAL
PH, POC: 7
PROTEIN, POC: 30
SPECIFIC GRAVITY, POC: 1.02
UROBILINOGEN, POC: 0.2

## 2023-07-25 PROCEDURE — 99214 OFFICE O/P EST MOD 30 MIN: CPT | Performed by: PHYSICIAN ASSISTANT

## 2023-07-25 PROCEDURE — 3079F DIAST BP 80-89 MM HG: CPT | Performed by: PHYSICIAN ASSISTANT

## 2023-07-25 PROCEDURE — 81002 URINALYSIS NONAUTO W/O SCOPE: CPT | Performed by: PHYSICIAN ASSISTANT

## 2023-07-25 PROCEDURE — G8427 DOCREV CUR MEDS BY ELIG CLIN: HCPCS | Performed by: PHYSICIAN ASSISTANT

## 2023-07-25 PROCEDURE — G8417 CALC BMI ABV UP PARAM F/U: HCPCS | Performed by: PHYSICIAN ASSISTANT

## 2023-07-25 PROCEDURE — 1036F TOBACCO NON-USER: CPT | Performed by: PHYSICIAN ASSISTANT

## 2023-07-25 PROCEDURE — 3077F SYST BP >= 140 MM HG: CPT | Performed by: PHYSICIAN ASSISTANT

## 2023-07-25 PROCEDURE — 3017F COLORECTAL CA SCREEN DOC REV: CPT | Performed by: PHYSICIAN ASSISTANT

## 2023-07-25 RX ORDER — CEFDINIR 300 MG/1
300 CAPSULE ORAL 2 TIMES DAILY
Qty: 20 CAPSULE | Refills: 0 | Status: SHIPPED | OUTPATIENT
Start: 2023-07-25 | End: 2023-08-04

## 2023-07-25 NOTE — PROGRESS NOTES
23  Hillary Paulson : 1965 Sex: female  Age 62 y.o. Subjective:  Chief Complaint   Patient presents with    Dysuria         HPI:   Hillary Paulson , 62 y.o. female presents to [facility] care for evaluation of dysuria    HPI  61 yo female presents to [facility] for evaluation of possible UTI. The patient started with the symptoms over the last couple of days. The patient is prone to getting UTIs. The patient has some increased frequency, urgency with urination. The patient has not detected any blood associated. The patient does have some pressure in her low back area. The patient is not currently on any antibiotics. ROS:   Unless otherwise stated in this report the patient's positive and negative responses for review of systems for constitutional, eyes, ENT, cardiovascular, respiratory, gastrointestinal, neurological, , musculoskeletal, and integument systems and related systems to the presenting problem are either stated in the history of present illness or were not pertinent or were negative for the symptoms and/or complaints related to the presenting medical problem. Positives and pertinent negatives as per HPI. All others reviewed and are negative.       PMH:     Past Medical History:   Diagnosis Date    Atrial septal aneurysm     Gall bladder disease     Herniated disc     Hyperlipidemia     Hypertension     Infectious lymphangitis of deep lymphatics     Kidney stone        Past Surgical History:   Procedure Laterality Date     SECTION      WISDOM TOOTH EXTRACTION      WRIST SURGERY Bilateral 2021       Family History   Problem Relation Age of Onset    High Blood Pressure Mother     Hypertension Mother     Arthritis Mother     Heart Disease Mother     Colon Cancer Father     Cancer Sister     High Blood Pressure Brother     Heart Disease Brother     Arthritis Brother        Medications:     Current Outpatient Medications:     cefdinir (OMNICEF) 300 MG capsule, Take

## 2023-07-28 LAB
CULTURE: ABNORMAL
SPECIMEN DESCRIPTION: ABNORMAL

## 2023-08-01 ENCOUNTER — TELEPHONE (OUTPATIENT)
Dept: PRIMARY CARE CLINIC | Age: 58
End: 2023-08-01

## 2023-08-01 NOTE — TELEPHONE ENCOUNTER
Patient seen through [facility] last week and calling for urine culture results. AH not in office today. Given Cefdinir and not 100% better yet. Was told her ABX may needed changed depending on culture results. Can you please review sleep study as well scanned under procedures.

## 2023-08-08 ENCOUNTER — OFFICE VISIT (OUTPATIENT)
Dept: CARDIOLOGY CLINIC | Age: 58
End: 2023-08-08
Payer: COMMERCIAL

## 2023-08-08 VITALS
HEIGHT: 64 IN | WEIGHT: 228.6 LBS | RESPIRATION RATE: 18 BRPM | OXYGEN SATURATION: 97 % | DIASTOLIC BLOOD PRESSURE: 80 MMHG | SYSTOLIC BLOOD PRESSURE: 138 MMHG | BODY MASS INDEX: 39.03 KG/M2 | HEART RATE: 80 BPM

## 2023-08-08 DIAGNOSIS — I10 PRIMARY HYPERTENSION: Primary | ICD-10-CM

## 2023-08-08 PROCEDURE — 1036F TOBACCO NON-USER: CPT | Performed by: INTERNAL MEDICINE

## 2023-08-08 PROCEDURE — 3074F SYST BP LT 130 MM HG: CPT | Performed by: INTERNAL MEDICINE

## 2023-08-08 PROCEDURE — 93000 ELECTROCARDIOGRAM COMPLETE: CPT | Performed by: INTERNAL MEDICINE

## 2023-08-08 PROCEDURE — G8417 CALC BMI ABV UP PARAM F/U: HCPCS | Performed by: INTERNAL MEDICINE

## 2023-08-08 PROCEDURE — 3078F DIAST BP <80 MM HG: CPT | Performed by: INTERNAL MEDICINE

## 2023-08-08 PROCEDURE — 3017F COLORECTAL CA SCREEN DOC REV: CPT | Performed by: INTERNAL MEDICINE

## 2023-08-08 PROCEDURE — 99214 OFFICE O/P EST MOD 30 MIN: CPT | Performed by: INTERNAL MEDICINE

## 2023-08-08 PROCEDURE — G8427 DOCREV CUR MEDS BY ELIG CLIN: HCPCS | Performed by: INTERNAL MEDICINE

## 2023-08-08 RX ORDER — LOSARTAN POTASSIUM 25 MG/1
25 TABLET ORAL DAILY
Qty: 90 TABLET | Refills: 3 | Status: SHIPPED | OUTPATIENT
Start: 2023-08-08

## 2023-08-08 NOTE — PROGRESS NOTES
nonobstructive CAD based on coronary CTA  CAD with mild nonobstructive disease with mild plaque burden with CAC score of 94, percentile score 94  Recent history of unprovoked submassive PE, on Eliquis  Morbid obesity with possible obstructive sleep apnea  Palpitations and recent Holter monitor was normal  History of atrial septal aneurysm without any shunts. Ascending thoracic aorta aneurysmal dilatation 4.4 x 4.2 cm      Plan:   Coronary CTA  results reviewed, as above and discussed the patient,  has non obstructive CAD and mild TAA. Add Losartan 25 mg po daily for high BP and TAA. Will rpt CTA chest in 6 months. Advised to practice intermittent fasting for weight loss management. Advised to get a sleep study to rule out sleep apnea  Daily swimming to lose weight   Continue current mediations   Echo results reviewed, as above. Follow up with me in 6 months. The patient's current medication list, allergies, problem list and results of all previously ordered testing were reviewed at today's visit.   Giancarlo Banuelos MD  [facility]) Cardiology

## 2023-08-17 DIAGNOSIS — J30.2 SEASONAL ALLERGIC RHINITIS, UNSPECIFIED TRIGGER: ICD-10-CM

## 2023-08-17 RX ORDER — CHOLECALCIFEROL (VITAMIN D3) 1250 MCG
CAPSULE ORAL
Qty: 12 CAPSULE | Refills: 1 | Status: SHIPPED | OUTPATIENT
Start: 2023-08-17

## 2023-08-17 RX ORDER — METOPROLOL TARTRATE 50 MG/1
TABLET, FILM COATED ORAL
Qty: 180 TABLET | Refills: 3 | Status: SHIPPED | OUTPATIENT
Start: 2023-08-17

## 2023-08-17 RX ORDER — DULOXETIN HYDROCHLORIDE 60 MG/1
CAPSULE, DELAYED RELEASE ORAL
Qty: 90 CAPSULE | Refills: 3 | Status: SHIPPED | OUTPATIENT
Start: 2023-08-17

## 2023-08-17 RX ORDER — FLUTICASONE PROPIONATE 50 MCG
SPRAY, SUSPENSION (ML) NASAL
Qty: 3 EACH | Refills: 1 | Status: SHIPPED | OUTPATIENT
Start: 2023-08-17

## 2023-08-17 RX ORDER — ATORVASTATIN CALCIUM 40 MG/1
TABLET, FILM COATED ORAL
Qty: 90 TABLET | Refills: 3 | Status: SHIPPED | OUTPATIENT
Start: 2023-08-17

## 2023-09-20 ENCOUNTER — HOSPITAL ENCOUNTER (OUTPATIENT)
Dept: CT IMAGING | Age: 58
Discharge: HOME | End: 2023-09-22
Attending: FAMILY MEDICINE
Payer: COMMERCIAL

## 2023-09-20 ENCOUNTER — OFFICE VISIT (OUTPATIENT)
Dept: FAMILY MEDICINE CLINIC | Age: 58
End: 2023-09-20
Payer: COMMERCIAL

## 2023-09-20 ENCOUNTER — HOSPITAL ENCOUNTER (OUTPATIENT)
Age: 58
Discharge: HOME | End: 2023-09-20
Attending: FAMILY MEDICINE
Payer: COMMERCIAL

## 2023-09-20 VITALS
OXYGEN SATURATION: 97 % | HEART RATE: 85 BPM | DIASTOLIC BLOOD PRESSURE: 92 MMHG | BODY MASS INDEX: 39.44 KG/M2 | RESPIRATION RATE: 18 BRPM | WEIGHT: 231 LBS | TEMPERATURE: 97.6 F | SYSTOLIC BLOOD PRESSURE: 144 MMHG | HEIGHT: 64 IN

## 2023-09-20 DIAGNOSIS — R73.03 PREDIABETES: ICD-10-CM

## 2023-09-20 DIAGNOSIS — E78.5 HYPERLIPIDEMIA, UNSPECIFIED HYPERLIPIDEMIA TYPE: ICD-10-CM

## 2023-09-20 DIAGNOSIS — M25.531 RIGHT WRIST PAIN: ICD-10-CM

## 2023-09-20 DIAGNOSIS — I71.21 ANEURYSM OF ASCENDING AORTA WITHOUT RUPTURE (HCC): ICD-10-CM

## 2023-09-20 DIAGNOSIS — I10 PRIMARY HYPERTENSION: ICD-10-CM

## 2023-09-20 DIAGNOSIS — M25.562 ACUTE PAIN OF LEFT KNEE: ICD-10-CM

## 2023-09-20 DIAGNOSIS — R10.13 EPIGASTRIC PAIN: ICD-10-CM

## 2023-09-20 DIAGNOSIS — R10.13 EPIGASTRIC PAIN: Primary | ICD-10-CM

## 2023-09-20 LAB
ALBUMIN SERPL-MCNC: 4.3 G/DL (ref 3.5–5.2)
ALP SERPL-CCNC: 131 U/L (ref 35–104)
ALT SERPL-CCNC: 20 U/L (ref 0–32)
ANION GAP SERPL CALCULATED.3IONS-SCNC: 12 MMOL/L (ref 7–16)
AST SERPL-CCNC: 18 U/L (ref 0–31)
BASOPHILS # BLD: 0.03 K/UL (ref 0–0.2)
BASOPHILS NFR BLD: 0 % (ref 0–2)
BILIRUB SERPL-MCNC: 0.4 MG/DL (ref 0–1.2)
BUN SERPL-MCNC: 11 MG/DL (ref 6–20)
CALCIUM SERPL-MCNC: 9.7 MG/DL (ref 8.6–10.2)
CHLORIDE SERPL-SCNC: 103 MMOL/L (ref 98–107)
CHOLEST SERPL-MCNC: 173 MG/DL
CO2 SERPL-SCNC: 25 MMOL/L (ref 22–29)
CREAT SERPL-MCNC: 0.6 MG/DL (ref 0.5–1)
EOSINOPHIL # BLD: 0.08 K/UL (ref 0.05–0.5)
EOSINOPHILS RELATIVE PERCENT: 1 % (ref 0–6)
ERYTHROCYTE [DISTWIDTH] IN BLOOD BY AUTOMATED COUNT: 15.7 % (ref 11.5–15)
GFR SERPL CREATININE-BSD FRML MDRD: >60 ML/MIN/1.73M2
GLUCOSE SERPL-MCNC: 96 MG/DL (ref 74–99)
HBA1C MFR BLD: 6.4 % (ref 4–5.6)
HCT VFR BLD AUTO: 38.9 % (ref 34–48)
HDLC SERPL-MCNC: 44 MG/DL
HGB BLD-MCNC: 12 G/DL (ref 11.5–15.5)
IMM GRANULOCYTES # BLD AUTO: <0.03 K/UL (ref 0–0.58)
IMM GRANULOCYTES NFR BLD: 0 % (ref 0–5)
LDLC SERPL CALC-MCNC: 104 MG/DL
LIPASE SERPL-CCNC: 28 U/L (ref 13–60)
LYMPHOCYTES NFR BLD: 2.4 K/UL (ref 1.5–4)
LYMPHOCYTES RELATIVE PERCENT: 26 % (ref 20–42)
MCH RBC QN AUTO: 26.6 PG (ref 26–35)
MCHC RBC AUTO-ENTMCNC: 30.8 G/DL (ref 32–34.5)
MCV RBC AUTO: 86.3 FL (ref 80–99.9)
MONOCYTES NFR BLD: 0.37 K/UL (ref 0.1–0.95)
MONOCYTES NFR BLD: 4 % (ref 2–12)
NEUTROPHILS NFR BLD: 68 % (ref 43–80)
NEUTS SEG NFR BLD: 6.27 K/UL (ref 1.8–7.3)
PLATELET # BLD AUTO: 335 K/UL (ref 130–450)
PMV BLD AUTO: 9.7 FL (ref 7–12)
POTASSIUM SERPL-SCNC: 3.6 MMOL/L (ref 3.5–5)
PROT SERPL-MCNC: 7.4 G/DL (ref 6.4–8.3)
RBC # BLD AUTO: 4.51 M/UL (ref 3.5–5.5)
SODIUM SERPL-SCNC: 140 MMOL/L (ref 132–146)
TRIGL SERPL-MCNC: 123 MG/DL
VLDLC SERPL CALC-MCNC: 25 MG/DL
WBC OTHER # BLD: 9.2 K/UL (ref 4.5–11.5)

## 2023-09-20 PROCEDURE — 83036 HEMOGLOBIN GLYCOSYLATED A1C: CPT

## 2023-09-20 PROCEDURE — 6360000004 HC RX CONTRAST MEDICATION: Performed by: RADIOLOGY

## 2023-09-20 PROCEDURE — 83690 ASSAY OF LIPASE: CPT

## 2023-09-20 PROCEDURE — 3077F SYST BP >= 140 MM HG: CPT | Performed by: FAMILY MEDICINE

## 2023-09-20 PROCEDURE — G8417 CALC BMI ABV UP PARAM F/U: HCPCS | Performed by: FAMILY MEDICINE

## 2023-09-20 PROCEDURE — 80053 COMPREHEN METABOLIC PANEL: CPT

## 2023-09-20 PROCEDURE — 1036F TOBACCO NON-USER: CPT | Performed by: FAMILY MEDICINE

## 2023-09-20 PROCEDURE — 99214 OFFICE O/P EST MOD 30 MIN: CPT | Performed by: FAMILY MEDICINE

## 2023-09-20 PROCEDURE — 3080F DIAST BP >= 90 MM HG: CPT | Performed by: FAMILY MEDICINE

## 2023-09-20 PROCEDURE — G8427 DOCREV CUR MEDS BY ELIG CLIN: HCPCS | Performed by: FAMILY MEDICINE

## 2023-09-20 PROCEDURE — 3017F COLORECTAL CA SCREEN DOC REV: CPT | Performed by: FAMILY MEDICINE

## 2023-09-20 PROCEDURE — 74177 CT ABD & PELVIS W/CONTRAST: CPT

## 2023-09-20 PROCEDURE — 85025 COMPLETE CBC W/AUTO DIFF WBC: CPT

## 2023-09-20 PROCEDURE — 36415 COLL VENOUS BLD VENIPUNCTURE: CPT

## 2023-09-20 PROCEDURE — 80061 LIPID PANEL: CPT

## 2023-09-20 RX ADMIN — IOPAMIDOL 18 ML: 755 INJECTION, SOLUTION INTRAVENOUS at 18:29

## 2023-09-20 RX ADMIN — IOPAMIDOL 75 ML: 755 INJECTION, SOLUTION INTRAVENOUS at 18:29

## 2023-09-20 ASSESSMENT — ENCOUNTER SYMPTOMS
FACIAL SWELLING: 0
WHEEZING: 0
COLOR CHANGE: 0
VOMITING: 0
PHOTOPHOBIA: 0
CHEST TIGHTNESS: 0
ABDOMINAL PAIN: 1
DIARRHEA: 0
ALLERGIC/IMMUNOLOGIC NEGATIVE: 1
NAUSEA: 0
COUGH: 0
BLOOD IN STOOL: 0
BACK PAIN: 0
SINUS PRESSURE: 0
APNEA: 0
SHORTNESS OF BREATH: 0
SORE THROAT: 0

## 2023-09-20 NOTE — PROGRESS NOTES
Chief Complaint:   Chief Complaint   Patient presents with    Bry Nunez last Wednesday. Whole body hurts. Left wrist pain. Abdominal Pain     On going issue. Blood Work       Fall  The accident occurred 2 days ago. She fell from a height of 1 to 2 ft. The point of impact was the right wrist and left knee. The pain is present in the right wrist and left knee. The pain is at a severity of 4/10. The pain is moderate. Associated symptoms include abdominal pain. Pertinent negatives include no headaches, nausea or vomiting. Abdominal Pain  This is a new problem. The current episode started 1 to 4 weeks ago. The onset quality is gradual. The pain is located in the epigastric region. The pain is at a severity of 4/10. The pain is moderate. Associated symptoms include arthralgias. Pertinent negatives include no diarrhea, frequency, headaches, myalgias, nausea or vomiting. She has tried proton pump inhibitors for the symptoms. The treatment provided moderate relief.        Patient Active Problem List   Diagnosis    Palpitations    Atypical chest pain    Atrial septal aneurysm    Type 2 diabetes mellitus without complication, without long-term current use of insulin (HCC)    Hyperlipidemia    Essential hypertension    Pulmonary embolism, bilateral (HCC)    Acute respiratory failure with hypoxia (HCC)    Acute electrocardiogram changes    Pulmonary infarct (HCC)    Lactic acidosis    Cor pulmonale, acute (HCC)    Pulmonary embolus (HCC)    Right heart enlargement    S/P admn tPA in diff fac w/n last 24 hr bef adm to crnt fac    Anemia    Asymptomatic bacteriuria    Primary hypertension    Aneurysm of ascending aorta without rupture [facility])       Past Medical History:   Diagnosis Date    Atrial septal aneurysm     Gall bladder disease     Herniated disc     Hyperlipidemia     Hypertension     Infectious lymphangitis of deep lymphatics     Kidney stone        Past Surgical History:   Procedure Laterality Date

## 2023-09-28 ENCOUNTER — OFFICE VISIT (OUTPATIENT)
Dept: SURGERY | Age: 58
End: 2023-09-28
Payer: COMMERCIAL

## 2023-09-28 VITALS
HEART RATE: 68 BPM | DIASTOLIC BLOOD PRESSURE: 77 MMHG | TEMPERATURE: 97.5 F | OXYGEN SATURATION: 98 % | SYSTOLIC BLOOD PRESSURE: 136 MMHG | WEIGHT: 231 LBS | BODY MASS INDEX: 39.65 KG/M2

## 2023-09-28 DIAGNOSIS — R10.13 EPIGASTRIC PAIN: Primary | ICD-10-CM

## 2023-09-28 DIAGNOSIS — Z80.0 FAMILY HISTORY OF COLON CANCER: ICD-10-CM

## 2023-09-28 DIAGNOSIS — Z86.010 HISTORY OF COLON POLYPS: ICD-10-CM

## 2023-09-28 PROCEDURE — 99204 OFFICE O/P NEW MOD 45 MIN: CPT | Performed by: SURGERY

## 2023-09-28 PROCEDURE — 1036F TOBACCO NON-USER: CPT | Performed by: SURGERY

## 2023-09-28 PROCEDURE — G8427 DOCREV CUR MEDS BY ELIG CLIN: HCPCS | Performed by: SURGERY

## 2023-09-28 PROCEDURE — 3074F SYST BP LT 130 MM HG: CPT | Performed by: SURGERY

## 2023-09-28 PROCEDURE — 3078F DIAST BP <80 MM HG: CPT | Performed by: SURGERY

## 2023-09-28 PROCEDURE — G8417 CALC BMI ABV UP PARAM F/U: HCPCS | Performed by: SURGERY

## 2023-09-28 PROCEDURE — 3017F COLORECTAL CA SCREEN DOC REV: CPT | Performed by: SURGERY

## 2023-09-28 RX ORDER — SUCRALFATE 1 G/1
1 TABLET ORAL 3 TIMES DAILY
Qty: 120 TABLET | Refills: 3 | Status: SHIPPED | OUTPATIENT
Start: 2023-09-28

## 2023-09-28 NOTE — PROGRESS NOTES
Lab Results   Component Value Date/Time    PROTIME 13.9 03/17/2023 04:00 AM    INR 1.2 03/17/2023 04:00 AM     HgBA1c:    Lab Results   Component Value Date/Time    LABA1C 6.4 09/20/2023 04:47 PM     LIPASE:    Lab Results   Component Value Date/Time    LIPASE 28 09/20/2023 04:47 PM          Josey Jansen MD    I have examined the patient and performed the key aspects of physical exam, and reviewed the record (including laboratory findings, results, and all pertinent radiology images, which are independently reviewed and interpreted unless otherwise explicitly stated). The referring provider's notes were also reviewed. Any procedures planned or discussed as above had risks, benefits, and reasonable alternatives thoroughly discussed with the patient or responsible party. NOTE: This report, in part or full, may have been transcribed using voice recognition software. Every effort was made to ensure accuracy; however, inadvertent computerized transcription errors may be present. Please excuse any transcriptional grammatical or spelling errors that may have escaped my editorial review.     CC: Bismark Benitez, DO

## 2023-09-30 RX ORDER — MELOXICAM 15 MG/1
TABLET ORAL
Qty: 30 TABLET | Refills: 11 | OUTPATIENT
Start: 2023-09-30

## 2023-09-30 NOTE — TELEPHONE ENCOUNTER
Patients last appointment 9/20/2023.   Patients next scheduled appointment   Future Appointments   Date Time Provider [address]   1/13/2024 10:00 PM [facility] SLEEP [place] HOD   2/8/2024 10:20 AM Laurence Macedo MD [address] [facility]     refill

## 2023-10-02 ENCOUNTER — OFFICE VISIT (OUTPATIENT)
Dept: PRIMARY CARE CLINIC | Age: 58
End: 2023-10-02
Payer: COMMERCIAL

## 2023-10-02 VITALS
DIASTOLIC BLOOD PRESSURE: 86 MMHG | HEART RATE: 74 BPM | RESPIRATION RATE: 18 BRPM | SYSTOLIC BLOOD PRESSURE: 132 MMHG | BODY MASS INDEX: 39.78 KG/M2 | HEIGHT: 64 IN | TEMPERATURE: 97.6 F | OXYGEN SATURATION: 97 % | WEIGHT: 233 LBS

## 2023-10-02 DIAGNOSIS — S81.802D WOUND OF LEFT LOWER EXTREMITY, SUBSEQUENT ENCOUNTER: Primary | ICD-10-CM

## 2023-10-02 PROCEDURE — G8427 DOCREV CUR MEDS BY ELIG CLIN: HCPCS | Performed by: FAMILY MEDICINE

## 2023-10-02 PROCEDURE — 99213 OFFICE O/P EST LOW 20 MIN: CPT | Performed by: FAMILY MEDICINE

## 2023-10-02 PROCEDURE — G8417 CALC BMI ABV UP PARAM F/U: HCPCS | Performed by: FAMILY MEDICINE

## 2023-10-02 PROCEDURE — G8484 FLU IMMUNIZE NO ADMIN: HCPCS | Performed by: FAMILY MEDICINE

## 2023-10-02 PROCEDURE — 1036F TOBACCO NON-USER: CPT | Performed by: FAMILY MEDICINE

## 2023-10-02 PROCEDURE — 3017F COLORECTAL CA SCREEN DOC REV: CPT | Performed by: FAMILY MEDICINE

## 2023-10-02 PROCEDURE — 3078F DIAST BP <80 MM HG: CPT | Performed by: FAMILY MEDICINE

## 2023-10-02 PROCEDURE — 3074F SYST BP LT 130 MM HG: CPT | Performed by: FAMILY MEDICINE

## 2023-10-02 RX ORDER — CEPHALEXIN 500 MG/1
1 TABLET ORAL 3 TIMES DAILY
COMMUNITY
Start: 2023-09-30 | End: 2023-10-10

## 2023-10-02 RX ORDER — APIXABAN 5 MG/1
5 TABLET, FILM COATED ORAL 2 TIMES DAILY
Qty: 60 TABLET | Refills: 5 | Status: SHIPPED | OUTPATIENT
Start: 2023-10-02

## 2023-10-02 RX ORDER — CLINDAMYCIN HYDROCHLORIDE 300 MG/1
300 CAPSULE ORAL EVERY 6 HOURS
Qty: 40 CAPSULE | Refills: 0 | COMMUNITY
Start: 2023-09-30 | End: 2023-10-10

## 2023-10-02 ASSESSMENT — ENCOUNTER SYMPTOMS
ABDOMINAL PAIN: 0
ALLERGIC/IMMUNOLOGIC NEGATIVE: 1
WHEEZING: 0
SORE THROAT: 0
VOMITING: 0
BACK PAIN: 0
COLOR CHANGE: 0
SHORTNESS OF BREATH: 0
COUGH: 0
PHOTOPHOBIA: 0
BLOOD IN STOOL: 0
FACIAL SWELLING: 0
APNEA: 0
SINUS PRESSURE: 0
NAUSEA: 0
DIARRHEA: 0
CHEST TIGHTNESS: 0

## 2023-10-02 NOTE — PROGRESS NOTES
Chief Complaint:   Chief Complaint   Patient presents with    Follow-up     [facility] Skin Problem  This is a new problem. The current episode started in the past 7 days. The affected locations include the left lower leg. The rash is characterized by redness. Pertinent negatives include no congestion, cough, diarrhea, shortness of breath, sore throat or vomiting. Past treatments include antibiotics. The treatment provided no relief. Patient Active Problem List   Diagnosis    Palpitations    Atypical chest pain    Atrial septal aneurysm    Type 2 diabetes mellitus without complication, without long-term current use of insulin (HCC)    Hyperlipidemia    Essential hypertension    Pulmonary embolism, bilateral (HCC)    Acute respiratory failure with hypoxia (HCC)    Acute electrocardiogram changes    Pulmonary infarct (HCC)    Lactic acidosis    Cor pulmonale, acute (HCC)    Pulmonary embolus (HCC)    Right heart enlargement    S/P admn tPA in diff fac w/n last 24 hr bef adm to crnt fac    Anemia    Asymptomatic bacteriuria    Primary hypertension    Aneurysm of ascending aorta without rupture (HCC)       Past Medical History:   Diagnosis Date    Atrial septal aneurysm     Gall bladder disease     Herniated disc     Hyperlipidemia     Hypertension     Infectious lymphangitis of deep lymphatics     Kidney stone        Past Surgical History:   Procedure Laterality Date     SECTION      WISDOM TOOTH EXTRACTION      WRIST SURGERY Bilateral 2021       Current Outpatient Medications   Medication Sig Dispense Refill    Cephalexin 500 MG TABS Take 1 tablet by mouth 3 times daily      clindamycin (CLEOCIN) 300 MG capsule Take 1 capsule by mouth in the morning and 1 capsule at noon and 1 capsule in the evening and 1 capsule before bedtime. 40 capsule 0    mupirocin (BACTROBAN) 2 % ointment Apply topically 3 times daily.  30 g 0    ELIQUIS 5 MG TABS tablet TAKE 1 TABLET BY MOUTH TWICE A DAY 60

## 2023-10-03 RX ORDER — MELOXICAM 15 MG/1
TABLET ORAL
Qty: 30 TABLET | Refills: 11 | OUTPATIENT
Start: 2023-10-03

## 2023-10-10 ENCOUNTER — TELEPHONE (OUTPATIENT)
Dept: SURGERY | Age: 58
End: 2023-10-10

## 2023-10-10 NOTE — TELEPHONE ENCOUNTER
Prior Authorization Form:      DEMOGRAPHICS:                     Patient Name:  Gail Garvey  Patient :  1965            Insurance:  Payor: [employer] / Plan: [employer] [place] / Product Type: *No Product type* /   Insurance ID Number:    Payer/Plan Subscr  Sex Relation Sub.  Ins. ID Effective Group Num   1. NIXON Garvey 1965 Female Self [id number] [address] [facility] [address]         DIAGNOSIS & PROCEDURE:                       Procedure/Operation: EGD           CPT Code: 81959    Diagnosis:  Epigastric pain     ICD10 Code: R10.13    Location:  [facility]    Surgeon:  Dr Yaquelin Fernandes INFORMATION:                          Date: 23    Time: 10:30 am              [facility]                                                       Status:  Outpatient        Special Comments:         Electronically signed by Pedro Pablo Aviles MA on 10/10/2023 at 7:12 AM

## 2023-10-10 NOTE — TELEPHONE ENCOUNTER
Francois Torrez is scheduled for EGD with Dr Precious Liz on 11-08-23 at [facility]. Patient needs to be NPO after midnight the night before procedure. All surgery instructions were explained to the patient and a surgery letter was also mailed out. MA informed patient that PAT will also be calling to review pre-op instructions and medications. Patient verbalized understanding.   Electronically signed by Inessa Vance MA on 10/10/2023 at 7:11 AM

## 2023-10-13 ENCOUNTER — CLINICAL DOCUMENTATION (OUTPATIENT)
Dept: SURGERY | Age: 58
End: 2023-10-13

## 2023-10-13 NOTE — PROGRESS NOTES
Chon Velez from [facility] [place], operated by [facility] called and stated that   Dr Mavis Luke is ok with patient stopping Eliquis 2 days prior to EGD.   Electronically signed by Jhon Cantor MA on 10/13/2023 at 1:53 PM

## 2023-10-16 ENCOUNTER — OFFICE VISIT (OUTPATIENT)
Dept: PRIMARY CARE CLINIC | Age: 58
End: 2023-10-16
Payer: COMMERCIAL

## 2023-10-16 VITALS
WEIGHT: 232 LBS | DIASTOLIC BLOOD PRESSURE: 80 MMHG | BODY MASS INDEX: 39.61 KG/M2 | RESPIRATION RATE: 19 BRPM | HEIGHT: 64 IN | HEART RATE: 77 BPM | SYSTOLIC BLOOD PRESSURE: 134 MMHG | TEMPERATURE: 97.8 F | OXYGEN SATURATION: 96 %

## 2023-10-16 DIAGNOSIS — S81.802D WOUND OF LEFT LOWER EXTREMITY, SUBSEQUENT ENCOUNTER: Primary | ICD-10-CM

## 2023-10-16 PROCEDURE — G8417 CALC BMI ABV UP PARAM F/U: HCPCS | Performed by: FAMILY MEDICINE

## 2023-10-16 PROCEDURE — G8428 CUR MEDS NOT DOCUMENT: HCPCS | Performed by: FAMILY MEDICINE

## 2023-10-16 PROCEDURE — 3075F SYST BP GE 130 - 139MM HG: CPT | Performed by: FAMILY MEDICINE

## 2023-10-16 PROCEDURE — 99213 OFFICE O/P EST LOW 20 MIN: CPT | Performed by: FAMILY MEDICINE

## 2023-10-16 PROCEDURE — 1036F TOBACCO NON-USER: CPT | Performed by: FAMILY MEDICINE

## 2023-10-16 PROCEDURE — 3017F COLORECTAL CA SCREEN DOC REV: CPT | Performed by: FAMILY MEDICINE

## 2023-10-16 PROCEDURE — 3079F DIAST BP 80-89 MM HG: CPT | Performed by: FAMILY MEDICINE

## 2023-10-16 PROCEDURE — G8484 FLU IMMUNIZE NO ADMIN: HCPCS | Performed by: FAMILY MEDICINE

## 2023-10-16 ASSESSMENT — ENCOUNTER SYMPTOMS
VOMITING: 0
PHOTOPHOBIA: 0
CHEST TIGHTNESS: 0
DIARRHEA: 0
COLOR CHANGE: 1
NAUSEA: 0
SHORTNESS OF BREATH: 0
ABDOMINAL PAIN: 0
ALLERGIC/IMMUNOLOGIC NEGATIVE: 1
WHEEZING: 0
BLOOD IN STOOL: 0
BACK PAIN: 0
COUGH: 0
SINUS PRESSURE: 0
APNEA: 0
SORE THROAT: 0
FACIAL SWELLING: 0

## 2023-10-16 NOTE — PROGRESS NOTES
Chief Complaint:   Chief Complaint   Patient presents with    2 Week Follow-Up       Wound Check  She was originally treated 5 to 10 days ago. Previous treatment included IV/IM antibiotics. There has been no drainage from the wound. The redness has improved. The swelling has improved. The pain has improved.        Patient Active Problem List   Diagnosis    Palpitations    Atypical chest pain    Atrial septal aneurysm    Type 2 diabetes mellitus without complication, without long-term current use of insulin (HCC)    Hyperlipidemia    Essential hypertension    Pulmonary embolism, bilateral (HCC)    Acute respiratory failure with hypoxia (HCC)    Acute electrocardiogram changes    Pulmonary infarct (HCC)    Lactic acidosis    Cor pulmonale, acute (HCC)    Pulmonary embolus (HCC)    Right heart enlargement    S/P admn tPA in diff fac w/n last 24 hr bef adm to crnt fac    Anemia    Asymptomatic bacteriuria    Primary hypertension    Aneurysm of ascending aorta without rupture (HCC)       Past Medical History:   Diagnosis Date    Atrial septal aneurysm     Gall bladder disease     Herniated disc     Hyperlipidemia     Hypertension     Infectious lymphangitis of deep lymphatics     Kidney stone        Past Surgical History:   Procedure Laterality Date     SECTION      WISDOM TOOTH EXTRACTION      WRIST SURGERY Bilateral 2021       Current Outpatient Medications   Medication Sig Dispense Refill    ELIQUIS 5 MG TABS tablet TAKE 1 TABLET BY MOUTH TWICE A DAY 60 tablet 5    sucralfate (CARAFATE) 1 GM tablet Take 1 tablet by mouth in the morning, at noon, and at bedtime 120 tablet 3    fluticasone (FLONASE) 50 MCG/ACT nasal spray SPRAY 1 SPRAY INTO EACH NOSTRIL EVERY DAY 3 each 1    Cholecalciferol (VITAMIN D3) 1.25 MG (38521 UT) CAPS TAKE 1 CAPSULE BY MOUTH ONE TIME PER WEEK 12 capsule 1    metoprolol tartrate (LOPRESSOR) 50 MG tablet TAKE 1 TABLET BY MOUTH TWICE A  tablet 3    atorvastatin (LIPITOR) 40 MG

## 2023-10-31 RX ORDER — MELOXICAM 15 MG/1
TABLET ORAL
Qty: 30 TABLET | Refills: 11 | OUTPATIENT
Start: 2023-10-31

## 2023-11-07 ENCOUNTER — TELEPHONE (OUTPATIENT)
Dept: SURGERY | Age: 58
End: 2023-11-07

## 2023-11-07 NOTE — PROGRESS NOTES
Pt requests that Dr Selma Cespedes call sister after procedure to update her-Isamar Morgan [phone] posted to chart.

## 2023-11-07 NOTE — PROGRESS NOTES
[address] PRE-ADMISSION TESTING INSTRUCTIONS      ARRIVAL INSTRUCTIONS:  [x] Parking the day of Surgery is located in the Main Entrance lot. Upon entering the main door make an immediate right to the surgery reception desk. [x] Bring photo ID and insurance card    [] Bring in a copy of Living will or Durable Power of  papers. [x] Please be sure to arrange for responsible adult to provide transportation to and from the hospital    [x] Please arrange for responsible adult to be with you for the 24 hour period post procedure due to having anesthesia    [x] If you awake am of surgery not feeling well or have temperature >100 please call [phone]    GENERAL INSTRUCTIONS:    [x] Nothing by mouth after midnight, including gum, candy, mints or water    [x] You may brush your teeth, but do not swallow any water    [x] Take medications as instructed with 1-2 oz of water    [x] Stop herbal supplements and vitamins 5 days prior to procedure    [x] Follow preop dosing of blood thinners per physician instructions    [] Take 1/2 dose of evening insulin, but no insulin after midnight    [] No oral diabetic medications after midnight    [] If diabetic and have low blood sugar or feel symptomatic, take 1-2oz apple juice only    [x] Bring inhalers day of surgery    [] Bring C-PAP/ Bi-Pap day of surgery    [] Bring urine specimen day of surgery    [x] Shower or bath with soap, lather and rinse well, AM of Surgery, no lotion, powders or creams to surgical site    [x] Follow bowel prep as instructed per surgeon    [x] No tobacco products within 24 hours of surgery     [x] No alcohol or illegal drug use within 24 hours of surgery.     [x] Jewelry, body piercing's, eyeglasses, contact lenses and dentures are not permitted into surgery (bring cases)      [x] Please do not wear any nail polish, make up or hair products on the day of surgery    [x] You can expect a call the business day prior to

## 2023-11-07 NOTE — TELEPHONE ENCOUNTER
Patient called in yesterday to let us know that her labs from [facility] showed cardiolipin Ab iGM was 36 but normal in June. Was concerned if she could still get her EGD on 11/8. Per Dr Mary Troy, from a surgical standpoint, ok for EGD. Per patient, no new meds were prescribed for the lab results.

## 2023-11-08 ENCOUNTER — HOSPITAL ENCOUNTER (OUTPATIENT)
Age: 58
Setting detail: OUTPATIENT SURGERY
Discharge: HOME | End: 2023-11-08
Attending: SURGERY | Admitting: SURGERY
Payer: MEDICARE

## 2023-11-08 ENCOUNTER — ANESTHESIA EVENT (OUTPATIENT)
Dept: ENDOSCOPY | Age: 58
End: 2023-11-08
Payer: MEDICARE

## 2023-11-08 ENCOUNTER — ANESTHESIA (OUTPATIENT)
Dept: ENDOSCOPY | Age: 58
End: 2023-11-08
Payer: MEDICARE

## 2023-11-08 VITALS
SYSTOLIC BLOOD PRESSURE: 117 MMHG | RESPIRATION RATE: 18 BRPM | DIASTOLIC BLOOD PRESSURE: 67 MMHG | HEART RATE: 56 BPM | BODY MASS INDEX: 39.28 KG/M2 | OXYGEN SATURATION: 98 % | WEIGHT: 230.09 LBS | TEMPERATURE: 97.4 F | HEIGHT: 64 IN

## 2023-11-08 DIAGNOSIS — R10.13 EPIGASTRIC PAIN: ICD-10-CM

## 2023-11-08 DIAGNOSIS — R10.13 ABDOMINAL PAIN, EPIGASTRIC: ICD-10-CM

## 2023-11-08 PROCEDURE — 2580000003 HC RX 258: Performed by: NURSE ANESTHETIST, CERTIFIED REGISTERED

## 2023-11-08 PROCEDURE — 88341 IMHCHEM/IMCYTCHM EA ADD ANTB: CPT

## 2023-11-08 PROCEDURE — 43239 EGD BIOPSY SINGLE/MULTIPLE: CPT | Performed by: SURGERY

## 2023-11-08 PROCEDURE — 7100000010 HC PHASE II RECOVERY - FIRST 15 MIN: Performed by: SURGERY

## 2023-11-08 PROCEDURE — 88342 IMHCHEM/IMCYTCHM 1ST ANTB: CPT

## 2023-11-08 PROCEDURE — 88305 TISSUE EXAM BY PATHOLOGIST: CPT

## 2023-11-08 PROCEDURE — 2709999900 HC NON-CHARGEABLE SUPPLY: Performed by: SURGERY

## 2023-11-08 PROCEDURE — 6360000002 HC RX W HCPCS: Performed by: NURSE ANESTHETIST, CERTIFIED REGISTERED

## 2023-11-08 PROCEDURE — 88313 SPECIAL STAINS GROUP 2: CPT

## 2023-11-08 PROCEDURE — 3609012400 HC EGD TRANSORAL BIOPSY SINGLE/MULTIPLE: Performed by: SURGERY

## 2023-11-08 PROCEDURE — 3700000000 HC ANESTHESIA ATTENDED CARE: Performed by: SURGERY

## 2023-11-08 PROCEDURE — 7100000011 HC PHASE II RECOVERY - ADDTL 15 MIN: Performed by: SURGERY

## 2023-11-08 PROCEDURE — 3700000001 HC ADD 15 MINUTES (ANESTHESIA): Performed by: SURGERY

## 2023-11-08 RX ORDER — SUCRALFATE 1 G/1
1 TABLET ORAL 3 TIMES DAILY
Qty: 120 TABLET | Refills: 3 | Status: SHIPPED | OUTPATIENT
Start: 2023-11-08

## 2023-11-08 RX ORDER — PROPOFOL 10 MG/ML
INJECTION, EMULSION INTRAVENOUS PRN
Status: DISCONTINUED | OUTPATIENT
Start: 2023-11-08 | End: 2023-11-08 | Stop reason: SDUPTHER

## 2023-11-08 RX ORDER — SODIUM CHLORIDE 9 MG/ML
INJECTION, SOLUTION INTRAVENOUS CONTINUOUS PRN
Status: DISCONTINUED | OUTPATIENT
Start: 2023-11-08 | End: 2023-11-08 | Stop reason: SDUPTHER

## 2023-11-08 RX ADMIN — SODIUM CHLORIDE: 9 INJECTION, SOLUTION INTRAVENOUS at 09:56

## 2023-11-08 RX ADMIN — PROPOFOL 300 MG: 10 INJECTION, EMULSION INTRAVENOUS at 10:16

## 2023-11-08 ASSESSMENT — LIFESTYLE VARIABLES: SMOKING_STATUS: 0

## 2023-11-08 ASSESSMENT — PAIN - FUNCTIONAL ASSESSMENT
PAIN_FUNCTIONAL_ASSESSMENT: ACTIVITIES ARE NOT PREVENTED
PAIN_FUNCTIONAL_ASSESSMENT: 0-10

## 2023-11-08 ASSESSMENT — PAIN SCALES - GENERAL
PAINLEVEL_OUTOF10: 0

## 2023-11-08 ASSESSMENT — PAIN DESCRIPTION - DESCRIPTORS: DESCRIPTORS: ACHING

## 2023-11-08 NOTE — ANESTHESIA PRE PROCEDURE
Department of Anesthesiology  Preprocedure Note       Name:  Adrienne Singh   Age:  62 y.o.  :  1965                                          MRN:  [id number]         Date:  2023      Surgeon: Nely Colmenares):  Chetan Hu MD    Procedure: Procedure(s):  EGD ESOPHAGOGASTRODUODENOSCOPY    Medications prior to admission:   Prior to Admission medications    Medication Sig Start Date End Date Taking?  Authorizing Provider   ELIQUIS 5 MG TABS tablet TAKE 1 TABLET BY MOUTH TWICE A DAY 10/2/23   Mirta Holt DO   sucralfate (CARAFATE) 1 GM tablet Take 1 tablet by mouth in the morning, at noon, and at bedtime 23   Chetan Hu MD   fluticasone [facility]) 50 MCG/ACT nasal spray SPRAY 1 SPRAY INTO EACH NOSTRIL EVERY DAY 23   Mirta Holt DO   Cholecalciferol (VITAMIN D3) 1.25 MG (13695 UT) CAPS TAKE 1 CAPSULE BY MOUTH ONE TIME PER WEEK 23   Mirta Holt DO   metoprolol tartrate (LOPRESSOR) 50 MG tablet TAKE 1 TABLET BY MOUTH TWICE A DAY 23   Mirta Holt DO   atorvastatin (LIPITOR) 40 MG tablet TAKE 1 TABLET BY MOUTH EVERY DAY 23   Mirta Holt DO   DULoxetine (CYMBALTA) 60 MG extended release capsule TAKE 1 CAPSULE BY MOUTH EVERY DAY 23   Mirta Holt DO   losartan (COZAAR) 25 MG tablet Take 1 tablet by mouth daily 23   Sreekanth Villegas MD   pantoprazole (PROTONIX) 40 MG tablet TAKE 1 TABLET BY MOUTH EVERY DAY BEFORE BREAKFAST 23   Mirta Holt DO   oxybutynin (DITROPAN) 5 MG tablet Take 1 tablet by mouth 2 times daily    Provider, MD Patricio   furosemide (LASIX) 20 MG tablet TAKE 1 TABLET BY MOUTH EVERY DAY 22   Mirta Holt DO   loratadine (CLARITIN) 10 MG tablet TAKE 1 TABLET BY MOUTH EVERY DAY  Patient taking differently: as needed 22   Claude MONZON DO   tiotropium (SPIRIVA RESPIMAT) 1.25 MCG/ACT AERS inhaler Inhale 2 puffs into the lungs daily 22   Jesus Padilla MD   albuterol-ipratropium (COMBIVENT RESPIMAT)

## 2023-11-08 NOTE — ANESTHESIA POSTPROCEDURE EVALUATION
Department of Anesthesiology  Postprocedure Note    Patient: Manuel Atwood  MRN: [id number]  YOB: 1965  Date of evaluation: 11/8/2023      Procedure Summary     Date: 11/08/23 Room / Location: [facility] / [facility]    Anesthesia Start: 1008 Anesthesia Stop: 9004    Procedure: EGD BIOPSY Diagnosis:       Abdominal pain, epigastric      (Abdominal pain, epigastric [R10.13])    Surgeons: Min Garcia MD Responsible Provider: Darian Pagan MD    Anesthesia Type: MAC ASA Status: 3          Anesthesia Type: No value filed.     Lauren Phase I: Lauren Score: 10    Lauren Phase II:        Anesthesia Post Evaluation    Patient location during evaluation: PACU  Patient participation: complete - patient participated  Level of consciousness: awake  Airway patency: patent  Nausea & Vomiting: no nausea and no vomiting  Complications: no  Cardiovascular status: hemodynamically stable  Respiratory status: acceptable  Hydration status: euvolemic  Pain management: adequate

## 2023-11-08 NOTE — PROGRESS NOTES
Pt spoke with pharmacist regarding prescription .  Prescription to be sent to pt pharmacy and pt agrees to

## 2023-11-13 ENCOUNTER — TELEPHONE (OUTPATIENT)
Dept: PRIMARY CARE CLINIC | Age: 58
End: 2023-11-13

## 2023-11-13 LAB — SURGICAL PATHOLOGY REPORT: NORMAL

## 2023-11-13 NOTE — TELEPHONE ENCOUNTER
The pt is calling to see if you can call her at [phone] she got some bad news today and would really like to talk to you about it

## 2023-11-15 ENCOUNTER — TELEPHONE (OUTPATIENT)
Dept: PRIMARY CARE CLINIC | Age: 58
End: 2023-11-15

## 2023-11-15 NOTE — TELEPHONE ENCOUNTER
The pt is scheduled to get cataract surgery this Friday, she is calling to see if she should go through with it because of the cancer diagnosis and if it is okay that she doesn't stop her Eliquis

## 2023-11-16 ENCOUNTER — OFFICE VISIT (OUTPATIENT)
Dept: SURGERY | Age: 58
End: 2023-11-16
Payer: MEDICARE

## 2023-11-16 VITALS
DIASTOLIC BLOOD PRESSURE: 95 MMHG | SYSTOLIC BLOOD PRESSURE: 144 MMHG | WEIGHT: 229 LBS | HEART RATE: 110 BPM | TEMPERATURE: 98.2 F | BODY MASS INDEX: 39.09 KG/M2 | OXYGEN SATURATION: 95 % | HEIGHT: 64 IN

## 2023-11-16 DIAGNOSIS — K29.90 GASTRODUODENITIS: ICD-10-CM

## 2023-11-16 DIAGNOSIS — C16.0 PRIMARY MALIGNANT NEOPLASM OF ESOPHAGOGASTRIC JUNCTION (HCC): Primary | ICD-10-CM

## 2023-11-16 PROCEDURE — G8484 FLU IMMUNIZE NO ADMIN: HCPCS | Performed by: SURGERY

## 2023-11-16 PROCEDURE — G8428 CUR MEDS NOT DOCUMENT: HCPCS | Performed by: SURGERY

## 2023-11-16 PROCEDURE — 3078F DIAST BP <80 MM HG: CPT | Performed by: SURGERY

## 2023-11-16 PROCEDURE — 1036F TOBACCO NON-USER: CPT | Performed by: SURGERY

## 2023-11-16 PROCEDURE — 3074F SYST BP LT 130 MM HG: CPT | Performed by: SURGERY

## 2023-11-16 PROCEDURE — G8417 CALC BMI ABV UP PARAM F/U: HCPCS | Performed by: SURGERY

## 2023-11-16 PROCEDURE — 99214 OFFICE O/P EST MOD 30 MIN: CPT | Performed by: SURGERY

## 2023-11-16 PROCEDURE — 3017F COLORECTAL CA SCREEN DOC REV: CPT | Performed by: SURGERY

## 2023-11-20 LAB — SURGICAL PATHOLOGY REPORT: NORMAL

## 2023-11-22 RX ORDER — MELOXICAM 15 MG/1
TABLET ORAL
Qty: 30 TABLET | Refills: 11 | OUTPATIENT
Start: 2023-11-22

## 2023-11-25 NOTE — PROGRESS NOTES
PM    BILITOT 0.4 09/20/2023 04:47 PM    ALKPHOS 131 09/20/2023 04:47 PM    AST 18 09/20/2023 04:47 PM    ALT 20 09/20/2023 04:47 PM     PT/INR:    Lab Results   Component Value Date/Time    PROTIME 13.9 03/17/2023 04:00 AM    INR 1.2 03/17/2023 04:00 AM     HgBA1c:    Lab Results   Component Value Date/Time    LABA1C 6.4 09/20/2023 04:47 PM     LIPASE:    Lab Results   Component Value Date/Time    LIPASE 28 09/20/2023 04:47 PM          Ale Hi MD      I have examined the patient and performed the key aspects of the physical exam,and reviewed the record (including laboratory findings, results, and all pertinent radiology images, which are independently reviewed and interpreted unless otherwise explicitly stated). The referring provider's notes were also reviewed. Any procedures planned or discussed as above had risks, benefits, and reasonable alternatives thoroughly discussed with the patient or responsible party. Greater than 30 minutes spent on patient encounter more than 50% of which was face-to-face. NOTE: This report, in part or full, may have been transcribed using voice recognition software. Every effort was made to ensure accuracy; however, inadvertent computerized transcription errors may be present. Please excuse any transcriptional grammatical or spelling errors that may have escaped my editorial review.       CC: Sana Bejarano DO, Dr. Be Cristina

## 2023-11-27 ENCOUNTER — CLINICAL DOCUMENTATION (OUTPATIENT)
Dept: SURGERY | Age: 58
End: 2023-11-27

## 2023-11-28 ENCOUNTER — TELEPHONE (OUTPATIENT)
Dept: SURGERY | Age: 58
End: 2023-11-28

## 2023-11-28 NOTE — TELEPHONE ENCOUNTER
Called patient with PET scan results. Informed her that something showed up in her uterus that was on her previous CT. Should follow up with gyn. Patient does see sonido Blancogymaria t at [facility].

## 2023-11-30 RX ORDER — MELOXICAM 15 MG/1
TABLET ORAL
Qty: 30 TABLET | Refills: 11 | Status: SHIPPED | OUTPATIENT
Start: 2023-11-30

## 2023-12-09 ENCOUNTER — APPOINTMENT (OUTPATIENT)
Dept: CT IMAGING | Age: 58
End: 2023-12-09
Payer: MEDICARE

## 2023-12-09 ENCOUNTER — HOSPITAL ENCOUNTER (EMERGENCY)
Age: 58
Discharge: HOME | End: 2023-12-09
Attending: STUDENT IN AN ORGANIZED HEALTH CARE EDUCATION/TRAINING PROGRAM
Payer: MEDICARE

## 2023-12-09 VITALS
DIASTOLIC BLOOD PRESSURE: 64 MMHG | WEIGHT: 230 LBS | RESPIRATION RATE: 16 BRPM | BODY MASS INDEX: 39.27 KG/M2 | OXYGEN SATURATION: 98 % | HEART RATE: 66 BPM | SYSTOLIC BLOOD PRESSURE: 125 MMHG | TEMPERATURE: 97.7 F | HEIGHT: 64 IN

## 2023-12-09 DIAGNOSIS — S00.571A OTHER SUPERFICIAL BITE OF LIP, INITIAL ENCOUNTER: Primary | ICD-10-CM

## 2023-12-09 DIAGNOSIS — R22.0 LIP SWELLING: ICD-10-CM

## 2023-12-09 LAB
ALBUMIN SERPL-MCNC: 4.3 G/DL (ref 3.5–5.2)
ALP SERPL-CCNC: 110 U/L (ref 35–104)
ALT SERPL-CCNC: 12 U/L (ref 0–32)
ANION GAP SERPL CALCULATED.3IONS-SCNC: 11 MMOL/L (ref 7–16)
AST SERPL-CCNC: 11 U/L (ref 0–31)
BASOPHILS # BLD: 0.04 K/UL (ref 0–0.2)
BASOPHILS NFR BLD: 1 % (ref 0–2)
BILIRUB SERPL-MCNC: 0.2 MG/DL (ref 0–1.2)
BUN SERPL-MCNC: 18 MG/DL (ref 6–20)
CALCIUM SERPL-MCNC: 9.7 MG/DL (ref 8.6–10.2)
CHLORIDE SERPL-SCNC: 106 MMOL/L (ref 98–107)
CO2 SERPL-SCNC: 24 MMOL/L (ref 22–29)
CREAT SERPL-MCNC: 0.6 MG/DL (ref 0.5–1)
EOSINOPHIL # BLD: 0.06 K/UL (ref 0.05–0.5)
EOSINOPHILS RELATIVE PERCENT: 1 % (ref 0–6)
ERYTHROCYTE [DISTWIDTH] IN BLOOD BY AUTOMATED COUNT: 14.6 % (ref 11.5–15)
ERYTHROCYTE [SEDIMENTATION RATE] IN BLOOD BY WESTERGREN METHOD: 16 MM/HR (ref 0–20)
GFR SERPL CREATININE-BSD FRML MDRD: >60 ML/MIN/1.73M2
GLUCOSE SERPL-MCNC: 115 MG/DL (ref 74–99)
HCT VFR BLD AUTO: 40.3 % (ref 34–48)
HGB BLD-MCNC: 12.5 G/DL (ref 11.5–15.5)
IMM GRANULOCYTES # BLD AUTO: <0.03 K/UL (ref 0–0.58)
IMM GRANULOCYTES NFR BLD: 0 % (ref 0–5)
LYMPHOCYTES NFR BLD: 2.1 K/UL (ref 1.5–4)
LYMPHOCYTES RELATIVE PERCENT: 24 % (ref 20–42)
MAGNESIUM SERPL-MCNC: 2.1 MG/DL (ref 1.6–2.6)
MCH RBC QN AUTO: 27.4 PG (ref 26–35)
MCHC RBC AUTO-ENTMCNC: 31 G/DL (ref 32–34.5)
MCV RBC AUTO: 88.2 FL (ref 80–99.9)
MONOCYTES NFR BLD: 0.46 K/UL (ref 0.1–0.95)
MONOCYTES NFR BLD: 5 % (ref 2–12)
NEUTROPHILS NFR BLD: 69 % (ref 43–80)
NEUTS SEG NFR BLD: 5.91 K/UL (ref 1.8–7.3)
PLATELET # BLD AUTO: 369 K/UL (ref 130–450)
PMV BLD AUTO: 10.1 FL (ref 7–12)
POTASSIUM SERPL-SCNC: 3.6 MMOL/L (ref 3.5–5)
PROT SERPL-MCNC: 7.2 G/DL (ref 6.4–8.3)
RBC # BLD AUTO: 4.57 M/UL (ref 3.5–5.5)
SODIUM SERPL-SCNC: 141 MMOL/L (ref 132–146)
WBC OTHER # BLD: 8.6 K/UL (ref 4.5–11.5)

## 2023-12-09 PROCEDURE — 80053 COMPREHEN METABOLIC PANEL: CPT

## 2023-12-09 PROCEDURE — 70491 CT SOFT TISSUE NECK W/DYE: CPT

## 2023-12-09 PROCEDURE — 96374 THER/PROPH/DIAG INJ IV PUSH: CPT

## 2023-12-09 PROCEDURE — 6360000004 HC RX CONTRAST MEDICATION: Performed by: RADIOLOGY

## 2023-12-09 PROCEDURE — 96375 TX/PRO/DX INJ NEW DRUG ADDON: CPT

## 2023-12-09 PROCEDURE — 6370000000 HC RX 637 (ALT 250 FOR IP): Performed by: STUDENT IN AN ORGANIZED HEALTH CARE EDUCATION/TRAINING PROGRAM

## 2023-12-09 PROCEDURE — 86140 C-REACTIVE PROTEIN: CPT

## 2023-12-09 PROCEDURE — 99285 EMERGENCY DEPT VISIT HI MDM: CPT

## 2023-12-09 PROCEDURE — 85025 COMPLETE CBC W/AUTO DIFF WBC: CPT

## 2023-12-09 PROCEDURE — 6360000002 HC RX W HCPCS: Performed by: STUDENT IN AN ORGANIZED HEALTH CARE EDUCATION/TRAINING PROGRAM

## 2023-12-09 PROCEDURE — 85652 RBC SED RATE AUTOMATED: CPT

## 2023-12-09 PROCEDURE — 83735 ASSAY OF MAGNESIUM: CPT

## 2023-12-09 RX ORDER — AMOXICILLIN AND CLAVULANATE POTASSIUM 875; 125 MG/1; MG/1
1 TABLET, FILM COATED ORAL 2 TIMES DAILY
Qty: 14 TABLET | Refills: 0 | Status: SHIPPED | OUTPATIENT
Start: 2023-12-09 | End: 2023-12-16

## 2023-12-09 RX ORDER — DEXAMETHASONE SODIUM PHOSPHATE 10 MG/ML
10 INJECTION INTRAMUSCULAR; INTRAVENOUS ONCE
Status: COMPLETED | OUTPATIENT
Start: 2023-12-09 | End: 2023-12-09

## 2023-12-09 RX ORDER — AMOXICILLIN AND CLAVULANATE POTASSIUM 875; 125 MG/1; MG/1
1 TABLET, FILM COATED ORAL ONCE
Status: COMPLETED | OUTPATIENT
Start: 2023-12-09 | End: 2023-12-09

## 2023-12-09 RX ORDER — KETOROLAC TROMETHAMINE 30 MG/ML
30 INJECTION, SOLUTION INTRAMUSCULAR; INTRAVENOUS ONCE
Status: COMPLETED | OUTPATIENT
Start: 2023-12-09 | End: 2023-12-09

## 2023-12-09 RX ADMIN — AMOXICILLIN AND CLAVULANATE POTASSIUM 1 TABLET: 875; 125 TABLET, FILM COATED ORAL at 22:33

## 2023-12-09 RX ADMIN — IOPAMIDOL 75 ML: 755 INJECTION, SOLUTION INTRAVENOUS at 21:07

## 2023-12-09 RX ADMIN — DEXAMETHASONE SODIUM PHOSPHATE 10 MG: 10 INJECTION INTRAMUSCULAR; INTRAVENOUS at 20:05

## 2023-12-09 RX ADMIN — KETOROLAC TROMETHAMINE 30 MG: 30 INJECTION, SOLUTION INTRAMUSCULAR; INTRAVENOUS at 19:57

## 2023-12-09 ASSESSMENT — PAIN DESCRIPTION - DESCRIPTORS: DESCRIPTORS: DISCOMFORT

## 2023-12-09 ASSESSMENT — PAIN DESCRIPTION - LOCATION
LOCATION: MOUTH
LOCATION: FACE;THROAT

## 2023-12-09 ASSESSMENT — PAIN - FUNCTIONAL ASSESSMENT
PAIN_FUNCTIONAL_ASSESSMENT: 0-10
PAIN_FUNCTIONAL_ASSESSMENT: NONE - DENIES PAIN

## 2023-12-09 ASSESSMENT — PAIN SCALES - GENERAL
PAINLEVEL_OUTOF10: 5
PAINLEVEL_OUTOF10: 8

## 2023-12-09 ASSESSMENT — PAIN DESCRIPTION - ORIENTATION: ORIENTATION: LOWER

## 2023-12-10 LAB — CRP SERPL HS-MCNC: 6 MG/L (ref 0–5)

## 2023-12-10 ASSESSMENT — ENCOUNTER SYMPTOMS
SORE THROAT: 0
STRIDOR: 0
ABDOMINAL PAIN: 0
DIARRHEA: 0
TROUBLE SWALLOWING: 0
NAUSEA: 0
FACIAL SWELLING: 1
VOMITING: 0
COUGH: 0
SHORTNESS OF BREATH: 0
WHEEZING: 0

## 2023-12-10 NOTE — ED PROVIDER NOTES
Obeys commands  Jonathan Coma Scale Score: 15                [facility] Assessment  BP: 125/64  Pulse: 66           PHYSICAL EXAM  1 or more Elements     Physical Exam  Vitals and nursing note reviewed. Constitutional:       General: She is not in acute distress. Appearance: She is not toxic-appearing. HENT:      Head: Normocephalic and atraumatic. Right Ear: External ear normal.      Left Ear: External ear normal.      Nose: Nose normal. No rhinorrhea. Mouth/Throat:      Mouth: Mucous membranes are moist.      Pharynx: Oropharynx is clear. Comments: Trace swelling to lower lip, with bite wound to the inner lip noted as pictured; oropharynx is otherwise clear and patent without any edema elsewhere, no drooling stridor or trismus, no swelling or tenderness to the floor of the mouth; mild swelling and tenderness noted to the submandibular region, no redness warmth crepitance or overlying skin changes. Eyes:      Extraocular Movements: Extraocular movements intact. Conjunctiva/sclera: Conjunctivae normal.      Pupils: Pupils are equal, round, and reactive to light. Cardiovascular:      Rate and Rhythm: Normal rate and regular rhythm. Pulses: Normal pulses. Heart sounds: Normal heart sounds. Pulmonary:      Effort: Pulmonary effort is normal. No respiratory distress. Breath sounds: Normal breath sounds. No wheezing or rales. Abdominal:      Palpations: Abdomen is soft. Tenderness: There is no abdominal tenderness. There is no guarding. Musculoskeletal:         General: Normal range of motion. Cervical back: Normal range of motion and neck supple. Right lower leg: No edema. Left lower leg: No edema. Skin:     General: Skin is warm and dry. Capillary Refill: Capillary refill takes less than 2 seconds. Coloration: Skin is not jaundiced or pale. Neurological:      General: No focal deficit present.       Mental Status: She is alert and oriented to

## 2023-12-11 ENCOUNTER — OFFICE VISIT (OUTPATIENT)
Dept: PRIMARY CARE CLINIC | Age: 58
End: 2023-12-11
Payer: MEDICARE

## 2023-12-11 VITALS
BODY MASS INDEX: 39.78 KG/M2 | WEIGHT: 233 LBS | TEMPERATURE: 97.6 F | RESPIRATION RATE: 18 BRPM | DIASTOLIC BLOOD PRESSURE: 86 MMHG | SYSTOLIC BLOOD PRESSURE: 124 MMHG | HEART RATE: 64 BPM | OXYGEN SATURATION: 99 % | HEIGHT: 64 IN

## 2023-12-11 DIAGNOSIS — F41.9 ANXIETY: ICD-10-CM

## 2023-12-11 DIAGNOSIS — J02.9 PHARYNGITIS, UNSPECIFIED ETIOLOGY: Primary | ICD-10-CM

## 2023-12-11 DIAGNOSIS — C15.9 MALIGNANT NEOPLASM OF ESOPHAGUS, UNSPECIFIED LOCATION (HCC): ICD-10-CM

## 2023-12-11 PROCEDURE — 3074F SYST BP LT 130 MM HG: CPT | Performed by: FAMILY MEDICINE

## 2023-12-11 PROCEDURE — 3079F DIAST BP 80-89 MM HG: CPT | Performed by: FAMILY MEDICINE

## 2023-12-11 PROCEDURE — 1036F TOBACCO NON-USER: CPT | Performed by: FAMILY MEDICINE

## 2023-12-11 PROCEDURE — G8484 FLU IMMUNIZE NO ADMIN: HCPCS | Performed by: FAMILY MEDICINE

## 2023-12-11 PROCEDURE — 3017F COLORECTAL CA SCREEN DOC REV: CPT | Performed by: FAMILY MEDICINE

## 2023-12-11 PROCEDURE — G8427 DOCREV CUR MEDS BY ELIG CLIN: HCPCS | Performed by: FAMILY MEDICINE

## 2023-12-11 PROCEDURE — G8417 CALC BMI ABV UP PARAM F/U: HCPCS | Performed by: FAMILY MEDICINE

## 2023-12-11 PROCEDURE — 99214 OFFICE O/P EST MOD 30 MIN: CPT | Performed by: FAMILY MEDICINE

## 2023-12-11 RX ORDER — BUSPIRONE HYDROCHLORIDE 10 MG/1
10 TABLET ORAL 2 TIMES DAILY
Qty: 60 TABLET | Refills: 3 | Status: SHIPPED | OUTPATIENT
Start: 2023-12-11 | End: 2024-01-10

## 2023-12-11 ASSESSMENT — ENCOUNTER SYMPTOMS
SWOLLEN GLANDS: 1
APNEA: 0
COLOR CHANGE: 0
ALLERGIC/IMMUNOLOGIC NEGATIVE: 1
NAUSEA: 0
BACK PAIN: 0
DIARRHEA: 0
COUGH: 0
SINUS PRESSURE: 0
SORE THROAT: 1
VOMITING: 0
BLOOD IN STOOL: 0
WHEEZING: 0
FACIAL SWELLING: 0
SHORTNESS OF BREATH: 0
ABDOMINAL PAIN: 0
PHOTOPHOBIA: 0
CHEST TIGHTNESS: 0

## 2023-12-11 NOTE — PROGRESS NOTES
Chief Complaint:   Chief Complaint   Patient presents with    ED Follow-up     Had test done and then mouth swelled up. Pharyngitis  This is a new problem. The current episode started in the past 7 days. The problem occurs constantly. Associated symptoms include a sore throat and swollen glands. Pertinent negatives include no abdominal pain, arthralgias, chest pain, congestion, coughing, headaches, joint swelling, myalgias, nausea, rash, vomiting or weakness. Anxiety  Presents for initial visit. Onset was 1 to 4 weeks ago. The problem has been gradually worsening. Symptoms include nervous/anxious behavior. Patient reports no chest pain, confusion, nausea, palpitations or shortness of breath. Primary symptoms comment: anxiety. Mental Health Problem  The primary symptoms include dysphoric mood. Primary symptoms comment: anxiety. The current episode started 1 to 2 weeks ago. This is a chronic problem. The onset of the illness is precipitated by a stressful event. The degree of incapacity that she is experiencing as a consequence of her illness is moderate. Additional symptoms of the illness do not include agitation, headaches or abdominal pain. She does not admit to suicidal ideas. She does not have a plan to attempt suicide. She does not contemplate harming herself. She has not already injured self. She does not contemplate injuring another person. She has not already  injured another person.        Patient Active Problem List   Diagnosis    Palpitations    Atypical chest pain    Atrial septal aneurysm    Type 2 diabetes mellitus without complication, without long-term current use of insulin (HCC)    Hyperlipidemia    Essential hypertension    Pulmonary embolism, bilateral (HCC)    Acute respiratory failure with hypoxia (HCC)    Acute electrocardiogram changes    Pulmonary infarct ([facility])    Lactic acidosis    Cor pulmonale, acute (HCC)    Pulmonary embolus (HCC)    Right heart enlargement    S/P admn

## 2023-12-15 DIAGNOSIS — S81.802D WOUND OF LEFT LOWER EXTREMITY, SUBSEQUENT ENCOUNTER: ICD-10-CM

## 2023-12-29 ENCOUNTER — HOSPITAL ENCOUNTER (OUTPATIENT)
Dept: RADIATION ONCOLOGY | Age: 58
Discharge: HOME | End: 2023-12-29
Payer: MEDICARE

## 2023-12-29 VITALS
TEMPERATURE: 97.7 F | RESPIRATION RATE: 18 BRPM | BODY MASS INDEX: 39.22 KG/M2 | HEART RATE: 64 BPM | DIASTOLIC BLOOD PRESSURE: 92 MMHG | SYSTOLIC BLOOD PRESSURE: 137 MMHG | WEIGHT: 228.6 LBS

## 2023-12-29 DIAGNOSIS — C15.5 MALIGNANT NEOPLASM OF LOWER THIRD OF ESOPHAGUS (HCC): Primary | ICD-10-CM

## 2023-12-29 PROCEDURE — 99205 OFFICE O/P NEW HI 60 MIN: CPT

## 2023-12-29 PROCEDURE — 99205 OFFICE O/P NEW HI 60 MIN: CPT | Performed by: RADIOLOGY

## 2023-12-29 NOTE — PROGRESS NOTES
recommended. The patient was referred to me by Dr. Nay Harper for discussion and consideration regarding neoadjuvant treatment with concurrent chemoradiation according to the CROSS trial regimen with CarboTaxol. Today the patient is feeling relatively well, she does walk with a cane, or on a wheelchair, she complains of a couple of pounds of weight loss, she has some moderate difficulty swallowing especially for dry food like bread, rice, steak and shrimps. She has some nocturnal vision difficulty. Her major complaint is of a lot of family issues as well as personal and family health stressors. PAST MEDICAL HISTORY:      Diagnosis Date    Aorta disorder ([address])     ascending aortic dilation 4.3 cm echo 10/6/23 per Vascular note [facility] 10/20/23 in Epic    Atrial septal aneurysm     Cancer ([address])     Lyssa Hacking bladder disease     Herniated disc     Hyperlipidemia     Hypertension     Infectious lymphangitis of deep lymphatics     Kidney stone     CHRISTIAN (obstructive sleep apnea)     will be fitted in 2023 for cpap per pt    PE (pulmonary thromboembolism) ([address]) 2023    . On Eliquis follows Vascular at [facility] last seen 10/6/23 note in EPIC    Pulmonary hypertension ([address])     Sleep apnea        PAST SURGICAL HISTORY:      Procedure Laterality Date    CATARACT EXTRACTION      bilateral 2 weeks apart with lense implant     SECTION      COLONOSCOPY      states she is overdue    UPPER GASTROINTESTINAL ENDOSCOPY N/A 2023    EGD BIOPSY performed by Mert Peña MD at [address] [place] [address] Right 2021       Allergies   Allergen Reactions    Quinolones Other (See Comments)     This appears to be a new Dilatation of the ascending aorta measuring up to 4.3 cm  - will defer further floroquinonlones? ??  Previous CTA in 2019-- Aorta: Normal. No aortic aneurysm. No aortic dissection.    New dilatation of the ascending aorta measuring up to 4.3 cm  Preventative measure
Any changes in memory, attention/focus that impact daily activities: No     Do you avoid participation in leisure/social activity due to weakness, fatigue or pain: No     ARE ANY OF THE ABOVE ARE ANSWERED YES: No          PT ASSESSMENT FOR REFERRAL    Have you had any recent falls in the past 2 months: No     Do you have difficulty going up/down stairs: Yes     Are you having difficulty walking: Yes, long distance     Do you often hold onto furniture/environmental supports or feel off balance when you are walking: Yes, using a cane     Do you need to take rest breaks when you are walking: Yes     Any pain on a scale of 1-10 that limits your mobility: Yes 8/10    ARE ANY OF THE ABOVE ARE ANSWERED YES: Yes - but NO PT referral request sent due to not at this time. PELVIC FLOOR DYSFUNCTION SCREENING ASSESSMENT    - Do you have any pelvic pain? No     - Do you have any fecal constipation or fecal incontinence? Yes , loose bowels    - Do you have any urinary incontinence or difficulty starting to urinate? Yes     ARE ANY OF THE ABOVE ARE ANSWERED YES: Yes - but NO PT referral request sent due to not at this time. PREHAB AUDIOLOGY REFERRAL    - Is patient planned to receive Cisplatin? No. This patient is not planned to start Cisplatin. - Is patient planned to receive radiation therapy that may be directed toward auditory canals or nerves? No. Patient is not planned to start radiation therapy to auditory canals or nerves. - Is patient complaining of new onset hearing loss? No. Patient is not complaining of new onset hearing loss. Patient education given on radiation therapy side effects and fall prevention safety utilizing slides and handout. The patient expresses understanding and acceptance of instructions.  Ravin Hilton RN 12/29/2023 1:41 PM           Ravin Hilton RN
Ambulatory

## 2024-01-02 ENCOUNTER — TELEPHONE (OUTPATIENT)
Dept: PRIMARY CARE CLINIC | Age: 59
End: 2024-01-02

## 2024-01-02 DIAGNOSIS — E11.9 TYPE 2 DIABETES MELLITUS WITHOUT COMPLICATION, WITHOUT LONG-TERM CURRENT USE OF INSULIN (HCC): Primary | ICD-10-CM

## 2024-01-02 NOTE — TELEPHONE ENCOUNTER
Pt calling asking if you can order an A1C for her to have done. Pt wants to have it checked before getting chemo and radiation to keep track of her levels.

## 2024-01-10 ENCOUNTER — HOSPITAL ENCOUNTER (OUTPATIENT)
Dept: RADIATION ONCOLOGY | Age: 59
Discharge: HOME | End: 2024-01-10
Payer: MEDICARE

## 2024-01-10 PROCEDURE — 77334 RADIATION TREATMENT AID(S): CPT | Performed by: RADIOLOGY

## 2024-01-10 PROCEDURE — 6360000004 HC RX CONTRAST MEDICATION: Performed by: RADIOLOGY

## 2024-01-10 RX ADMIN — IOPAMIDOL 100 ML: 755 INJECTION, SOLUTION INTRAVENOUS at 11:57

## 2024-01-11 ENCOUNTER — TELEPHONE (OUTPATIENT)
Dept: CASE MANAGEMENT | Age: 59
End: 2024-01-11

## 2024-01-12 ENCOUNTER — TELEPHONE (OUTPATIENT)
Dept: CASE MANAGEMENT | Age: 59
End: 2024-01-12

## 2024-01-12 NOTE — TELEPHONE ENCOUNTER
Spoke with ELIZABETH Javier from Dr. Yepez's office. Start date is set at their office for 1/15. I asked if this could be moved out a week to allow for a concurrent start, but Dr. Yepez will be starting on 1/15 regardless if RT is ready. RT team made aware.

## 2024-01-19 ENCOUNTER — HOSPITAL ENCOUNTER (OUTPATIENT)
Dept: RADIATION ONCOLOGY | Age: 59
Discharge: HOME | End: 2024-01-19
Payer: MEDICARE

## 2024-01-19 PROCEDURE — 77301 RADIOTHERAPY DOSE PLAN IMRT: CPT | Performed by: RADIOLOGY

## 2024-01-19 PROCEDURE — 77338 DESIGN MLC DEVICE FOR IMRT: CPT | Performed by: RADIOLOGY

## 2024-01-19 PROCEDURE — 77300 RADIATION THERAPY DOSE PLAN: CPT | Performed by: RADIOLOGY

## 2024-01-23 DIAGNOSIS — S81.802D WOUND OF LEFT LOWER EXTREMITY, SUBSEQUENT ENCOUNTER: ICD-10-CM

## 2024-01-24 ENCOUNTER — HOSPITAL ENCOUNTER (OUTPATIENT)
Dept: RADIATION ONCOLOGY | Age: 59
Discharge: HOME | End: 2024-01-24
Payer: MEDICARE

## 2024-01-24 VITALS
SYSTOLIC BLOOD PRESSURE: 119 MMHG | DIASTOLIC BLOOD PRESSURE: 75 MMHG | HEART RATE: 62 BPM | RESPIRATION RATE: 18 BRPM | BODY MASS INDEX: 39.12 KG/M2 | WEIGHT: 228 LBS | TEMPERATURE: 98 F

## 2024-01-24 DIAGNOSIS — C15.5 MALIGNANT NEOPLASM OF LOWER THIRD OF ESOPHAGUS (HCC): Primary | ICD-10-CM

## 2024-01-24 PROCEDURE — 77386 HC NTSTY MODUL RAD TX DLVR CPLX: CPT | Performed by: RADIOLOGY

## 2024-01-24 NOTE — PROGRESS NOTES
Anita Dave  1/24/2024  Wt Readings from Last 3 Encounters:   01/24/24 103.4 kg (228 lb)   12/29/23 103.7 kg (228 lb 9.6 oz)   12/20/23 103.9 kg (229 lb)     Body mass index is 39.12 kg/m².        Treatment Area:Distal esophagus    Patient was seen today for weekly visit.      Comfort Alteration  KPS:80%  Fatigue: None    Ventilation Alterations  Cough: No  Hemoptysis: No  Mucus Color: na  Dyspnea: No  O2 Sat: 98%    Nutritional Alteration  Anorexia: No  Nausea: No   Vomiting: No     Skin Alteration   Sensation:none    Radiation Dermatitis:  none    Mucous Membrane Alteration  Voice Changes/ Stridor/Larynx: no  Pharynx & Esophagus: na    Elimination Alterations  Constipation: no  Diarrhea:  no      Emotional  Coping: effective      Injury, potential bleeding or infection: na    Other:na    Lab Results   Component Value Date    WBC 8.6 12/09/2023    HGB 12.5 12/09/2023    HCT 40.3 12/09/2023     12/09/2023         /75   Pulse 62   Temp 98 °F (36.7 °C) (Temporal)   Resp 18   Wt 103.4 kg (228 lb)   LMP 10/02/2019   BMI 39.12 kg/m²   BP within normal range? yes     Assessment/Plan: Completed 1/28 fractions; 180/5040 cGy    Antonia Osorio RN

## 2024-01-24 NOTE — PROGRESS NOTES
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    SUBJECTIVE:   Anita Dave has now received 180 cGy in 1 fractions directed to the distal esophagus.  The patient is receiving weekly concurrent chemotherapy.      Past medical, surgical, social and family histories reviewed and updated as indicated.    PAIN: No    ALLERGIES:  Quinolones         Current Outpatient Medications   Medication Sig Dispense Refill    mupirocin (BACTROBAN) 2 % ointment APPLY TO AFFECTED AREA 3 TIMES A DAY 22 g 1    meloxicam (MOBIC) 15 MG tablet TAKE 1 TABLET BY MOUTH EVERY DAY 30 tablet 11    sucralfate (CARAFATE) 1 GM tablet Take 1 tablet by mouth in the morning, at noon, and at bedtime Crush and dissolve in medicine cup full of water and drink 120 tablet 3    ELIQUIS 5 MG TABS tablet TAKE 1 TABLET BY MOUTH TWICE A DAY 60 tablet 5    fluticasone (FLONASE) 50 MCG/ACT nasal spray SPRAY 1 SPRAY INTO EACH NOSTRIL EVERY DAY 3 each 1    Cholecalciferol (VITAMIN D3) 1.25 MG (98986 UT) CAPS TAKE 1 CAPSULE BY MOUTH ONE TIME PER WEEK 12 capsule 1    metoprolol tartrate (LOPRESSOR) 50 MG tablet TAKE 1 TABLET BY MOUTH TWICE A  tablet 3    atorvastatin (LIPITOR) 40 MG tablet TAKE 1 TABLET BY MOUTH EVERY DAY 90 tablet 3    DULoxetine (CYMBALTA) 60 MG extended release capsule TAKE 1 CAPSULE BY MOUTH EVERY DAY 90 capsule 3    losartan (COZAAR) 25 MG tablet Take 1 tablet by mouth daily 90 tablet 3    pantoprazole (PROTONIX) 40 MG tablet TAKE 1 TABLET BY MOUTH EVERY DAY BEFORE BREAKFAST 30 tablet 5    oxybutynin (DITROPAN) 5 MG tablet Take 1 tablet by mouth 2 times daily      furosemide (LASIX) 20 MG tablet TAKE 1 TABLET BY MOUTH EVERY DAY 90 tablet 0    loratadine (CLARITIN) 10 MG tablet TAKE 1 TABLET BY MOUTH

## 2024-01-25 ENCOUNTER — HOSPITAL ENCOUNTER (OUTPATIENT)
Dept: RADIATION ONCOLOGY | Age: 59
Discharge: HOME | End: 2024-01-25
Payer: MEDICARE

## 2024-01-25 VITALS — SYSTOLIC BLOOD PRESSURE: 100 MMHG | DIASTOLIC BLOOD PRESSURE: 63 MMHG | RESPIRATION RATE: 18 BRPM | HEART RATE: 71 BPM

## 2024-01-25 PROCEDURE — 77386 HC NTSTY MODUL RAD TX DLVR CPLX: CPT | Performed by: RADIOLOGY

## 2024-01-25 NOTE — PROGRESS NOTES
Therapist states patient got lightheaded after treatment, she stayed for a little bit in the waiting area for few minutes, drinking water and eating crackers, she states she is feeling a little better.  She states she took her metoprolol, losartan and lasix this morning. Vitals taken, BP on the low reading.  I advised patient to get BP machine that she can use at home, she voiced understanding.  I also encouraged her to rest for a little bit before going home, she agreed.

## 2024-01-26 ENCOUNTER — HOSPITAL ENCOUNTER (OUTPATIENT)
Dept: RADIATION ONCOLOGY | Age: 59
Discharge: HOME | End: 2024-01-26
Payer: MEDICARE

## 2024-01-26 ENCOUNTER — TELEPHONE (OUTPATIENT)
Dept: RADIATION ONCOLOGY | Age: 59
End: 2024-01-26

## 2024-01-26 ENCOUNTER — TELEPHONE (OUTPATIENT)
Dept: PRIMARY CARE CLINIC | Age: 59
End: 2024-01-26

## 2024-01-26 VITALS — SYSTOLIC BLOOD PRESSURE: 96 MMHG | DIASTOLIC BLOOD PRESSURE: 61 MMHG

## 2024-01-26 PROCEDURE — 77386 HC NTSTY MODUL RAD TX DLVR CPLX: CPT | Performed by: RADIOLOGY

## 2024-01-26 NOTE — TELEPHONE ENCOUNTER
This RN called Dr Rivers's office spoke to Elza re:  patient's BP running low for the last 2 days and patient states she gets lightheaded with changed in position from laying down flat with treatment to sitting up.  I gave Elza the 2 readings 100/63 and 96/61.  She states today is not as bad compared to yesterday, she ate better today too, Elza will notify Dr Rivers.  I advised patient to buy a BP machine so she can monitor her BP at home, she voiced understanding.

## 2024-01-29 ENCOUNTER — APPOINTMENT (OUTPATIENT)
Dept: RADIATION ONCOLOGY | Age: 59
End: 2024-01-29
Payer: COMMERCIAL

## 2024-01-30 ENCOUNTER — APPOINTMENT (OUTPATIENT)
Dept: RADIATION ONCOLOGY | Age: 59
End: 2024-01-30
Payer: COMMERCIAL

## 2024-01-30 ENCOUNTER — OFFICE VISIT (OUTPATIENT)
Dept: FAMILY MEDICINE CLINIC | Age: 59
End: 2024-01-30
Payer: MEDICARE

## 2024-01-30 VITALS
SYSTOLIC BLOOD PRESSURE: 120 MMHG | OXYGEN SATURATION: 99 % | BODY MASS INDEX: 38.94 KG/M2 | HEART RATE: 70 BPM | TEMPERATURE: 97.2 F | WEIGHT: 227 LBS | DIASTOLIC BLOOD PRESSURE: 82 MMHG

## 2024-01-30 DIAGNOSIS — N30.01 ACUTE CYSTITIS WITH HEMATURIA: Primary | ICD-10-CM

## 2024-01-30 DIAGNOSIS — R30.0 DYSURIA: ICD-10-CM

## 2024-01-30 LAB
BILIRUBIN, POC: NORMAL
BLOOD URINE, POC: NORMAL
CLARITY, POC: NORMAL
COLOR, POC: YELLOW
GLUCOSE URINE, POC: NORMAL
KETONES, POC: NORMAL
LEUKOCYTE EST, POC: NORMAL
NITRITE, POC: POSITIVE
PH, POC: 6
PROTEIN, POC: NORMAL
SPECIFIC GRAVITY, POC: >=1.02
UROBILINOGEN, POC: 0.2

## 2024-01-30 PROCEDURE — 99213 OFFICE O/P EST LOW 20 MIN: CPT

## 2024-01-30 PROCEDURE — 77336 RADIATION PHYSICS CONSULT: CPT | Performed by: RADIOLOGY

## 2024-01-30 PROCEDURE — G8427 DOCREV CUR MEDS BY ELIG CLIN: HCPCS

## 2024-01-30 PROCEDURE — 81002 URINALYSIS NONAUTO W/O SCOPE: CPT

## 2024-01-30 PROCEDURE — 1036F TOBACCO NON-USER: CPT

## 2024-01-30 PROCEDURE — 3074F SYST BP LT 130 MM HG: CPT

## 2024-01-30 PROCEDURE — 77014 CHG CT GUIDANCE RADIATION THERAPY FLDS PLACEMENT: CPT | Performed by: RADIOLOGY

## 2024-01-30 PROCEDURE — G8417 CALC BMI ABV UP PARAM F/U: HCPCS

## 2024-01-30 PROCEDURE — 77427 RADIATION TX MANAGEMENT X5: CPT | Performed by: RADIOLOGY

## 2024-01-30 PROCEDURE — 3017F COLORECTAL CA SCREEN DOC REV: CPT

## 2024-01-30 PROCEDURE — 3079F DIAST BP 80-89 MM HG: CPT

## 2024-01-30 PROCEDURE — G8484 FLU IMMUNIZE NO ADMIN: HCPCS

## 2024-01-30 PROCEDURE — 77386 HC NTSTY MODUL RAD TX DLVR CPLX: CPT | Performed by: RADIOLOGY

## 2024-01-30 RX ORDER — CEFDINIR 300 MG/1
300 CAPSULE ORAL 2 TIMES DAILY
Qty: 14 CAPSULE | Refills: 0 | Status: SHIPPED | OUTPATIENT
Start: 2024-01-30 | End: 2024-02-06

## 2024-01-30 NOTE — PROGRESS NOTES
Chief Complaint:   Dysuria and Urinary Frequency      History of Present Illness   Source of history provided by:  patient.    Anita Dave is a 58 y.o. old female who presents to [facility] care for urinary frequency, which occured a few day(s) prior to arrival. Symptoms are associated with frequency.  She has a history of no complicating symptoms. Denies gross hematuria.  Denies associated flank pain. Denies any fever, chills, vaginal discharge, vaginal bleeding, possibility of pregnancy, vomiting, diarrhea, or lethargy.  Patient's last menstrual period was 10/02/2019.    Review of Systems   Unless otherwise stated in this report or unable to obtain because of the patient's clinical or mental status as evidenced by the medical record, this patients's positive and negative responses for Review of Systems, constitutional, psych, eyes, ENT, cardiovascular, respiratory, gastrointestinal, neurological, genitourinary, musculoskeletal, integument systems and systems related to the presenting problem are either stated in the preceding or were not pertinent or were negative for the symptoms and/or complaints related to the medical problem.    Past Medical History:  has a past medical history of Aorta disorder (HCC), Atrial septal aneurysm, Cancer (HCC), Gall bladder disease, Herniated disc, Hyperlipidemia, Hypertension, Infectious lymphangitis of deep lymphatics, Kidney stone, CHRISTIAN (obstructive sleep apnea), PE (pulmonary thromboembolism) (HCC), Pulmonary hypertension (HCC), and Sleep apnea.   Past Surgical History:  has a past surgical history that includes  section (); Fowler tooth extraction; Wrist surgery (Right, 2021); Upper gastrointestinal endoscopy (N/A, 2023); Colonoscopy; and Cataract extraction.  Social History:  reports that she has never smoked. She has never used smokeless tobacco. She reports that she does not drink alcohol and does not use drugs.  Family History: family history

## 2024-01-31 ENCOUNTER — APPOINTMENT (OUTPATIENT)
Dept: RADIATION ONCOLOGY | Age: 59
End: 2024-01-31
Payer: COMMERCIAL

## 2024-01-31 VITALS
TEMPERATURE: 97.6 F | SYSTOLIC BLOOD PRESSURE: 124 MMHG | RESPIRATION RATE: 18 BRPM | HEART RATE: 81 BPM | DIASTOLIC BLOOD PRESSURE: 86 MMHG | BODY MASS INDEX: 39.12 KG/M2 | WEIGHT: 228 LBS

## 2024-01-31 DIAGNOSIS — C15.5 MALIGNANT NEOPLASM OF LOWER THIRD OF ESOPHAGUS (HCC): Primary | ICD-10-CM

## 2024-01-31 PROCEDURE — 77014 CHG CT GUIDANCE RADIATION THERAPY FLDS PLACEMENT: CPT | Performed by: RADIOLOGY

## 2024-01-31 PROCEDURE — 77386 HC NTSTY MODUL RAD TX DLVR CPLX: CPT | Performed by: RADIOLOGY

## 2024-01-31 ASSESSMENT — PAIN DESCRIPTION - DESCRIPTORS: DESCRIPTORS: OTHER (COMMENT)

## 2024-01-31 ASSESSMENT — PAIN DESCRIPTION - PAIN TYPE: TYPE: CHRONIC PAIN

## 2024-01-31 ASSESSMENT — PAIN SCALES - GENERAL: PAINLEVEL_OUTOF10: 5

## 2024-01-31 ASSESSMENT — PAIN DESCRIPTION - LOCATION: LOCATION: ABDOMEN

## 2024-01-31 NOTE — PROGRESS NOTES
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    SUBJECTIVE:   Anita Dave has now received 1080 cGy in 6 fractions directed to the distal esophagus.      Past medical, surgical, social and family histories reviewed and updated as indicated.    PAIN: No    ALLERGIES:  Quinolones         Current Outpatient Medications   Medication Sig Dispense Refill    cefdinir (OMNICEF) 300 MG capsule Take 1 capsule by mouth 2 times daily for 7 days 14 capsule 0    mupirocin (BACTROBAN) 2 % ointment APPLY TO AFFECTED AREA 3 TIMES A DAY 22 g 1    meloxicam (MOBIC) 15 MG tablet TAKE 1 TABLET BY MOUTH EVERY DAY 30 tablet 11    sucralfate (CARAFATE) 1 GM tablet Take 1 tablet by mouth in the morning, at noon, and at bedtime Crush and dissolve in medicine cup full of water and drink 120 tablet 3    ELIQUIS 5 MG TABS tablet TAKE 1 TABLET BY MOUTH TWICE A DAY 60 tablet 5    fluticasone (FLONASE) 50 MCG/ACT nasal spray SPRAY 1 SPRAY INTO EACH NOSTRIL EVERY DAY 3 each 1    Cholecalciferol (VITAMIN D3) 1.25 MG (95168 UT) CAPS TAKE 1 CAPSULE BY MOUTH ONE TIME PER WEEK 12 capsule 1    metoprolol tartrate (LOPRESSOR) 50 MG tablet TAKE 1 TABLET BY MOUTH TWICE A  tablet 3    atorvastatin (LIPITOR) 40 MG tablet TAKE 1 TABLET BY MOUTH EVERY DAY 90 tablet 3    DULoxetine (CYMBALTA) 60 MG extended release capsule TAKE 1 CAPSULE BY MOUTH EVERY DAY 90 capsule 3    pantoprazole (PROTONIX) 40 MG tablet TAKE 1 TABLET BY MOUTH EVERY DAY BEFORE BREAKFAST 30 tablet 5    oxybutynin (DITROPAN) 5 MG tablet Take 1 tablet by mouth 2 times daily      furosemide (LASIX) 20 MG tablet TAKE 1 TABLET BY MOUTH EVERY DAY 90 tablet 0    loratadine (CLARITIN) 10 MG tablet TAKE 1 TABLET BY MOUTH EVERY DAY (Patient taking

## 2024-01-31 NOTE — PROGRESS NOTES
Aniat Dave  1/31/2024  Wt Readings from Last 3 Encounters:   01/31/24 103.4 kg (228 lb)   01/30/24 103 kg (227 lb)   01/24/24 103.4 kg (228 lb)     Body mass index is 39.12 kg/m².        Treatment Area:distal esophagus    Patient was seen today for weekly visit.      Comfort Alteration  KPS:80%  Fatigue: Mild    Ventilation Alterations  Cough: No  Hemoptysis: No  Mucus Color: na  Dyspnea: No  O2 Sat: 98%    Nutritional Alteration  Anorexia: No  Nausea: No   Vomiting: No     Skin Alteration   Sensation:none    Radiation Dermatitis:  none    Mucous Membrane Alteration  Voice Changes/ Stridor/Larynx: no  Pharynx & Esophagus: none    Elimination Alterations  Constipation: no  Diarrhea:  no      Emotional  Coping: effective      Injury, potential bleeding or infection: na    Other:na    Lab Results   Component Value Date    WBC 8.6 12/09/2023    HGB 12.5 12/09/2023    HCT 40.3 12/09/2023     12/09/2023         /86   Pulse 81   Temp 97.6 °F (36.4 °C) (Temporal)   Resp 18   Wt 103.4 kg (228 lb)   LMP 10/02/2019   BMI 39.12 kg/m²   BP within normal range? yes      Assessment/Plan:Completed 6/28 fractions; 1080/5040 cGy    Antonia Osorio RN

## 2024-02-01 ENCOUNTER — HOSPITAL ENCOUNTER (OUTPATIENT)
Dept: RADIATION ONCOLOGY | Age: 59
Discharge: HOME | End: 2024-02-01
Payer: COMMERCIAL

## 2024-02-01 PROCEDURE — 77386 HC NTSTY MODUL RAD TX DLVR CPLX: CPT | Performed by: RADIOLOGY

## 2024-02-02 ENCOUNTER — HOSPITAL ENCOUNTER (OUTPATIENT)
Dept: RADIATION ONCOLOGY | Age: 59
Discharge: HOME | End: 2024-02-02
Payer: COMMERCIAL

## 2024-02-02 LAB
CULTURE: ABNORMAL
SPECIMEN DESCRIPTION: ABNORMAL

## 2024-02-02 PROCEDURE — 77386 HC NTSTY MODUL RAD TX DLVR CPLX: CPT | Performed by: RADIOLOGY

## 2024-02-05 ENCOUNTER — APPOINTMENT (OUTPATIENT)
Dept: RADIATION ONCOLOGY | Age: 59
End: 2024-02-05
Payer: COMMERCIAL

## 2024-02-05 PROCEDURE — 77386 HC NTSTY MODUL RAD TX DLVR CPLX: CPT | Performed by: RADIOLOGY

## 2024-02-05 PROCEDURE — 77014 CHG CT GUIDANCE RADIATION THERAPY FLDS PLACEMENT: CPT | Performed by: RADIOLOGY

## 2024-02-06 ENCOUNTER — APPOINTMENT (OUTPATIENT)
Dept: RADIATION ONCOLOGY | Age: 59
End: 2024-02-06
Payer: COMMERCIAL

## 2024-02-06 PROCEDURE — 77427 RADIATION TX MANAGEMENT X5: CPT | Performed by: RADIOLOGY

## 2024-02-06 PROCEDURE — 77014 CHG CT GUIDANCE RADIATION THERAPY FLDS PLACEMENT: CPT | Performed by: RADIOLOGY

## 2024-02-06 PROCEDURE — 77386 HC NTSTY MODUL RAD TX DLVR CPLX: CPT | Performed by: RADIOLOGY

## 2024-02-06 PROCEDURE — 77336 RADIATION PHYSICS CONSULT: CPT | Performed by: RADIOLOGY

## 2024-02-07 ENCOUNTER — APPOINTMENT (OUTPATIENT)
Dept: RADIATION ONCOLOGY | Age: 59
End: 2024-02-07
Payer: COMMERCIAL

## 2024-02-07 VITALS
BODY MASS INDEX: 39.46 KG/M2 | RESPIRATION RATE: 20 BRPM | HEART RATE: 90 BPM | WEIGHT: 230 LBS | SYSTOLIC BLOOD PRESSURE: 133 MMHG | DIASTOLIC BLOOD PRESSURE: 88 MMHG | TEMPERATURE: 96.9 F

## 2024-02-07 DIAGNOSIS — C15.5 MALIGNANT NEOPLASM OF LOWER THIRD OF ESOPHAGUS (HCC): Primary | ICD-10-CM

## 2024-02-07 DIAGNOSIS — F41.8 ANXIETY ABOUT HEALTH: Primary | ICD-10-CM

## 2024-02-07 PROCEDURE — 77386 HC NTSTY MODUL RAD TX DLVR CPLX: CPT | Performed by: RADIOLOGY

## 2024-02-07 RX ORDER — ALPRAZOLAM 0.5 MG/1
0.5 TABLET ORAL NIGHTLY PRN
Qty: 30 TABLET | Refills: 1 | Status: SHIPPED | OUTPATIENT
Start: 2024-02-07 | End: 2024-04-07

## 2024-02-07 NOTE — PROGRESS NOTES
Anita JONES Jolie  2/7/2024  Wt Readings from Last 3 Encounters:   02/07/24 104.3 kg (230 lb)   01/31/24 103.4 kg (228 lb)   01/30/24 103 kg (227 lb)     Body mass index is 39.46 kg/m².        Treatment Area:Distal esophagus    Patient was seen today for weekly visit.      Comfort Alteration  KPS:80%  Fatigue: Mild    Ventilation Alterations  Cough: No  Hemoptysis: No  Mucus Color: na  Dyspnea: No  O2 Sat: 97%    Nutritional Alteration  Anorexia: No  Nausea: No   Vomiting: No     Skin Alteration   Sensation:none    Radiation Dermatitis:  none, moisturizing    Mucous Membrane Alteration  Voice Changes/ Stridor/Larynx: no  Pharynx & Esophagus: none    Elimination Alterations  Constipation: no  Diarrhea:  no      Emotional  Coping: effective      Injury, potential bleeding or infection: na    Other:na    Lab Results   Component Value Date    WBC 8.6 12/09/2023    HGB 12.5 12/09/2023    HCT 40.3 12/09/2023     12/09/2023         /88   Pulse 90   Temp 96.9 °F (36.1 °C)   Resp 20   Wt 104.3 kg (230 lb)   LMP 10/02/2019   BMI 39.46 kg/m²   BP within normal range? yes       Assessment/Plan:Completed 11/28 fractions; 1980/5040 cGy.  Patient states continue to feel lightheaded/ dizziness when getting off the treatment table, she sits in the waiting area drink some water and it eases up.  She also mentioned that she started having bilateral hand tremors after treatment. Dr Palafox updated.    Antonia Osorio RN      
([facility]) Cancer Center: [phone] (FAX: [phone])  [facility] ([place]: [phone] (FAX: [phone])  [facility] ([place]:  [phone] (FAX:  [phone])

## 2024-02-08 ENCOUNTER — APPOINTMENT (OUTPATIENT)
Dept: RADIATION ONCOLOGY | Age: 59
End: 2024-02-08
Payer: COMMERCIAL

## 2024-02-08 ENCOUNTER — OFFICE VISIT (OUTPATIENT)
Dept: CARDIOLOGY CLINIC | Age: 59
End: 2024-02-08
Payer: COMMERCIAL

## 2024-02-08 VITALS
RESPIRATION RATE: 18 BRPM | BODY MASS INDEX: 39.09 KG/M2 | HEART RATE: 94 BPM | SYSTOLIC BLOOD PRESSURE: 122 MMHG | DIASTOLIC BLOOD PRESSURE: 80 MMHG | HEIGHT: 64 IN | WEIGHT: 229 LBS

## 2024-02-08 DIAGNOSIS — R05.9 COUGH, UNSPECIFIED TYPE: ICD-10-CM

## 2024-02-08 DIAGNOSIS — I10 PRIMARY HYPERTENSION: Primary | ICD-10-CM

## 2024-02-08 DIAGNOSIS — U07.1 COVID-19 VIRUS INFECTION: ICD-10-CM

## 2024-02-08 DIAGNOSIS — C15.9 MALIGNANT NEOPLASM OF ESOPHAGUS, UNSPECIFIED LOCATION (HCC): ICD-10-CM

## 2024-02-08 DIAGNOSIS — N30.01 ACUTE CYSTITIS WITH HEMATURIA: ICD-10-CM

## 2024-02-08 PROCEDURE — 3079F DIAST BP 80-89 MM HG: CPT | Performed by: INTERNAL MEDICINE

## 2024-02-08 PROCEDURE — 99213 OFFICE O/P EST LOW 20 MIN: CPT | Performed by: INTERNAL MEDICINE

## 2024-02-08 PROCEDURE — 3017F COLORECTAL CA SCREEN DOC REV: CPT | Performed by: INTERNAL MEDICINE

## 2024-02-08 PROCEDURE — 77386 HC NTSTY MODUL RAD TX DLVR CPLX: CPT | Performed by: RADIOLOGY

## 2024-02-08 PROCEDURE — G8484 FLU IMMUNIZE NO ADMIN: HCPCS | Performed by: INTERNAL MEDICINE

## 2024-02-08 PROCEDURE — G8417 CALC BMI ABV UP PARAM F/U: HCPCS | Performed by: INTERNAL MEDICINE

## 2024-02-08 PROCEDURE — 93000 ELECTROCARDIOGRAM COMPLETE: CPT | Performed by: INTERNAL MEDICINE

## 2024-02-08 PROCEDURE — 1036F TOBACCO NON-USER: CPT | Performed by: INTERNAL MEDICINE

## 2024-02-08 PROCEDURE — G8427 DOCREV CUR MEDS BY ELIG CLIN: HCPCS | Performed by: INTERNAL MEDICINE

## 2024-02-08 PROCEDURE — 3074F SYST BP LT 130 MM HG: CPT | Performed by: INTERNAL MEDICINE

## 2024-02-08 RX ORDER — LIDOCAINE AND PRILOCAINE 25; 25 MG/G; MG/G
CREAM TOPICAL
COMMUNITY
Start: 2024-02-04

## 2024-02-08 RX ORDER — FLUOCINONIDE TOPICAL SOLUTION USP, 0.05% 0.5 MG/ML
SOLUTION TOPICAL
COMMUNITY
Start: 2024-01-30

## 2024-02-08 RX ORDER — AMMONIUM LACTATE 12 G/100G
LOTION TOPICAL
COMMUNITY
Start: 2024-01-31

## 2024-02-08 NOTE — PROGRESS NOTES
OUTPATIENT CARDIOLOGY FOLLOW-UP    Name: Anita Dave    Age: 58 y.o.    Primary Care Physician: Washington Rivers DO    Date of Service: 2/8/2024    Chief Complaint:   Chief Complaint   Patient presents with    Follow-up     6 month ov    Shortness of Breath       Interim History:   Ms. Dave is a 57-year-old  female with history of morbid obesity, atrial septal aneurysm, palpitations and atypical chest pain presented to the office visit for evaluation of dyspnea with exertion and leg edema.  She was evaluated in the office by Dr. Constantino on 11/5/2021 and noted that her Holter, stress test and echo results were normal and no further cardiac work-up was recommended.  Patient was reassured and was recommended follow-up as needed.  She has been having dyspnea for the past 15 years along with some chest pain.  She told me that she recently lost her  to  colon cancer and passed away in January 2022.    She was seen in the office on 8/8/23, since last visit, she has not had any ER visits or hospitalizations.  She is compliant with medications, as well as salt and fluid intake.  She does not take any over-the-counter arthritis medications. She was diagnosed with esophageal adenocarcinoma stage T1 and N1 in11/23.  Now, she is undergoing XRT and received 4 cycles of chemotherapy.      She had chest pain on 4/24/23, and was associated with dizziness, sweating, patient went to the emergency room and was evaluated and discharged home.  Recently, she was admitted to hospital from 3/6/23 to 3/20/23 with bilateral submassive PE.  She was initiated on anticoagulation therapy and currently taking Eliquis 5 mg p.o. twice daily without bleeding complications.  Denies smoking.  Echo in 3/23 showed an EF 60%, mildly dilated RV, trace pericardial effusion.  She was having episodes of chest pain over the lower sternal area. Pain is constant over the lower sternal area. She gets winded on exertion, minimal

## 2024-02-08 NOTE — PATIENT INSTRUCTIONS
Coronary CTA  results reviewed, as above and discussed the patient,  has non obstructive CAD and mild TAA.  No need to rpt another CTA chest at this time.   Losartan was stopped due low BP after stating on chemo and XRT. Will continue metoprolol  50 mg twice a day for now and consider restarting later.   Follow up with oncology  service as scheduled.  Continue current mediations   Echo results reviewed, as above.  Follow up with me in 6 months.

## 2024-02-09 ENCOUNTER — APPOINTMENT (OUTPATIENT)
Dept: RADIATION ONCOLOGY | Age: 59
End: 2024-02-09
Payer: COMMERCIAL

## 2024-02-09 PROCEDURE — 77386 HC NTSTY MODUL RAD TX DLVR CPLX: CPT | Performed by: RADIOLOGY

## 2024-02-09 RX ORDER — DEXAMETHASONE 6 MG/1
6 TABLET ORAL 2 TIMES DAILY WITH MEALS
Qty: 14 TABLET | Refills: 0 | OUTPATIENT
Start: 2024-02-09 | End: 2024-02-16

## 2024-02-09 RX ORDER — BENZONATATE 100 MG/1
CAPSULE ORAL
Qty: 30 CAPSULE | Refills: 0 | OUTPATIENT
Start: 2024-02-09

## 2024-02-09 RX ORDER — CEFDINIR 300 MG/1
CAPSULE ORAL
Qty: 14 CAPSULE | Refills: 0 | OUTPATIENT
Start: 2024-02-09

## 2024-02-12 ENCOUNTER — APPOINTMENT (OUTPATIENT)
Dept: RADIATION ONCOLOGY | Age: 59
End: 2024-02-12
Payer: COMMERCIAL

## 2024-02-12 PROCEDURE — 77014 CHG CT GUIDANCE RADIATION THERAPY FLDS PLACEMENT: CPT | Performed by: RADIOLOGY

## 2024-02-12 PROCEDURE — 77386 HC NTSTY MODUL RAD TX DLVR CPLX: CPT | Performed by: RADIOLOGY

## 2024-02-13 ENCOUNTER — OFFICE VISIT (OUTPATIENT)
Dept: FAMILY MEDICINE CLINIC | Age: 59
End: 2024-02-13
Payer: COMMERCIAL

## 2024-02-13 ENCOUNTER — APPOINTMENT (OUTPATIENT)
Dept: RADIATION ONCOLOGY | Age: 59
End: 2024-02-13
Payer: COMMERCIAL

## 2024-02-13 VITALS
BODY MASS INDEX: 38.76 KG/M2 | DIASTOLIC BLOOD PRESSURE: 88 MMHG | HEART RATE: 90 BPM | SYSTOLIC BLOOD PRESSURE: 124 MMHG | WEIGHT: 227 LBS | HEIGHT: 64 IN | OXYGEN SATURATION: 96 % | TEMPERATURE: 98.6 F

## 2024-02-13 DIAGNOSIS — R35.0 URINARY FREQUENCY: ICD-10-CM

## 2024-02-13 DIAGNOSIS — E11.9 TYPE 2 DIABETES MELLITUS WITHOUT COMPLICATION, WITHOUT LONG-TERM CURRENT USE OF INSULIN (HCC): ICD-10-CM

## 2024-02-13 DIAGNOSIS — N30.01 ACUTE CYSTITIS WITH HEMATURIA: ICD-10-CM

## 2024-02-13 DIAGNOSIS — N30.01 ACUTE CYSTITIS WITH HEMATURIA: Primary | ICD-10-CM

## 2024-02-13 LAB
BILIRUBIN, POC: NEGATIVE
BLOOD URINE, POC: NORMAL
CLARITY, POC: NORMAL
COLOR, POC: NORMAL
GLUCOSE URINE, POC: NEGATIVE
HBA1C MFR BLD: 7.4 % (ref 4–5.6)
KETONES, POC: NEGATIVE
LEUKOCYTE EST, POC: NORMAL
NITRITE, POC: POSITIVE
PH, POC: 6
PROTEIN, POC: 30
SPECIFIC GRAVITY, POC: 1.03
UROBILINOGEN, POC: 0.2

## 2024-02-13 PROCEDURE — 77014 CHG CT GUIDANCE RADIATION THERAPY FLDS PLACEMENT: CPT | Performed by: RADIOLOGY

## 2024-02-13 PROCEDURE — G8427 DOCREV CUR MEDS BY ELIG CLIN: HCPCS

## 2024-02-13 PROCEDURE — 3017F COLORECTAL CA SCREEN DOC REV: CPT

## 2024-02-13 PROCEDURE — 99213 OFFICE O/P EST LOW 20 MIN: CPT

## 2024-02-13 PROCEDURE — G8417 CALC BMI ABV UP PARAM F/U: HCPCS

## 2024-02-13 PROCEDURE — 3079F DIAST BP 80-89 MM HG: CPT

## 2024-02-13 PROCEDURE — 77386 HC NTSTY MODUL RAD TX DLVR CPLX: CPT | Performed by: RADIOLOGY

## 2024-02-13 PROCEDURE — 1036F TOBACCO NON-USER: CPT

## 2024-02-13 PROCEDURE — 77427 RADIATION TX MANAGEMENT X5: CPT | Performed by: RADIOLOGY

## 2024-02-13 PROCEDURE — 81002 URINALYSIS NONAUTO W/O SCOPE: CPT

## 2024-02-13 PROCEDURE — G8484 FLU IMMUNIZE NO ADMIN: HCPCS

## 2024-02-13 PROCEDURE — 77336 RADIATION PHYSICS CONSULT: CPT | Performed by: RADIOLOGY

## 2024-02-13 PROCEDURE — 3074F SYST BP LT 130 MM HG: CPT

## 2024-02-13 RX ORDER — NITROFURANTOIN 25; 75 MG/1; MG/1
100 CAPSULE ORAL 2 TIMES DAILY
Qty: 14 CAPSULE | Refills: 0 | Status: SHIPPED | OUTPATIENT
Start: 2024-02-13 | End: 2024-02-20

## 2024-02-13 NOTE — PROGRESS NOTES
management.  Plan is for symptom management and PCP follow up.     Assessment / Plan   Impression(s):  Anita was seen today for urinary tract infection.    Diagnoses and all orders for this visit:    Acute cystitis with hematuria  -     nitrofurantoin, macrocrystal-monohydrate, (MACROBID) 100 MG capsule; Take 1 capsule by mouth 2 times daily for 7 days    Other orders  -     POCT Urinalysis no Micro  -     Culture, Urine; Future    UA appears positive for a UTI. Urine C&S pending, will call with results once available. Script written for Macrobid, side effects discussed. Increase fluids and rest. F/u PCP if symptoms persist. ED sooner if symptoms worsen or change. ED immediately with the development of fever, shaking chills, body aches, flank pain, vomiting, CP, or SOB. Pt is in agreement with this care plan. All questions answered.     Electronically signed by JORGE Saeed CNP   DD: 2/13/24    **This report was transcribed using voice recognition software. Every effort was made to ensure accuracy; however, inadvertent computerized transcription errors may be present.

## 2024-02-14 ENCOUNTER — APPOINTMENT (OUTPATIENT)
Dept: RADIATION ONCOLOGY | Age: 59
End: 2024-02-14
Payer: COMMERCIAL

## 2024-02-14 ENCOUNTER — OFFICE VISIT (OUTPATIENT)
Dept: PRIMARY CARE CLINIC | Age: 59
End: 2024-02-14
Payer: COMMERCIAL

## 2024-02-14 VITALS
SYSTOLIC BLOOD PRESSURE: 126 MMHG | HEART RATE: 94 BPM | DIASTOLIC BLOOD PRESSURE: 80 MMHG | TEMPERATURE: 98.6 F | OXYGEN SATURATION: 99 % | RESPIRATION RATE: 18 BRPM | WEIGHT: 227 LBS | HEIGHT: 64 IN | BODY MASS INDEX: 38.76 KG/M2

## 2024-02-14 VITALS
TEMPERATURE: 97.3 F | DIASTOLIC BLOOD PRESSURE: 84 MMHG | SYSTOLIC BLOOD PRESSURE: 126 MMHG | RESPIRATION RATE: 18 BRPM | OXYGEN SATURATION: 99 %

## 2024-02-14 DIAGNOSIS — R25.1 TREMOR: ICD-10-CM

## 2024-02-14 DIAGNOSIS — E11.9 TYPE 2 DIABETES MELLITUS WITHOUT COMPLICATION, WITHOUT LONG-TERM CURRENT USE OF INSULIN (HCC): ICD-10-CM

## 2024-02-14 DIAGNOSIS — C15.9 MALIGNANT NEOPLASM OF ESOPHAGUS, UNSPECIFIED LOCATION (HCC): ICD-10-CM

## 2024-02-14 DIAGNOSIS — C15.5 MALIGNANT NEOPLASM OF LOWER THIRD OF ESOPHAGUS (HCC): Primary | ICD-10-CM

## 2024-02-14 DIAGNOSIS — F41.9 ANXIETY: Primary | ICD-10-CM

## 2024-02-14 PROBLEM — J96.01 ACUTE RESPIRATORY FAILURE WITH HYPOXIA (HCC): Status: RESOLVED | Noted: 2023-03-16 | Resolved: 2024-02-14

## 2024-02-14 PROBLEM — I26.09 COR PULMONALE, ACUTE (HCC): Status: RESOLVED | Noted: 2023-03-17 | Resolved: 2024-02-14

## 2024-02-14 PROCEDURE — G8417 CALC BMI ABV UP PARAM F/U: HCPCS | Performed by: FAMILY MEDICINE

## 2024-02-14 PROCEDURE — 99214 OFFICE O/P EST MOD 30 MIN: CPT | Performed by: FAMILY MEDICINE

## 2024-02-14 PROCEDURE — 1036F TOBACCO NON-USER: CPT | Performed by: FAMILY MEDICINE

## 2024-02-14 PROCEDURE — 3017F COLORECTAL CA SCREEN DOC REV: CPT | Performed by: FAMILY MEDICINE

## 2024-02-14 PROCEDURE — G8484 FLU IMMUNIZE NO ADMIN: HCPCS | Performed by: FAMILY MEDICINE

## 2024-02-14 PROCEDURE — 2022F DILAT RTA XM EVC RTNOPTHY: CPT | Performed by: FAMILY MEDICINE

## 2024-02-14 PROCEDURE — 3051F HG A1C>EQUAL 7.0%<8.0%: CPT | Performed by: FAMILY MEDICINE

## 2024-02-14 PROCEDURE — 77386 HC NTSTY MODUL RAD TX DLVR CPLX: CPT | Performed by: RADIOLOGY

## 2024-02-14 PROCEDURE — G8427 DOCREV CUR MEDS BY ELIG CLIN: HCPCS | Performed by: FAMILY MEDICINE

## 2024-02-14 PROCEDURE — 3079F DIAST BP 80-89 MM HG: CPT | Performed by: FAMILY MEDICINE

## 2024-02-14 PROCEDURE — 3074F SYST BP LT 130 MM HG: CPT | Performed by: FAMILY MEDICINE

## 2024-02-14 RX ORDER — CLOBETASOL PROPIONATE 0.46 MG/ML
SOLUTION TOPICAL
COMMUNITY
Start: 2024-02-08

## 2024-02-14 RX ORDER — BUSPIRONE HYDROCHLORIDE 10 MG/1
10 TABLET ORAL 3 TIMES DAILY
Qty: 90 TABLET | Refills: 0 | Status: SHIPPED | OUTPATIENT
Start: 2024-02-14 | End: 2024-03-15

## 2024-02-14 NOTE — PROGRESS NOTES
DEPARTMENT OF RADIATION ONCOLOGY   ON TREATMENT VISIT       2/14/2024      NAME:  Anita Dave    YOB: 1965    DIAGNOSIS: Clinical stage IIa (cT1b, N1, M0, G3) adenocarcinoma of the distal esophagus in the patient with a recent history of thrombocytophilia.    SUBJECTIVE:   Anita Dave has now received 2880 cGy in 16 fractions directed to the distal esophagus.      Past medical, surgical, social and family histories reviewed and updated as indicated.    PAIN: No    ALLERGIES:  Quinolones         Current Outpatient Medications   Medication Sig Dispense Refill    nitrofurantoin, macrocrystal-monohydrate, (MACROBID) 100 MG capsule Take 1 capsule by mouth 2 times daily for 7 days 14 capsule 0    ammonium lactate (LAC-HYDRIN) 12 % lotion APPLY AS DIRECTED TWICE DAILY      fluocinonide (LIDEX) 0.05 % external solution APPLY TO AFFECTED AREA EVERY DAY      lidocaine-prilocaine (EMLA) 2.5-2.5 % cream APPLY TO MEDIPORT AS NEEDED      ALPRAZolam (XANAX) 0.5 MG tablet Take 1 tablet by mouth nightly as needed for Sleep for up to 60 days. Max Daily Amount: 0.5 mg 30 tablet 1    mupirocin (BACTROBAN) 2 % ointment APPLY TO AFFECTED AREA 3 TIMES A DAY 22 g 1    meloxicam (MOBIC) 15 MG tablet TAKE 1 TABLET BY MOUTH EVERY DAY (Patient taking differently: as needed TAKE 1 TABLET BY MOUTH EVERY DAY) 30 tablet 11    sucralfate (CARAFATE) 1 GM tablet Take 1 tablet by mouth in the morning, at noon, and at bedtime Crush and dissolve in medicine cup full of water and drink 120 tablet 3    ELIQUIS 5 MG TABS tablet TAKE 1 TABLET BY MOUTH TWICE A DAY 60 tablet 5    fluticasone (FLONASE) 50 MCG/ACT nasal spray SPRAY 1 SPRAY INTO EACH NOSTRIL EVERY DAY 3 each 1    Cholecalciferol (VITAMIN D3) 1.25 MG (35739 UT) CAPS TAKE 1 CAPSULE BY MOUTH ONE TIME PER WEEK 12 capsule 1    metoprolol tartrate (LOPRESSOR) 50 MG tablet TAKE 1 TABLET BY MOUTH TWICE A  tablet 3    atorvastatin (LIPITOR) 40 MG tablet TAKE 1

## 2024-02-14 NOTE — PROGRESS NOTES
Anita Dave  2/14/2024  Wt Readings from Last 3 Encounters:   02/13/24 103 kg (227 lb)   02/08/24 103.9 kg (229 lb)   02/07/24 104.3 kg (230 lb)     There is no height or weight on file to calculate BMI.        Treatment Area:Distal Esophagus    Patient was seen today for weekly visit.     Comfort Alteration  KPS:80%  Fatigue: Mild      Nutritional Alteration  Anorexia: No   Nausea: No   Vomiting: No       Elimination Alterations  Constipation: no  Diarrhea:  no    Skin Alteration   Sensation:none    Radiation Dermatitis:  no    Emotional  Coping: effective    Sexuality Alteration  na    Injury, potential bleeding or infection: no      Lab Results   Component Value Date    WBC 8.6 12/09/2023    HGB 12.5 12/09/2023    HCT 40.3 12/09/2023     12/09/2023       Temp 97.3 °F (36.3 °C) (Temporal)   Resp 18   LMP 10/02/2019   BP within normal range? yes       Assessment/Plan:16/28fx;2880/5040cGy completed. Patient very shaky today. Stated she has an appointment with her PCP today at 11:30, Dr. Rivers. Patient also stated she currently has a UTI and some nausea.       Vesta Pavon RN

## 2024-02-14 NOTE — PROGRESS NOTES
Chief Complaint:   Chief Complaint   Patient presents with    Tremors     Started few weeks ago.     Anxiety    Depression       Tremor    The tremor affects the hands. The onset of the tremor was sudden. Current severity of tremor is rated at mild.   Mental Health Problem  The primary symptoms include dysphoric mood. This is a recurrent problem.   The onset of the illness is precipitated by a stressful event. The degree of incapacity that she is experiencing as a consequence of her illness is moderate. Additional symptoms of the illness do not include agitation, headaches or abdominal pain. She does not admit to suicidal ideas. She does not have a plan to attempt suicide. She does not contemplate harming herself. She has not already injured self. She does not contemplate injuring another person. She has not already  injured another person.       Patient Active Problem List   Diagnosis    Palpitations    Atypical chest pain    Atrial septal aneurysm    Type 2 diabetes mellitus without complication, without long-term current use of insulin (HCC)    Hyperlipidemia    Essential hypertension    Pulmonary embolism, bilateral (HCC)    Acute electrocardiogram changes    Pulmonary infarct (HCC)    Lactic acidosis    Pulmonary embolus (HCC)    Right heart enlargement    S/P admn tPA in diff fac w/n last 24 hr bef adm to crnt fac    Anemia    Asymptomatic bacteriuria    Primary hypertension    Aneurysm of ascending aorta without rupture (HCC)    Abdominal pain, epigastric    Malignant neoplasm of esophagus (HCC)    Anxiety    Cancer of esophagus (HCC)       Past Medical History:   Diagnosis Date    Aorta disorder (HCC)     ascending aortic dilation 4.3 cm echo 10/6/23 per Vascular note [facility] 10/20/23 in Epic    Atrial septal aneurysm     Cancer (HCC)     Cancer of esophagus (HCC) 11/13/2023    Gall bladder disease     Herniated disc     Hyperlipidemia     Hypertension     Infectious lymphangitis of deep lymphatics     Kidney

## 2024-02-15 ENCOUNTER — APPOINTMENT (OUTPATIENT)
Dept: RADIATION ONCOLOGY | Age: 59
End: 2024-02-15
Payer: COMMERCIAL

## 2024-02-15 PROCEDURE — 77386 HC NTSTY MODUL RAD TX DLVR CPLX: CPT | Performed by: RADIOLOGY

## 2024-02-16 ENCOUNTER — APPOINTMENT (OUTPATIENT)
Dept: RADIATION ONCOLOGY | Age: 59
End: 2024-02-16
Payer: COMMERCIAL

## 2024-02-16 LAB
CULTURE: ABNORMAL
SPECIMEN DESCRIPTION: ABNORMAL

## 2024-02-16 PROCEDURE — 77386 HC NTSTY MODUL RAD TX DLVR CPLX: CPT | Performed by: RADIOLOGY

## 2024-02-19 ENCOUNTER — APPOINTMENT (OUTPATIENT)
Dept: RADIATION ONCOLOGY | Age: 59
End: 2024-02-19
Payer: COMMERCIAL

## 2024-02-19 DIAGNOSIS — S81.802D WOUND OF LEFT LOWER EXTREMITY, SUBSEQUENT ENCOUNTER: ICD-10-CM

## 2024-02-20 ENCOUNTER — APPOINTMENT (OUTPATIENT)
Dept: RADIATION ONCOLOGY | Age: 59
End: 2024-02-20
Payer: COMMERCIAL

## 2024-02-20 PROCEDURE — 77336 RADIATION PHYSICS CONSULT: CPT | Performed by: RADIOLOGY

## 2024-02-20 PROCEDURE — 77014 CHG CT GUIDANCE RADIATION THERAPY FLDS PLACEMENT: CPT | Performed by: RADIOLOGY

## 2024-02-20 PROCEDURE — 77427 RADIATION TX MANAGEMENT X5: CPT | Performed by: RADIOLOGY

## 2024-02-20 PROCEDURE — 77386 HC NTSTY MODUL RAD TX DLVR CPLX: CPT | Performed by: RADIOLOGY

## 2024-02-21 ENCOUNTER — APPOINTMENT (OUTPATIENT)
Dept: RADIATION ONCOLOGY | Age: 59
End: 2024-02-21
Payer: COMMERCIAL

## 2024-02-21 ENCOUNTER — HOSPITAL ENCOUNTER (OUTPATIENT)
Dept: RADIATION ONCOLOGY | Age: 59
Discharge: HOME | End: 2024-02-21
Payer: COMMERCIAL

## 2024-02-21 VITALS
SYSTOLIC BLOOD PRESSURE: 126 MMHG | BODY MASS INDEX: 38.94 KG/M2 | RESPIRATION RATE: 18 BRPM | OXYGEN SATURATION: 93 % | DIASTOLIC BLOOD PRESSURE: 89 MMHG | WEIGHT: 227 LBS | HEART RATE: 74 BPM | TEMPERATURE: 97.4 F

## 2024-02-21 DIAGNOSIS — C15.5 MALIGNANT NEOPLASM OF LOWER THIRD OF ESOPHAGUS (HCC): Primary | ICD-10-CM

## 2024-02-21 DIAGNOSIS — T66.XXXA ADVERSE EFFECT OF RADIATION, INITIAL ENCOUNTER: Primary | ICD-10-CM

## 2024-02-21 PROCEDURE — 77386 HC NTSTY MODUL RAD TX DLVR CPLX: CPT | Performed by: RADIOLOGY

## 2024-02-21 PROCEDURE — 77014 CHG CT GUIDANCE RADIATION THERAPY FLDS PLACEMENT: CPT | Performed by: RADIOLOGY

## 2024-02-21 RX ORDER — ONDANSETRON HYDROCHLORIDE 8 MG/1
8 TABLET, FILM COATED ORAL EVERY 8 HOURS PRN
COMMUNITY

## 2024-02-21 NOTE — PROGRESS NOTES
Anita Dave  2/21/2024  Wt Readings from Last 3 Encounters:   02/21/24 103 kg (227 lb)   02/14/24 103 kg (227 lb)   02/13/24 103 kg (227 lb)     Body mass index is 38.94 kg/m².        Treatment Area:Distal Esophagus    Patient was seen today for weekly visit.     Comfort Alteration  KPS:80%  Fatigue: Moderate      Nutritional Alteration  Anorexia: No   Nausea: No   Vomiting: No       Elimination Alterations  Constipation: no  Diarrhea:  no    Skin Alteration   Sensation:Good    Radiation Dermatitis:  no    Emotional  Coping: effective    Sexuality Alteration  na    Injury, potential bleeding or infection: no      Lab Results   Component Value Date    WBC 8.6 12/09/2023    HGB 12.5 12/09/2023    HCT 40.3 12/09/2023     12/09/2023       /89   Pulse 74   Temp 97.4 °F (36.3 °C) (Temporal)   Resp 18   Wt 103 kg (227 lb)   LMP 10/02/2019   SpO2 93%   BMI 38.94 kg/m²   BP within normal range? yes     Assessment/Plan:21/28fx;3780/5040cGy completed.      Vesta Pavon RN

## 2024-02-21 NOTE — PROGRESS NOTES
DEPARTMENT OF RADIATION ONCOLOGY   ON TREATMENT VISIT       2/21/2024      NAME:  Anita Dave    YOB: 1965    DIAGNOSIS: Clinical stage IIa (cT1b, N1, M0, G3) adenocarcinoma of the distal esophagus in the patient with a recent history of thrombocytophilia.    SUBJECTIVE:   Anita Dave has now received 3780 cGy in 21 fractions directed to the distal esophagus.      Past medical, surgical, social and family histories reviewed and updated as indicated.    PAIN: Initial dysphagia    ALLERGIES:  Quinolones         Current Outpatient Medications   Medication Sig Dispense Refill    mupirocin (BACTROBAN) 2 % ointment APPLY TO AFFECTED AREA 3 TIMES A DAY 22 g 1    clobetasol (TEMOVATE) 0.05 % external solution APPLY TO SCALP EVERY DAY      busPIRone (BUSPAR) 10 MG tablet Take 1 tablet by mouth 3 times daily 90 tablet 0    ammonium lactate (LAC-HYDRIN) 12 % lotion APPLY AS DIRECTED TWICE DAILY      fluocinonide (LIDEX) 0.05 % external solution APPLY TO AFFECTED AREA EVERY DAY      lidocaine-prilocaine (EMLA) 2.5-2.5 % cream APPLY TO MEDIPORT AS NEEDED      ALPRAZolam (XANAX) 0.5 MG tablet Take 1 tablet by mouth nightly as needed for Sleep for up to 60 days. Max Daily Amount: 0.5 mg 30 tablet 1    meloxicam (MOBIC) 15 MG tablet TAKE 1 TABLET BY MOUTH EVERY DAY (Patient taking differently: as needed TAKE 1 TABLET BY MOUTH EVERY DAY) 30 tablet 11    sucralfate (CARAFATE) 1 GM tablet Take 1 tablet by mouth in the morning, at noon, and at bedtime Crush and dissolve in medicine cup full of water and drink 120 tablet 3    ELIQUIS 5 MG TABS tablet TAKE 1 TABLET BY MOUTH TWICE A DAY 60 tablet 5    fluticasone (FLONASE) 50 MCG/ACT nasal spray SPRAY 1 SPRAY INTO EACH NOSTRIL EVERY DAY 3 each 1    Cholecalciferol (VITAMIN D3) 1.25 MG (08824 UT) CAPS TAKE 1 CAPSULE BY MOUTH ONE TIME PER WEEK 12 capsule 1    metoprolol tartrate (LOPRESSOR) 50 MG tablet TAKE 1 TABLET BY MOUTH TWICE A  tablet 3

## 2024-02-21 NOTE — ADDENDUM NOTE
Encounter addended by: Vesta Pavon RN on: 2/21/2024 10:45 AM   Actions taken: Order Reconciliation Section accessed, Medication List reviewed, Home Medications modified [FreeTextEntry1] : Rash - RPA [de-identified] : # Rash\par Onset: About 1 year ago\par Location: Vulva\par Symptoms: Itch that comes and goes\par Previous treatments and response: Used nystatin-TAC which helps\par Had biopsy performed by GYN - normal skin with melanophages\par Also has hx of lower back issues\par Washes with [employer] [employer]\par Wears black cotton underwear\par Used protopic ointment since last visit - some improvement\par Requesting biopsy\par \par Rheum: Dr. Albrecht \par Hx of Sjogrens on HCQ\par \par # Redness/scale that comes and goes on forehead\par Intermittent, active today\par Has not put anything on the area\par

## 2024-02-22 ENCOUNTER — APPOINTMENT (OUTPATIENT)
Dept: RADIATION ONCOLOGY | Age: 59
End: 2024-02-22
Payer: COMMERCIAL

## 2024-02-22 ENCOUNTER — TELEPHONE (OUTPATIENT)
Dept: ONCOLOGY | Age: 59
End: 2024-02-22

## 2024-02-22 PROCEDURE — 77014 CHG CT GUIDANCE RADIATION THERAPY FLDS PLACEMENT: CPT | Performed by: RADIOLOGY

## 2024-02-22 PROCEDURE — 77386 HC NTSTY MODUL RAD TX DLVR CPLX: CPT | Performed by: RADIOLOGY

## 2024-02-22 NOTE — TELEPHONE ENCOUNTER
Met with pt in conjunction with radiation oncology tx at request of cancer center staff as introduction to oncology social work.  Pt is 58-year-old female being managed for esophageal cancer.  Pt's mood appeared euthymic with full affect, she appeared A&Ox4, and she was wiling/able to participate in session.  She appeared appropriately dressed/groomed and was able to ambulate/transfer slowly without assistance.  Discussed pt's cancer diagnosis, history of trauma, and ongoing psychosocial stressors.  She noted that in addition to her cancer diagnosis she has recent PE and has aortic aneurism.  She discussed learning of her diagnosis via [employer] and difficult coping with this.  She reported that in 2010 her mother had a massive stroke and discussed being her caregiver until she passed away in 2017.  She explored losing her  to cancer soon after as well as another family member passing away following [place] with esophageal cancer.  Provided support and encouraged pt to try to view her cancer journey as unique from those she has witnessed.  Pt additionally indicated that she is currently estranged from her daughter which causes her additional stress.  She stated that she does have a good friend that accompanies her to chemotherapy and noted a fair social support system.  Pt indicated that she is engaged with counseling services at [facility] and stated that she has an appointment scheduled for 2/23/2024.  She noted that she does recognize that she has some issues with hoarding but noted that she is actively engaging in treatment at this time.  Pt additionally discussed financial concerns.  She stated that she is on SSDI but indicated that most funds go to her mortgage.  Pt to be provided with ACS card to assist with offsetting cost of fuel for radiation tx and will complete [employer] application.  No additional needs identified at this time.  Will remain available.    DARRIN Marinelli,

## 2024-02-23 ENCOUNTER — APPOINTMENT (OUTPATIENT)
Dept: RADIATION ONCOLOGY | Age: 59
End: 2024-02-23
Payer: COMMERCIAL

## 2024-02-23 PROCEDURE — 77386 HC NTSTY MODUL RAD TX DLVR CPLX: CPT | Performed by: RADIOLOGY

## 2024-02-26 ENCOUNTER — APPOINTMENT (OUTPATIENT)
Dept: RADIATION ONCOLOGY | Age: 59
End: 2024-02-26
Payer: COMMERCIAL

## 2024-02-27 ENCOUNTER — APPOINTMENT (OUTPATIENT)
Dept: RADIATION ONCOLOGY | Age: 59
End: 2024-02-27
Payer: COMMERCIAL

## 2024-02-27 ENCOUNTER — OFFICE VISIT (OUTPATIENT)
Dept: FAMILY MEDICINE CLINIC | Age: 59
End: 2024-02-27
Payer: COMMERCIAL

## 2024-02-27 VITALS
SYSTOLIC BLOOD PRESSURE: 130 MMHG | HEART RATE: 102 BPM | BODY MASS INDEX: 37.9 KG/M2 | OXYGEN SATURATION: 97 % | WEIGHT: 222 LBS | TEMPERATURE: 97.7 F | DIASTOLIC BLOOD PRESSURE: 82 MMHG | HEIGHT: 64 IN

## 2024-02-27 DIAGNOSIS — N30.01 ACUTE CYSTITIS WITH HEMATURIA: Primary | ICD-10-CM

## 2024-02-27 DIAGNOSIS — R35.0 URINARY FREQUENCY: ICD-10-CM

## 2024-02-27 LAB
BILIRUBIN, POC: NEGATIVE
BLOOD URINE, POC: NORMAL
CLARITY, POC: NORMAL
COLOR, POC: NORMAL
GLUCOSE URINE, POC: NEGATIVE
KETONES, POC: NEGATIVE
LEUKOCYTE EST, POC: NORMAL
NITRITE, POC: POSITIVE
PH, POC: 5.5
PROTEIN, POC: 100
SPECIFIC GRAVITY, POC: 1.03
UROBILINOGEN, POC: 0.2

## 2024-02-27 PROCEDURE — 3017F COLORECTAL CA SCREEN DOC REV: CPT | Performed by: PHYSICIAN ASSISTANT

## 2024-02-27 PROCEDURE — G8484 FLU IMMUNIZE NO ADMIN: HCPCS | Performed by: PHYSICIAN ASSISTANT

## 2024-02-27 PROCEDURE — G8417 CALC BMI ABV UP PARAM F/U: HCPCS | Performed by: PHYSICIAN ASSISTANT

## 2024-02-27 PROCEDURE — 3079F DIAST BP 80-89 MM HG: CPT | Performed by: PHYSICIAN ASSISTANT

## 2024-02-27 PROCEDURE — 77427 RADIATION TX MANAGEMENT X5: CPT | Performed by: RADIOLOGY

## 2024-02-27 PROCEDURE — 77014 CHG CT GUIDANCE RADIATION THERAPY FLDS PLACEMENT: CPT | Performed by: RADIOLOGY

## 2024-02-27 PROCEDURE — 99214 OFFICE O/P EST MOD 30 MIN: CPT | Performed by: PHYSICIAN ASSISTANT

## 2024-02-27 PROCEDURE — G8427 DOCREV CUR MEDS BY ELIG CLIN: HCPCS | Performed by: PHYSICIAN ASSISTANT

## 2024-02-27 PROCEDURE — 77386 HC NTSTY MODUL RAD TX DLVR CPLX: CPT | Performed by: RADIOLOGY

## 2024-02-27 PROCEDURE — 1036F TOBACCO NON-USER: CPT | Performed by: PHYSICIAN ASSISTANT

## 2024-02-27 PROCEDURE — 81002 URINALYSIS NONAUTO W/O SCOPE: CPT | Performed by: PHYSICIAN ASSISTANT

## 2024-02-27 PROCEDURE — 77336 RADIATION PHYSICS CONSULT: CPT | Performed by: RADIOLOGY

## 2024-02-27 PROCEDURE — 3075F SYST BP GE 130 - 139MM HG: CPT | Performed by: PHYSICIAN ASSISTANT

## 2024-02-27 RX ORDER — CEFDINIR 300 MG/1
300 CAPSULE ORAL 2 TIMES DAILY
Qty: 20 CAPSULE | Refills: 0 | Status: SHIPPED | OUTPATIENT
Start: 2024-02-27 | End: 2024-03-08

## 2024-02-27 RX ORDER — DEXAMETHASONE 4 MG/1
TABLET ORAL
COMMUNITY
Start: 2024-02-13

## 2024-02-28 ENCOUNTER — HOSPITAL ENCOUNTER (OUTPATIENT)
Dept: RADIATION ONCOLOGY | Age: 59
Discharge: HOME | End: 2024-02-28
Payer: COMMERCIAL

## 2024-02-28 ENCOUNTER — APPOINTMENT (OUTPATIENT)
Dept: RADIATION ONCOLOGY | Age: 59
End: 2024-02-28
Payer: COMMERCIAL

## 2024-02-28 VITALS
RESPIRATION RATE: 18 BRPM | TEMPERATURE: 95.4 F | HEART RATE: 116 BPM | DIASTOLIC BLOOD PRESSURE: 105 MMHG | SYSTOLIC BLOOD PRESSURE: 166 MMHG | WEIGHT: 224.4 LBS | OXYGEN SATURATION: 94 % | BODY MASS INDEX: 38.5 KG/M2

## 2024-02-28 DIAGNOSIS — C15.5 MALIGNANT NEOPLASM OF LOWER THIRD OF ESOPHAGUS (HCC): Primary | ICD-10-CM

## 2024-02-28 PROCEDURE — 77386 HC NTSTY MODUL RAD TX DLVR CPLX: CPT | Performed by: RADIOLOGY

## 2024-02-28 PROCEDURE — 77014 CHG CT GUIDANCE RADIATION THERAPY FLDS PLACEMENT: CPT | Performed by: RADIOLOGY

## 2024-02-28 NOTE — PROGRESS NOTES
Anita Dave  2024  Wt Readings from Last 3 Encounters:   24 101.8 kg (224 lb 6.4 oz)   24 100.7 kg (222 lb)   24 103 kg (227 lb)     Body mass index is 38.5 kg/m².        Treatment Area:Distal Esophagus    Patient was seen today for weekly visit.     Comfort Alteration  KPS:70%  Fatigue: Moderate      Nutritional Alteration  Anorexia: No   Nausea: No   Vomiting: No       Elimination Alterations  Constipation: no  Diarrhea:  no    Skin Alteration   Sensation:Good    Radiation Dermatitis:  no    Emotional  Coping: effective    Sexuality Alteration  na    Injury, potential bleeding or infection: no      Lab Results   Component Value Date    WBC 8.6 2023    HGB 12.5 2023    HCT 40.3 2023     2023       BP (!) 166/105   Pulse (!) 116   Temp (!) 95.4 °F (35.2 °C) (Temporal)   Resp 18   Wt 101.8 kg (224 lb 6.4 oz)   LMP 10/02/2019   SpO2 94%   BMI 38.50 kg/m²   BP within normal range? no   -if no, manually recheck in 5-10 min  NEW BP readin/101  BP within normal range? no   -if no, notify attending provider for further instruction. Patient denies any headaches, chest pains, nosebleeds, or any hypertensive symptoms at this time. Instructed patient if she begins to feel symptomatic to call their PCP, go to an Urgent Care facility or ED.        Assessment/Plan:fx;466/5040cGy completed      Vesta Pavon RN

## 2024-02-28 NOTE — PROGRESS NOTES
DEPARTMENT OF RADIATION ONCOLOGY   ON TREATMENT VISIT       2/28/2024      NAME:  Anita Dave    YOB: 1965    DIAGNOSIS: Clinical stage IIa (cT1b, N1, M0, G3) adenocarcinoma of the distal esophagus in the patient with a recent history of thrombocytophilia.    SUBJECTIVE:   Anita Dave has now received 4668 cGy in 26 fractions directed to the distal esophagus.      Past medical, surgical, social and family histories reviewed and updated as indicated.    PAIN: No    ALLERGIES:  Quinolones         Current Outpatient Medications   Medication Sig Dispense Refill    dexAMETHasone (DECADRON) 4 MG tablet TAKE THREE TABLETS THE NIGHT BEFORE TAXOL TREAMENT      cefdinir (OMNICEF) 300 MG capsule Take 1 capsule by mouth 2 times daily for 10 days 20 capsule 0    ondansetron (ZOFRAN) 8 MG tablet Take 1 tablet by mouth every 8 hours as needed for Nausea or Vomiting Monday's once nightly, Tues. & Wednesday's after chemo TID every 8 hours, then every 8 hours PRN      Magic Mouthwash (MIRACLE MOUTHWASH) Take 10 mLs by mouth 4 times daily as needed for Irritation 400 mL 1    mupirocin (BACTROBAN) 2 % ointment APPLY TO AFFECTED AREA 3 TIMES A DAY 22 g 1    clobetasol (TEMOVATE) 0.05 % external solution APPLY TO SCALP EVERY DAY      busPIRone (BUSPAR) 10 MG tablet Take 1 tablet by mouth 3 times daily 90 tablet 0    ammonium lactate (LAC-HYDRIN) 12 % lotion APPLY AS DIRECTED TWICE DAILY      fluocinonide (LIDEX) 0.05 % external solution APPLY TO AFFECTED AREA EVERY DAY      lidocaine-prilocaine (EMLA) 2.5-2.5 % cream APPLY TO MEDIPORT AS NEEDED      ALPRAZolam (XANAX) 0.5 MG tablet Take 1 tablet by mouth nightly as needed for Sleep for up to 60 days. Max Daily Amount: 0.5 mg 30 tablet 1    meloxicam (MOBIC) 15 MG tablet TAKE 1 TABLET BY MOUTH EVERY DAY (Patient taking differently: as needed TAKE 1 TABLET BY MOUTH EVERY DAY) 30 tablet 11    sucralfate (CARAFATE) 1 GM tablet Take 1 tablet by mouth in the

## 2024-02-29 ENCOUNTER — APPOINTMENT (OUTPATIENT)
Dept: RADIATION ONCOLOGY | Age: 59
End: 2024-02-29
Payer: COMMERCIAL

## 2024-03-01 ENCOUNTER — APPOINTMENT (OUTPATIENT)
Dept: RADIATION ONCOLOGY | Age: 59
End: 2024-03-01
Payer: COMMERCIAL

## 2024-03-01 LAB
CULTURE: ABNORMAL
SPECIMEN DESCRIPTION: ABNORMAL

## 2024-03-01 NOTE — RESULT ENCOUNTER NOTE
Urine culture does show evidence of E. coli.  It is sensitive to the antibiotic.  Please finish out antibiotic and follow-up with PCP or return to [facility]

## 2024-03-04 ENCOUNTER — HOSPITAL ENCOUNTER (OUTPATIENT)
Dept: RADIATION ONCOLOGY | Age: 59
Discharge: HOME | End: 2024-03-04
Payer: COMMERCIAL

## 2024-03-04 PROCEDURE — 77386 HC NTSTY MODUL RAD TX DLVR CPLX: CPT | Performed by: RADIOLOGY

## 2024-03-04 PROCEDURE — 77014 CHG CT GUIDANCE RADIATION THERAPY FLDS PLACEMENT: CPT | Performed by: RADIOLOGY

## 2024-03-04 RX ORDER — APIXABAN 5 MG/1
5 TABLET, FILM COATED ORAL 2 TIMES DAILY
Qty: 60 TABLET | Refills: 5 | Status: SHIPPED | OUTPATIENT
Start: 2024-03-04

## 2024-03-04 RX ORDER — DULOXETIN HYDROCHLORIDE 60 MG/1
60 CAPSULE, DELAYED RELEASE ORAL 2 TIMES DAILY
Qty: 60 CAPSULE | Refills: 5 | Status: SHIPPED | OUTPATIENT
Start: 2024-03-04

## 2024-03-05 ENCOUNTER — HOSPITAL ENCOUNTER (OUTPATIENT)
Dept: RADIATION ONCOLOGY | Age: 59
Discharge: HOME | End: 2024-03-05
Payer: COMMERCIAL

## 2024-03-05 PROCEDURE — 77336 RADIATION PHYSICS CONSULT: CPT | Performed by: RADIOLOGY

## 2024-03-05 PROCEDURE — 77386 HC NTSTY MODUL RAD TX DLVR CPLX: CPT | Performed by: RADIOLOGY

## 2024-03-05 PROCEDURE — 77427 RADIATION TX MANAGEMENT X5: CPT | Performed by: RADIOLOGY

## 2024-03-05 PROCEDURE — 77014 CHG CT GUIDANCE RADIATION THERAPY FLDS PLACEMENT: CPT | Performed by: RADIOLOGY

## 2024-03-05 NOTE — PROGRESS NOTES
capsule 1    metoprolol tartrate (LOPRESSOR) 50 MG tablet TAKE 1 TABLET BY MOUTH TWICE A  tablet 3    atorvastatin (LIPITOR) 40 MG tablet TAKE 1 TABLET BY MOUTH EVERY DAY 90 tablet 3    pantoprazole (PROTONIX) 40 MG tablet TAKE 1 TABLET BY MOUTH EVERY DAY BEFORE BREAKFAST 30 tablet 5    oxybutynin (DITROPAN) 5 MG tablet Take 1 tablet by mouth 2 times daily      furosemide (LASIX) 20 MG tablet TAKE 1 TABLET BY MOUTH EVERY DAY 90 tablet 0    loratadine (CLARITIN) 10 MG tablet TAKE 1 TABLET BY MOUTH EVERY DAY (Patient taking differently: as needed) 30 tablet 0    tiotropium (SPIRIVA RESPIMAT) 1.25 MCG/ACT AERS inhaler Inhale 2 puffs into the lungs daily 1 each 0    albuterol-ipratropium (COMBIVENT RESPIMAT)  MCG/ACT AERS inhaler Inhale 1 puff into the lungs every 6 hours 1 each 1    ARIPiprazole (ABILIFY) 5 MG tablet Take 1 tablet by mouth daily (Patient taking differently: Take 1 tablet by mouth as needed) 90 tablet 3    meclizine (ANTIVERT) 25 MG tablet TAKE 1 TABLET BY MOUTH EVERY 8 HOURS AS NEEDED      diclofenac sodium (VOLTAREN) 1 % GEL APPLY TO AFFECTED AREA THREE TIMES PER DAY       No current facility-administered medications for this encounter.          This is an up-to-date medication list.    Please take this list to your next care provider, and discard any previous medication lists.

## 2024-03-13 ENCOUNTER — TELEPHONE (OUTPATIENT)
Dept: ONCOLOGY | Age: 59
End: 2024-03-13

## 2024-03-13 ENCOUNTER — OFFICE VISIT (OUTPATIENT)
Dept: PRIMARY CARE CLINIC | Age: 59
End: 2024-03-13
Payer: COMMERCIAL

## 2024-03-13 VITALS
RESPIRATION RATE: 18 BRPM | BODY MASS INDEX: 38.07 KG/M2 | OXYGEN SATURATION: 97 % | HEIGHT: 64 IN | HEART RATE: 105 BPM | SYSTOLIC BLOOD PRESSURE: 128 MMHG | DIASTOLIC BLOOD PRESSURE: 86 MMHG | WEIGHT: 223 LBS | TEMPERATURE: 97.8 F

## 2024-03-13 DIAGNOSIS — S86.912A MUSCLE STRAIN OF LEFT LOWER LEG, INITIAL ENCOUNTER: ICD-10-CM

## 2024-03-13 DIAGNOSIS — R30.0 DYSURIA: ICD-10-CM

## 2024-03-13 DIAGNOSIS — J10.1 INFLUENZA B: ICD-10-CM

## 2024-03-13 DIAGNOSIS — R05.9 COUGH, UNSPECIFIED TYPE: Primary | ICD-10-CM

## 2024-03-13 LAB
APPEARANCE FLUID: ABNORMAL
BILIRUBIN, POC: ABNORMAL
BLOOD URINE, POC: ABNORMAL
CLARITY, POC: ABNORMAL
COLOR, POC: YELLOW
GLUCOSE URINE, POC: NEGATIVE
INFLUENZA A ANTIBODY: NEGATIVE
INFLUENZA B ANTIBODY: POSITIVE
KETONES, POC: ABNORMAL
LEUKOCYTE EST, POC: ABNORMAL
Lab: NORMAL
NITRITE, POC: POSITIVE
PERFORMING INSTRUMENT: NORMAL
PH, POC: 5.5
PROTEIN, POC: ABNORMAL
QC PASS/FAIL: NORMAL
SARS-COV-2, POC: NORMAL
SPECIFIC GRAVITY, POC: 1.03
UROBILINOGEN, POC: 1

## 2024-03-13 PROCEDURE — 3017F COLORECTAL CA SCREEN DOC REV: CPT | Performed by: FAMILY MEDICINE

## 2024-03-13 PROCEDURE — 81002 URINALYSIS NONAUTO W/O SCOPE: CPT | Performed by: FAMILY MEDICINE

## 2024-03-13 PROCEDURE — G8484 FLU IMMUNIZE NO ADMIN: HCPCS | Performed by: FAMILY MEDICINE

## 2024-03-13 PROCEDURE — 87804 INFLUENZA ASSAY W/OPTIC: CPT | Performed by: FAMILY MEDICINE

## 2024-03-13 PROCEDURE — G8427 DOCREV CUR MEDS BY ELIG CLIN: HCPCS | Performed by: FAMILY MEDICINE

## 2024-03-13 PROCEDURE — G8417 CALC BMI ABV UP PARAM F/U: HCPCS | Performed by: FAMILY MEDICINE

## 2024-03-13 PROCEDURE — 99214 OFFICE O/P EST MOD 30 MIN: CPT | Performed by: FAMILY MEDICINE

## 2024-03-13 PROCEDURE — 87426 SARSCOV CORONAVIRUS AG IA: CPT | Performed by: FAMILY MEDICINE

## 2024-03-13 PROCEDURE — 3079F DIAST BP 80-89 MM HG: CPT | Performed by: FAMILY MEDICINE

## 2024-03-13 PROCEDURE — 1036F TOBACCO NON-USER: CPT | Performed by: FAMILY MEDICINE

## 2024-03-13 PROCEDURE — 3074F SYST BP LT 130 MM HG: CPT | Performed by: FAMILY MEDICINE

## 2024-03-13 RX ORDER — SULFAMETHOXAZOLE AND TRIMETHOPRIM 800; 160 MG/1; MG/1
1 TABLET ORAL 2 TIMES DAILY
Qty: 20 TABLET | Refills: 0 | Status: SHIPPED | OUTPATIENT
Start: 2024-03-13 | End: 2024-03-23

## 2024-03-13 RX ORDER — OSELTAMIVIR PHOSPHATE 75 MG/1
75 CAPSULE ORAL 2 TIMES DAILY
Qty: 10 CAPSULE | Refills: 0 | Status: SHIPPED | OUTPATIENT
Start: 2024-03-13 | End: 2024-03-18

## 2024-03-13 RX ORDER — BENZONATATE 100 MG/1
100 CAPSULE ORAL 3 TIMES DAILY PRN
Qty: 30 CAPSULE | Refills: 0 | Status: SHIPPED | OUTPATIENT
Start: 2024-03-13 | End: 2024-03-20

## 2024-03-13 ASSESSMENT — ENCOUNTER SYMPTOMS
BLOOD IN STOOL: 0
ALLERGIC/IMMUNOLOGIC NEGATIVE: 1
COLOR CHANGE: 0
SINUS PRESSURE: 0
BACK PAIN: 0
NAUSEA: 0
WHEEZING: 0
FACIAL SWELLING: 0
SORE THROAT: 0
SHORTNESS OF BREATH: 0
COUGH: 1
APNEA: 0
VOMITING: 0
PHOTOPHOBIA: 0
CHEST TIGHTNESS: 0
DIARRHEA: 0
ABDOMINAL PAIN: 0

## 2024-03-13 NOTE — PROGRESS NOTES
Chief Complaint:   Chief Complaint   Patient presents with    Cough     For last 2 weeks.      Congestion    Urinary Tract Infection    Leg Problem     Sensation in right leg.        Cough  This is a new problem. The current episode started in the past 7 days. The problem has been gradually worsening. The cough is Productive of sputum. Pertinent negatives include no chest pain, headaches, myalgias, rash, sore throat, shortness of breath or wheezing. Nothing aggravates the symptoms.   Urinary Tract Infection  This is a new problem. The current episode started in the past 7 days. The problem is unchanged.   Leg Pain   The incident occurred more than 1 week ago. There was no injury mechanism. The pain is present in the left leg. The pain is at a severity of 1/10. The pain is mild.       Patient Active Problem List   Diagnosis    Palpitations    Atypical chest pain    Atrial septal aneurysm    Type 2 diabetes mellitus without complication, without long-term current use of insulin (HCC)    Hyperlipidemia    Essential hypertension    Pulmonary embolism, bilateral (HCC)    Acute electrocardiogram changes    Pulmonary infarct (HCC)    Lactic acidosis    Pulmonary embolus (HCC)    Right heart enlargement    S/P admn tPA in diff fac w/n last 24 hr bef adm to crnt fac    Anemia    Asymptomatic bacteriuria    Primary hypertension    Aneurysm of ascending aorta without rupture (HCC)    Abdominal pain, epigastric    Malignant neoplasm of esophagus (HCC)    Anxiety    Cancer of esophagus (HCC)       Past Medical History:   Diagnosis Date    Aorta disorder (HCC)     ascending aortic dilation 4.3 cm echo 10/6/23 per Vascular note [facility] 10/20/23 in Epic    Atrial septal aneurysm     Cancer (HCC)     Cancer of esophagus (HCC) 11/13/2023    Gall bladder disease     Herniated disc     Hyperlipidemia     Hypertension     Infectious lymphangitis of deep lymphatics     Kidney stone     CHRISTIAN (obstructive sleep apnea)     will be fitted in

## 2024-03-13 NOTE — TELEPHONE ENCOUNTER
Received call from pt re: [place] [email or web address] [facility] Application.  Pt reported that she had previously completed application but unfortunately it was unable to locate.  Pt completed application on this date.  Application and letter confirming treatment status sent to [facility] per pt's request; copy to be scanned under media tab.  No additional needs identified at this time.  Reviewed role of oncology SW and encouraged pt to notify this provider if additional needs arise.    Iwona Miranda, MSW, LISW-S  Oncology Social Worker          electronic

## 2024-03-20 ENCOUNTER — TELEPHONE (OUTPATIENT)
Dept: PRIMARY CARE CLINIC | Age: 59
End: 2024-03-20

## 2024-03-20 DIAGNOSIS — R05.1 ACUTE COUGH: Primary | ICD-10-CM

## 2024-03-20 RX ORDER — BENZONATATE 100 MG/1
100 CAPSULE ORAL 3 TIMES DAILY PRN
Qty: 30 CAPSULE | Refills: 0 | Status: SHIPPED | OUTPATIENT
Start: 2024-03-20 | End: 2024-03-27

## 2024-03-20 NOTE — TELEPHONE ENCOUNTER
Coughing a lot-yellow/green sputum, afebrile, no appetite, requesting refill of the tessalon pearles also. Pt has 2 more days of the ATB  She's wondering if she should get a chest xray

## 2024-03-21 DIAGNOSIS — F41.9 ANXIETY: ICD-10-CM

## 2024-03-21 DIAGNOSIS — S81.802D WOUND OF LEFT LOWER EXTREMITY, SUBSEQUENT ENCOUNTER: ICD-10-CM

## 2024-03-22 ENCOUNTER — OFFICE VISIT (OUTPATIENT)
Dept: FAMILY MEDICINE CLINIC | Age: 59
End: 2024-03-22
Payer: COMMERCIAL

## 2024-03-22 VITALS
TEMPERATURE: 98.2 F | DIASTOLIC BLOOD PRESSURE: 72 MMHG | OXYGEN SATURATION: 97 % | HEIGHT: 64 IN | HEART RATE: 84 BPM | SYSTOLIC BLOOD PRESSURE: 98 MMHG | WEIGHT: 226 LBS | BODY MASS INDEX: 38.58 KG/M2

## 2024-03-22 DIAGNOSIS — D64.9 ANEMIA, UNSPECIFIED TYPE: ICD-10-CM

## 2024-03-22 DIAGNOSIS — R58 ECCHYMOSIS: ICD-10-CM

## 2024-03-22 DIAGNOSIS — M79.89 LEG SWELLING: ICD-10-CM

## 2024-03-22 DIAGNOSIS — R22.43 LOCALIZED SWELLING OF BOTH LOWER LEGS: Primary | ICD-10-CM

## 2024-03-22 DIAGNOSIS — R06.02 SOB (SHORTNESS OF BREATH): ICD-10-CM

## 2024-03-22 DIAGNOSIS — R06.09 OTHER FORMS OF DYSPNEA: ICD-10-CM

## 2024-03-22 PROCEDURE — 3074F SYST BP LT 130 MM HG: CPT | Performed by: FAMILY MEDICINE

## 2024-03-22 PROCEDURE — 3017F COLORECTAL CA SCREEN DOC REV: CPT | Performed by: FAMILY MEDICINE

## 2024-03-22 PROCEDURE — 99213 OFFICE O/P EST LOW 20 MIN: CPT | Performed by: FAMILY MEDICINE

## 2024-03-22 PROCEDURE — G8417 CALC BMI ABV UP PARAM F/U: HCPCS | Performed by: FAMILY MEDICINE

## 2024-03-22 PROCEDURE — 1036F TOBACCO NON-USER: CPT | Performed by: FAMILY MEDICINE

## 2024-03-22 PROCEDURE — G8484 FLU IMMUNIZE NO ADMIN: HCPCS | Performed by: FAMILY MEDICINE

## 2024-03-22 PROCEDURE — G8427 DOCREV CUR MEDS BY ELIG CLIN: HCPCS | Performed by: FAMILY MEDICINE

## 2024-03-22 PROCEDURE — 3078F DIAST BP <80 MM HG: CPT | Performed by: FAMILY MEDICINE

## 2024-03-22 RX ORDER — BUSPIRONE HYDROCHLORIDE 10 MG/1
10 TABLET ORAL 3 TIMES DAILY
Qty: 90 TABLET | Refills: 0 | Status: SHIPPED | OUTPATIENT
Start: 2024-03-22

## 2024-03-22 RX ORDER — FUROSEMIDE 20 MG/1
20 TABLET ORAL DAILY
Qty: 90 TABLET | Refills: 0 | Status: SHIPPED | OUTPATIENT
Start: 2024-03-22

## 2024-03-22 ASSESSMENT — ENCOUNTER SYMPTOMS
PHOTOPHOBIA: 0
CONSTIPATION: 0
DIARRHEA: 0
VOMITING: 0
COLOR CHANGE: 1
BACK PAIN: 0
SHORTNESS OF BREATH: 0
SORE THROAT: 0
BLOOD IN STOOL: 0
NAUSEA: 0
ABDOMINAL PAIN: 0
COUGH: 0

## 2024-03-22 NOTE — PROGRESS NOTES
Anita Dave (:  1965) is a 58 y.o. female,Established patient, here for evaluation of the following chief complaint(s):  Foot Problem (Toes changing color/purple/bruising /States dong chemo and radiation )         ASSESSMENT/PLAN:  1. Localized swelling of both lower legs  -     CBC with Auto Differential; Future  -     Comprehensive Metabolic Panel with Bilirubin; Future  -     D-Dimer, Quantitative; Future  -     Brain Natriuretic Peptide; Future  2. Ecchymosis  -     CBC with Auto Differential; Future  -     Comprehensive Metabolic Panel with Bilirubin; Future  -     D-Dimer, Quantitative; Future  -     Brain Natriuretic Peptide; Future  3. Anemia, unspecified type  -     CBC with Auto Differential; Future  -     Comprehensive Metabolic Panel with Bilirubin; Future  -     D-Dimer, Quantitative; Future  -     Brain Natriuretic Peptide; Future  4. Other forms of dyspnea  -     Brain Natriuretic Peptide; Future  At this time we will decrease her metoprolol to half pill twice daily she will continue to monitor her blood pressure.  May increase her Lasix temporarily to 20 mg 1-2 times daily for the leg swelling.  Further evaluation based on lab results.    No follow-ups on file.         Subjective   SUBJECTIVE/OBJECTIVE:  Foot Problem  Associated symptoms include fatigue. Pertinent negatives include no abdominal pain, chest pain, chills, congestion, coughing, fever, headaches, myalgias, nausea, sore throat, vomiting or weakness.     Patient presents today for several day history of worsening bilateral lower extremity swelling, bruising, and color change to the toes.  Denies any trauma or injury.  Denies any chest pain or shortness of breath.  Denies any loss of taste or smell.  Denies any recent systemic illness.  Patient is undergoing chemo and radiation however.  Continues with Eliquis 5 mg twice daily as well.  No recent changes to medications or skipped doses.  Patient has been dealing with

## 2024-03-23 ENCOUNTER — HOSPITAL ENCOUNTER (OUTPATIENT)
Age: 59
Discharge: HOME | End: 2024-03-23
Payer: COMMERCIAL

## 2024-03-23 DIAGNOSIS — D64.9 ANEMIA, UNSPECIFIED TYPE: ICD-10-CM

## 2024-03-23 DIAGNOSIS — R22.43 LOCALIZED SWELLING OF BOTH LOWER LEGS: ICD-10-CM

## 2024-03-23 DIAGNOSIS — R58 ECCHYMOSIS: ICD-10-CM

## 2024-03-23 DIAGNOSIS — R06.09 OTHER FORMS OF DYSPNEA: ICD-10-CM

## 2024-03-23 LAB
ALBUMIN SERPL-MCNC: 4.2 G/DL (ref 3.5–5.2)
ALP SERPL-CCNC: 127 U/L (ref 35–104)
ALT SERPL-CCNC: 15 U/L (ref 0–32)
ANION GAP SERPL CALCULATED.3IONS-SCNC: 12 MMOL/L (ref 7–16)
AST SERPL-CCNC: 18 U/L (ref 0–31)
BASOPHILS # BLD: 0.01 K/UL (ref 0–0.2)
BASOPHILS NFR BLD: 0 % (ref 0–2)
BILIRUB DIRECT SERPL-MCNC: <0.2 MG/DL (ref 0–0.3)
BILIRUB INDIRECT SERPL-MCNC: ABNORMAL MG/DL (ref 0–1)
BILIRUB SERPL-MCNC: 0.3 MG/DL (ref 0–1.2)
BNP SERPL-MCNC: <36 PG/ML (ref 0–125)
BUN SERPL-MCNC: 8 MG/DL (ref 6–20)
CALCIUM SERPL-MCNC: 9.5 MG/DL (ref 8.6–10.2)
CHLORIDE SERPL-SCNC: 105 MMOL/L (ref 98–107)
CO2 SERPL-SCNC: 22 MMOL/L (ref 22–29)
CREAT SERPL-MCNC: 0.7 MG/DL (ref 0.5–1)
D DIMER: <200 NG/ML DDU (ref 0–232)
EOSINOPHIL # BLD: 0.04 K/UL (ref 0.05–0.5)
EOSINOPHILS RELATIVE PERCENT: 1 % (ref 0–6)
ERYTHROCYTE [DISTWIDTH] IN BLOOD BY AUTOMATED COUNT: 19.6 % (ref 11.5–15)
GFR SERPL CREATININE-BSD FRML MDRD: >60 ML/MIN/1.73M2
GLUCOSE SERPL-MCNC: 96 MG/DL (ref 74–99)
HCT VFR BLD AUTO: 34.8 % (ref 34–48)
HGB BLD-MCNC: 11 G/DL (ref 11.5–15.5)
IMM GRANULOCYTES # BLD AUTO: 0.03 K/UL (ref 0–0.58)
IMM GRANULOCYTES NFR BLD: 1 % (ref 0–5)
LYMPHOCYTES NFR BLD: 1.13 K/UL (ref 1.5–4)
LYMPHOCYTES RELATIVE PERCENT: 20 % (ref 20–42)
MCH RBC QN AUTO: 30.4 PG (ref 26–35)
MCHC RBC AUTO-ENTMCNC: 31.6 G/DL (ref 32–34.5)
MCV RBC AUTO: 96.1 FL (ref 80–99.9)
MONOCYTES NFR BLD: 0.39 K/UL (ref 0.1–0.95)
MONOCYTES NFR BLD: 7 % (ref 2–12)
NEUTROPHILS NFR BLD: 71 % (ref 43–80)
NEUTS SEG NFR BLD: 3.98 K/UL (ref 1.8–7.3)
PLATELET # BLD AUTO: 350 K/UL (ref 130–450)
PMV BLD AUTO: 9.7 FL (ref 7–12)
POTASSIUM SERPL-SCNC: 4 MMOL/L (ref 3.5–5)
PROT SERPL-MCNC: 7 G/DL (ref 6.4–8.3)
RBC # BLD AUTO: 3.62 M/UL (ref 3.5–5.5)
SODIUM SERPL-SCNC: 139 MMOL/L (ref 132–146)
WBC OTHER # BLD: 5.6 K/UL (ref 4.5–11.5)

## 2024-03-23 PROCEDURE — 82248 BILIRUBIN DIRECT: CPT

## 2024-03-23 PROCEDURE — 83880 ASSAY OF NATRIURETIC PEPTIDE: CPT

## 2024-03-23 PROCEDURE — 85025 COMPLETE CBC W/AUTO DIFF WBC: CPT

## 2024-03-23 PROCEDURE — 36415 COLL VENOUS BLD VENIPUNCTURE: CPT

## 2024-03-23 PROCEDURE — 85379 FIBRIN DEGRADATION QUANT: CPT

## 2024-03-23 PROCEDURE — 80053 COMPREHEN METABOLIC PANEL: CPT

## 2024-03-26 ENCOUNTER — OFFICE VISIT (OUTPATIENT)
Dept: PRIMARY CARE CLINIC | Age: 59
End: 2024-03-26
Payer: COMMERCIAL

## 2024-03-26 VITALS
WEIGHT: 226 LBS | SYSTOLIC BLOOD PRESSURE: 126 MMHG | DIASTOLIC BLOOD PRESSURE: 86 MMHG | OXYGEN SATURATION: 98 % | HEIGHT: 64 IN | HEART RATE: 89 BPM | BODY MASS INDEX: 38.58 KG/M2 | TEMPERATURE: 97.6 F | RESPIRATION RATE: 18 BRPM

## 2024-03-26 DIAGNOSIS — I73.9 PAD (PERIPHERAL ARTERY DISEASE) (HCC): Primary | ICD-10-CM

## 2024-03-26 DIAGNOSIS — C15.9 MALIGNANT NEOPLASM OF ESOPHAGUS, UNSPECIFIED LOCATION (HCC): ICD-10-CM

## 2024-03-26 PROCEDURE — 99214 OFFICE O/P EST MOD 30 MIN: CPT | Performed by: FAMILY MEDICINE

## 2024-03-26 PROCEDURE — 3079F DIAST BP 80-89 MM HG: CPT | Performed by: FAMILY MEDICINE

## 2024-03-26 PROCEDURE — G8484 FLU IMMUNIZE NO ADMIN: HCPCS | Performed by: FAMILY MEDICINE

## 2024-03-26 PROCEDURE — G8427 DOCREV CUR MEDS BY ELIG CLIN: HCPCS | Performed by: FAMILY MEDICINE

## 2024-03-26 PROCEDURE — G8417 CALC BMI ABV UP PARAM F/U: HCPCS | Performed by: FAMILY MEDICINE

## 2024-03-26 PROCEDURE — 3017F COLORECTAL CA SCREEN DOC REV: CPT | Performed by: FAMILY MEDICINE

## 2024-03-26 PROCEDURE — 1036F TOBACCO NON-USER: CPT | Performed by: FAMILY MEDICINE

## 2024-03-26 PROCEDURE — 3074F SYST BP LT 130 MM HG: CPT | Performed by: FAMILY MEDICINE

## 2024-03-26 ASSESSMENT — ENCOUNTER SYMPTOMS
ALLERGIC/IMMUNOLOGIC NEGATIVE: 1
VOMITING: 0
COLOR CHANGE: 1
WHEEZING: 0
DIARRHEA: 0
SHORTNESS OF BREATH: 0
BACK PAIN: 0
NAUSEA: 0
PHOTOPHOBIA: 0
SORE THROAT: 0
BLOOD IN STOOL: 0
COUGH: 0
FACIAL SWELLING: 0
CHEST TIGHTNESS: 0
SINUS PRESSURE: 0
APNEA: 0
ABDOMINAL PAIN: 0

## 2024-03-26 NOTE — PROGRESS NOTES
Chief Complaint:   Chief Complaint   Patient presents with    Follow-up     Toe discoloration from express.         HPIdoing better  Toes have better coloration    Patient Active Problem List   Diagnosis    Palpitations    Atypical chest pain    Atrial septal aneurysm    Type 2 diabetes mellitus without complication, without long-term current use of insulin (HCC)    Hyperlipidemia    Essential hypertension    Pulmonary embolism, bilateral (HCC)    Acute electrocardiogram changes    Pulmonary infarct (HCC)    Lactic acidosis    Pulmonary embolus (HCC)    Right heart enlargement    S/P admn tPA in diff fac w/n last 24 hr bef adm to crnt fac    Anemia    Asymptomatic bacteriuria    Primary hypertension    Aneurysm of ascending aorta without rupture (HCC)    Abdominal pain, epigastric    Malignant neoplasm of esophagus (HCC)    Anxiety    Cancer of esophagus (HCC)    Localized swelling of both lower legs    Ecchymosis       Past Medical History:   Diagnosis Date    Aorta disorder (HCC)     ascending aortic dilation 4.3 cm echo 10/6/23 per Vascular note [facility] 10/20/23 in Epic    Atrial septal aneurysm     Cancer (HCC)     Cancer of esophagus (HCC) 2023    Gall bladder disease     Herniated disc     Hyperlipidemia     Hypertension     Infectious lymphangitis of deep lymphatics     Kidney stone     CHRISTIAN (obstructive sleep apnea)     will be fitted in 2023 for cpap per pt    PE (pulmonary thromboembolism) (HCC) 2023    . On Eliquis follows Vascular at [facility] last seen 10/6/23 note in EPIC    Pulmonary hypertension (HCC)     Sleep apnea        Past Surgical History:   Procedure Laterality Date    CATARACT EXTRACTION      bilateral 2 weeks apart with lense implant     SECTION      COLONOSCOPY      states she is overdue    UPPER GASTROINTESTINAL ENDOSCOPY N/A 2023    EGD BIOPSY performed by Justin Sellers MD at [facility] ENDOSCOPY    WISDOM TOOTH EXTRACTION      WRIST SURGERY Right 2021

## 2024-04-03 DIAGNOSIS — F41.9 ANXIETY: ICD-10-CM

## 2024-04-03 RX ORDER — BUSPIRONE HYDROCHLORIDE 10 MG/1
10 TABLET ORAL 2 TIMES DAILY
Qty: 180 TABLET | Refills: 1 | OUTPATIENT
Start: 2024-04-03

## 2024-04-05 ENCOUNTER — TELEPHONE (OUTPATIENT)
Dept: PRIMARY CARE CLINIC | Age: 59
End: 2024-04-05

## 2024-04-05 RX ORDER — CEFDINIR 300 MG/1
300 CAPSULE ORAL 2 TIMES DAILY
Qty: 20 CAPSULE | Refills: 0 | Status: SHIPPED | OUTPATIENT
Start: 2024-04-05 | End: 2024-04-15

## 2024-04-05 NOTE — TELEPHONE ENCOUNTER
The pt is calling because she is having frequent urination, back pain, and abdomen pressure, she is asking if something can be sent over to [facility] on [address]

## 2024-04-10 ENCOUNTER — HOSPITAL ENCOUNTER (OUTPATIENT)
Dept: INTERVENTIONAL RADIOLOGY/VASCULAR | Age: 59
Discharge: HOME | End: 2024-04-12
Attending: FAMILY MEDICINE
Payer: COMMERCIAL

## 2024-04-10 DIAGNOSIS — R10.13 EPIGASTRIC PAIN: ICD-10-CM

## 2024-04-10 DIAGNOSIS — I73.9 PAD (PERIPHERAL ARTERY DISEASE) (HCC): ICD-10-CM

## 2024-04-10 PROCEDURE — 93922 UPR/L XTREMITY ART 2 LEVELS: CPT

## 2024-04-10 PROCEDURE — 93922 UPR/L XTREMITY ART 2 LEVELS: CPT | Performed by: SURGERY

## 2024-04-15 RX ORDER — SUCRALFATE 1 G/1
1 TABLET ORAL 3 TIMES DAILY
Qty: 21 TABLET | Refills: 0 | Status: SHIPPED | OUTPATIENT
Start: 2024-04-15 | End: 2024-04-22

## 2024-04-18 ENCOUNTER — HOSPITAL ENCOUNTER (OUTPATIENT)
Dept: RADIATION ONCOLOGY | Age: 59
Discharge: HOME | End: 2024-04-18

## 2024-04-18 VITALS
DIASTOLIC BLOOD PRESSURE: 83 MMHG | SYSTOLIC BLOOD PRESSURE: 121 MMHG | BODY MASS INDEX: 39.07 KG/M2 | RESPIRATION RATE: 20 BRPM | HEART RATE: 78 BPM | TEMPERATURE: 97.3 F | WEIGHT: 227.6 LBS

## 2024-04-18 DIAGNOSIS — C15.5 MALIGNANT NEOPLASM OF LOWER THIRD OF ESOPHAGUS (HCC): Primary | ICD-10-CM

## 2024-04-18 PROCEDURE — 99024 POSTOP FOLLOW-UP VISIT: CPT | Performed by: NURSE PRACTITIONER

## 2024-04-18 RX ORDER — BENZONATATE 100 MG/1
100 CAPSULE ORAL 3 TIMES DAILY PRN
Qty: 30 CAPSULE | Refills: 0 | Status: SHIPPED | OUTPATIENT
Start: 2024-04-18 | End: 2024-04-25

## 2024-04-18 ASSESSMENT — PAIN DESCRIPTION - LOCATION: LOCATION: CHEST

## 2024-04-18 ASSESSMENT — PAIN DESCRIPTION - ORIENTATION: ORIENTATION: MID;LOWER

## 2024-04-18 ASSESSMENT — PAIN SCALES - GENERAL: PAINLEVEL_OUTOF10: 6

## 2024-04-18 ASSESSMENT — PAIN DESCRIPTION - DESCRIPTORS: DESCRIPTORS: OTHER (COMMENT)

## 2024-04-18 NOTE — PROGRESS NOTES
Anita JONES Jolie  4/18/2024  10:31 AM      Vitals:    04/18/24 1028   Pulse: 78   Resp: 20   Temp: 97.3 °F (36.3 °C)    :    Wt Readings from Last 3 Encounters:   04/18/24 103.2 kg (227 lb 9.6 oz)   03/26/24 102.5 kg (226 lb)   03/22/24 102.5 kg (226 lb)                Current Outpatient Medications:     sucralfate (CARAFATE) 1 GM tablet, Take 1 tablet by mouth in the morning, at noon, and at bedtime for 7 days Crush and dissolve in medicine cup full of water and drink, Disp: 21 tablet, Rfl: 0    mupirocin (BACTROBAN) 2 % ointment, APPLY TO AFFECTED AREA 3 TIMES A DAY, Disp: 22 g, Rfl: 1    busPIRone (BUSPAR) 10 MG tablet, TAKE 1 TABLET BY MOUTH THREE TIMES A DAY, Disp: 90 tablet, Rfl: 0    furosemide (LASIX) 20 MG tablet, Take 1 tablet by mouth daily, Disp: 90 tablet, Rfl: 0    ELIQUIS 5 MG TABS tablet, TAKE 1 TABLET BY MOUTH TWICE A DAY, Disp: 60 tablet, Rfl: 5    DULoxetine (CYMBALTA) 60 MG extended release capsule, Take 1 capsule by mouth 2 times daily, Disp: 60 capsule, Rfl: 5    dexAMETHasone (DECADRON) 4 MG tablet, TAKE THREE TABLETS THE NIGHT BEFORE TAXOL TREAMENT, Disp: , Rfl:     ondansetron (ZOFRAN) 8 MG tablet, Take 1 tablet by mouth every 8 hours as needed for Nausea or Vomiting Monday's once nightly, Tues. & Wednesday's after chemo TID every 8 hours, then every 8 hours PRN, Disp: , Rfl:     Magic Mouthwash (MIRACLE MOUTHWASH), Take 10 mLs by mouth 4 times daily as needed for Irritation, Disp: 400 mL, Rfl: 1    clobetasol (TEMOVATE) 0.05 % external solution, APPLY TO SCALP EVERY DAY, Disp: , Rfl:     ammonium lactate (LAC-HYDRIN) 12 % lotion, APPLY AS DIRECTED TWICE DAILY, Disp: , Rfl:     fluocinonide (LIDEX) 0.05 % external solution, APPLY TO AFFECTED AREA EVERY DAY, Disp: , Rfl:     lidocaine-prilocaine (EMLA) 2.5-2.5 % cream, APPLY TO MEDIPORT AS NEEDED, Disp: , Rfl:     meloxicam (MOBIC) 15 MG tablet, TAKE 1 TABLET BY MOUTH EVERY DAY (Patient taking differently: as needed TAKE 1 TABLET BY MOUTH 
(ANTIVERT) 25 MG tablet TAKE 1 TABLET BY MOUTH EVERY 8 HOURS AS NEEDED      diclofenac sodium (VOLTAREN) 1 % GEL APPLY TO AFFECTED AREA THREE TIMES PER DAY       No current facility-administered medications for this encounter.         Physical Examination:   Vitals:    04/18/24 1028   BP: 121/83   Pulse: 78   Resp: 20   Temp: 97.3 °F (36.3 °C)       Wt Readings from Last 3 Encounters:   04/18/24 103.2 kg (227 lb 9.6 oz)   03/26/24 102.5 kg (226 lb)   03/22/24 102.5 kg (226 lb)       Alert, ambulates slowly with cane. Pleasant and conversant.        ASSESSMENT/PLAN:     Adenocarcinoma of the  distal esophagus. S/p concurrent chemoradiation therapy completion, XRT completed  03/05/2024.     S/p EGD with distal esophagus biopsy with no evidence of dysplasia or malignancy on 04/01/2024 ([facility])     The patient is scheduled for L thoracoabdominal esophagectomy at [facility] on 04/23/2024 with Dr. Lima.     The patient is anxious about upcoming surgery, emotional support provided.     I discussed follow up plans with Anita Dave.  Radiation Oncology follow-up visit 3 months.     Anita Dave is to follow up with other physicians/ APPs involved in their care as directed (including but not limited to Medical Oncology, Thoracic Surgery, Pulmonology and Primary Care).      The patient was given our contact number in the event that if at any time they change their mind and would like to return to the clinic to see either myself or one of the Radiation Oncologists, they can simply call us and we would be happy to see them sooner.        Thank you for involving us in the management of this extremely pleasant patient.        More than 15 min was in direct contact with pt coordinating/giving care. >50% of the visit was spent in counseling the pt on the following:  Follow up care    The nurses notes were reviewed and incorporated into this assessment and plan.      Questions answered to apparent satisfaction.        Wendi SMALLS

## 2024-04-24 ENCOUNTER — TELEPHONE (OUTPATIENT)
Dept: PRIMARY CARE CLINIC | Age: 59
End: 2024-04-24

## 2024-04-24 RX ORDER — SULFAMETHOXAZOLE AND TRIMETHOPRIM 800; 160 MG/1; MG/1
1 TABLET ORAL 2 TIMES DAILY
Qty: 20 TABLET | Refills: 0 | Status: SHIPPED | OUTPATIENT
Start: 2024-04-24 | End: 2024-05-04

## 2024-04-24 NOTE — TELEPHONE ENCOUNTER
Patient asking if you can prescribe another ABX for a UTI. Patient is having frequency, urgency, low back/ lower abdominal pain. Says she is having more UTI's after getting her chemo.     [email or web address]

## 2024-05-13 ENCOUNTER — TELEPHONE (OUTPATIENT)
Dept: PRIMARY CARE CLINIC | Age: 59
End: 2024-05-13

## 2024-05-13 RX ORDER — NITROFURANTOIN 25; 75 MG/1; MG/1
100 CAPSULE ORAL 2 TIMES DAILY
Qty: 20 CAPSULE | Refills: 0 | Status: SHIPPED | OUTPATIENT
Start: 2024-05-13 | End: 2024-05-23

## 2024-05-15 ENCOUNTER — TELEPHONE (OUTPATIENT)
Dept: PRIMARY CARE CLINIC | Age: 59
End: 2024-05-15

## 2024-05-15 NOTE — TELEPHONE ENCOUNTER
Patient asking if you could send script for D-mannose she would like to see if her insurance will cover.

## 2024-06-03 DIAGNOSIS — R06.02 SOB (SHORTNESS OF BREATH): ICD-10-CM

## 2024-06-03 DIAGNOSIS — F41.9 ANXIETY: ICD-10-CM

## 2024-06-03 DIAGNOSIS — M79.89 LEG SWELLING: ICD-10-CM

## 2024-06-04 RX ORDER — LOSARTAN POTASSIUM 25 MG/1
25 TABLET ORAL DAILY
Qty: 90 TABLET | Refills: 3 | OUTPATIENT
Start: 2024-06-04

## 2024-06-04 RX ORDER — DULOXETIN HYDROCHLORIDE 60 MG/1
60 CAPSULE, DELAYED RELEASE ORAL 2 TIMES DAILY
Qty: 180 CAPSULE | Refills: 1 | Status: SHIPPED | OUTPATIENT
Start: 2024-06-04

## 2024-06-04 RX ORDER — APIXABAN 5 MG/1
5 TABLET, FILM COATED ORAL 2 TIMES DAILY
Qty: 180 TABLET | Refills: 1 | Status: SHIPPED | OUTPATIENT
Start: 2024-06-04

## 2024-06-04 RX ORDER — BUSPIRONE HYDROCHLORIDE 10 MG/1
10 TABLET ORAL 2 TIMES DAILY
Qty: 180 TABLET | Refills: 1 | Status: SHIPPED | OUTPATIENT
Start: 2024-06-04

## 2024-06-10 RX ORDER — FUROSEMIDE 20 MG/1
20 TABLET ORAL DAILY
Qty: 90 TABLET | Refills: 0 | Status: SHIPPED | OUTPATIENT
Start: 2024-06-10

## 2024-06-22 NOTE — PATIENT INSTRUCTIONS
Coronary CTA  results reviewed, as above and discussed the patient,  has non obstructive CAD and mild TAA. Add Losartan 25 mg po daily for high BP and TAA. Will rpt CTA chest in 6 months. Advised to practice intermittent fasting for weight loss management. Advised to get a sleep study to rule out sleep apnea  Daily swimming to lose weight   Continue current mediations   Echo results reviewed, as above. Follow up with me in 6 months.
DISCHARGE

## 2024-07-01 RX ORDER — CHOLECALCIFEROL (VITAMIN D3) 1250 MCG
CAPSULE ORAL
Qty: 12 CAPSULE | Refills: 1 | Status: SHIPPED | OUTPATIENT
Start: 2024-07-01

## 2024-07-01 RX ORDER — BENZONATATE 100 MG/1
100 CAPSULE ORAL 3 TIMES DAILY PRN
COMMUNITY
End: 2024-07-01 | Stop reason: SDUPTHER

## 2024-07-01 RX ORDER — BENZONATATE 100 MG/1
100 CAPSULE ORAL 3 TIMES DAILY PRN
Qty: 60 CAPSULE | Refills: 1 | Status: SHIPPED | OUTPATIENT
Start: 2024-07-01

## 2024-07-01 RX ORDER — METOPROLOL TARTRATE 50 MG/1
50 TABLET, FILM COATED ORAL 2 TIMES DAILY
Qty: 180 TABLET | Refills: 3 | Status: SHIPPED | OUTPATIENT
Start: 2024-07-01

## 2024-07-10 ENCOUNTER — OFFICE VISIT (OUTPATIENT)
Dept: PRIMARY CARE CLINIC | Age: 59
End: 2024-07-10
Payer: COMMERCIAL

## 2024-07-10 VITALS
BODY MASS INDEX: 38.76 KG/M2 | TEMPERATURE: 97.6 F | DIASTOLIC BLOOD PRESSURE: 88 MMHG | SYSTOLIC BLOOD PRESSURE: 130 MMHG | RESPIRATION RATE: 18 BRPM | HEIGHT: 64 IN | HEART RATE: 85 BPM | OXYGEN SATURATION: 96 % | WEIGHT: 227 LBS

## 2024-07-10 DIAGNOSIS — R53.83 OTHER FATIGUE: ICD-10-CM

## 2024-07-10 DIAGNOSIS — S20.211A CONTUSION OF RIB ON RIGHT SIDE, INITIAL ENCOUNTER: ICD-10-CM

## 2024-07-10 DIAGNOSIS — R30.0 DYSURIA: Primary | ICD-10-CM

## 2024-07-10 DIAGNOSIS — C15.9 MALIGNANT NEOPLASM OF ESOPHAGUS, UNSPECIFIED LOCATION (HCC): ICD-10-CM

## 2024-07-10 LAB
BILIRUBIN, POC: ABNORMAL
BLOOD URINE, POC: ABNORMAL
CLARITY, POC: ABNORMAL
COLOR, POC: ABNORMAL
GLUCOSE URINE, POC: ABNORMAL
KETONES, POC: ABNORMAL
LEUKOCYTE EST, POC: ABNORMAL
NITRITE, POC: ABNORMAL
PH, POC: 5.5
PROTEIN, POC: 30
SPECIFIC GRAVITY, POC: >=1.03
UROBILINOGEN, POC: 0.2

## 2024-07-10 PROCEDURE — 99214 OFFICE O/P EST MOD 30 MIN: CPT | Performed by: FAMILY MEDICINE

## 2024-07-10 PROCEDURE — 81002 URINALYSIS NONAUTO W/O SCOPE: CPT | Performed by: FAMILY MEDICINE

## 2024-07-10 PROCEDURE — G8427 DOCREV CUR MEDS BY ELIG CLIN: HCPCS | Performed by: FAMILY MEDICINE

## 2024-07-10 PROCEDURE — G8417 CALC BMI ABV UP PARAM F/U: HCPCS | Performed by: FAMILY MEDICINE

## 2024-07-10 PROCEDURE — 3075F SYST BP GE 130 - 139MM HG: CPT | Performed by: FAMILY MEDICINE

## 2024-07-10 PROCEDURE — 3017F COLORECTAL CA SCREEN DOC REV: CPT | Performed by: FAMILY MEDICINE

## 2024-07-10 PROCEDURE — 1036F TOBACCO NON-USER: CPT | Performed by: FAMILY MEDICINE

## 2024-07-10 PROCEDURE — 3079F DIAST BP 80-89 MM HG: CPT | Performed by: FAMILY MEDICINE

## 2024-07-10 RX ORDER — SULFAMETHOXAZOLE AND TRIMETHOPRIM 800; 160 MG/1; MG/1
1 TABLET ORAL 2 TIMES DAILY
Qty: 20 TABLET | Refills: 0 | Status: SHIPPED | OUTPATIENT
Start: 2024-07-10 | End: 2024-07-20

## 2024-07-10 SDOH — ECONOMIC STABILITY: FOOD INSECURITY: WITHIN THE PAST 12 MONTHS, YOU WORRIED THAT YOUR FOOD WOULD RUN OUT BEFORE YOU GOT MONEY TO BUY MORE.: NEVER TRUE

## 2024-07-10 SDOH — ECONOMIC STABILITY: FOOD INSECURITY: WITHIN THE PAST 12 MONTHS, THE FOOD YOU BOUGHT JUST DIDN'T LAST AND YOU DIDN'T HAVE MONEY TO GET MORE.: NEVER TRUE

## 2024-07-10 SDOH — ECONOMIC STABILITY: INCOME INSECURITY: HOW HARD IS IT FOR YOU TO PAY FOR THE VERY BASICS LIKE FOOD, HOUSING, MEDICAL CARE, AND HEATING?: NOT HARD AT ALL

## 2024-07-10 ASSESSMENT — ENCOUNTER SYMPTOMS
COLOR CHANGE: 0
APNEA: 0
CHEST TIGHTNESS: 0
WHEEZING: 0
VOMITING: 0
SORE THROAT: 0
SHORTNESS OF BREATH: 0
PHOTOPHOBIA: 0
ALLERGIC/IMMUNOLOGIC NEGATIVE: 1
NAUSEA: 0
SINUS PRESSURE: 0
BLOOD IN STOOL: 0
BACK PAIN: 0
DIARRHEA: 0
COUGH: 0
ABDOMINAL PAIN: 0
FACIAL SWELLING: 0

## 2024-07-10 NOTE — PROGRESS NOTES
Respiratory:  Negative for apnea, cough, chest tightness, shortness of breath and wheezing.    Cardiovascular:  Negative for chest pain, palpitations and leg swelling.   Gastrointestinal:  Negative for abdominal pain, blood in stool, diarrhea, nausea and vomiting.   Genitourinary:  Positive for dysuria and frequency. Negative for difficulty urinating and urgency.   Musculoskeletal:  Negative for arthralgias, back pain, joint swelling and myalgias.   Skin:  Negative for color change and rash.   Allergic/Immunologic: Negative.    Neurological:  Negative for syncope, weakness, light-headedness and headaches.   Hematological: Negative.    Psychiatric/Behavioral:  Negative for agitation, behavioral problems, confusion and self-injury. The patient is not nervous/anxious.    All other systems reviewed and are negative.      Physical Exam  Vitals and nursing note reviewed.   Constitutional:       General: She is not in acute distress.     Appearance: She is well-developed.   HENT:      Head: Normocephalic and atraumatic.      Nose: Nose normal.   Eyes:      Conjunctiva/sclera: Conjunctivae normal.      Pupils: Pupils are equal, round, and reactive to light.   Neck:      Thyroid: No thyromegaly.      Vascular: No JVD.   Cardiovascular:      Rate and Rhythm: Normal rate and regular rhythm.      Heart sounds: Normal heart sounds. No murmur heard.     No friction rub. No gallop.   Pulmonary:      Effort: Pulmonary effort is normal. No respiratory distress.      Breath sounds: Normal breath sounds. No wheezing.   Chest:      Chest wall: Tenderness present.   Abdominal:      General: Bowel sounds are normal. There is no distension.      Palpations: Abdomen is soft.      Tenderness: There is no abdominal tenderness. There is no guarding or rebound.   Musculoskeletal:         General: Normal range of motion.      Cervical back: Normal range of motion and neck supple.   Lymphadenopathy:      Cervical: No cervical adenopathy.

## 2024-07-12 ENCOUNTER — TELEPHONE (OUTPATIENT)
Dept: PRIMARY CARE CLINIC | Age: 59
End: 2024-07-12

## 2024-07-12 RX ORDER — TIZANIDINE 4 MG/1
4 TABLET ORAL EVERY 8 HOURS PRN
Qty: 30 TABLET | Refills: 0 | Status: SHIPPED | OUTPATIENT
Start: 2024-07-12

## 2024-07-12 RX ORDER — LOSARTAN POTASSIUM 25 MG/1
25 TABLET ORAL DAILY
Qty: 90 TABLET | Refills: 3 | OUTPATIENT
Start: 2024-07-12

## 2024-07-12 NOTE — TELEPHONE ENCOUNTER
Patient calling for results, but results are not available yet. In a lot of rib pain with any breathing, moving, bending. Said anytime she moves, she's in a lot of pain. Asking if there is something you can prescribe for her, muscle relaxer or something to help with the pain.

## 2024-07-13 LAB
CULTURE: ABNORMAL
SPECIMEN DESCRIPTION: ABNORMAL

## 2024-07-24 ENCOUNTER — OFFICE VISIT (OUTPATIENT)
Dept: CARDIOLOGY CLINIC | Age: 59
End: 2024-07-24
Payer: COMMERCIAL

## 2024-07-24 VITALS
DIASTOLIC BLOOD PRESSURE: 88 MMHG | RESPIRATION RATE: 18 BRPM | BODY MASS INDEX: 40.43 KG/M2 | WEIGHT: 228.2 LBS | HEART RATE: 101 BPM | SYSTOLIC BLOOD PRESSURE: 138 MMHG | HEIGHT: 63 IN

## 2024-07-24 DIAGNOSIS — I10 PRIMARY HYPERTENSION: Primary | ICD-10-CM

## 2024-07-24 PROCEDURE — 3017F COLORECTAL CA SCREEN DOC REV: CPT | Performed by: INTERNAL MEDICINE

## 2024-07-24 PROCEDURE — 93000 ELECTROCARDIOGRAM COMPLETE: CPT | Performed by: INTERNAL MEDICINE

## 2024-07-24 PROCEDURE — 99214 OFFICE O/P EST MOD 30 MIN: CPT | Performed by: INTERNAL MEDICINE

## 2024-07-24 PROCEDURE — G8417 CALC BMI ABV UP PARAM F/U: HCPCS | Performed by: INTERNAL MEDICINE

## 2024-07-24 PROCEDURE — G8427 DOCREV CUR MEDS BY ELIG CLIN: HCPCS | Performed by: INTERNAL MEDICINE

## 2024-07-24 PROCEDURE — 3079F DIAST BP 80-89 MM HG: CPT | Performed by: INTERNAL MEDICINE

## 2024-07-24 PROCEDURE — 1036F TOBACCO NON-USER: CPT | Performed by: INTERNAL MEDICINE

## 2024-07-24 PROCEDURE — 3075F SYST BP GE 130 - 139MM HG: CPT | Performed by: INTERNAL MEDICINE

## 2024-07-24 NOTE — PROGRESS NOTES
OUTPATIENT CARDIOLOGY FOLLOW-UP    Name: Anita Dave    Age: 58 y.o.    Primary Care Physician: Washington Rivers DO    Date of Service: 7/24/2024    Chief Complaint:   Chief Complaint   Patient presents with    6 Month Follow-Up       Interim History:   Ms. Dave is a 58-year-old  female with history of morbid obesity, atrial septal aneurysm, palpitations and atypical chest pain presented to the office visit for evaluation of dyspnea with exertion and leg edema.  She was evaluated in the office by Dr. Constantino on 11/5/2021 and noted that her Holter, stress test and echo results were normal and no further cardiac work-up was recommended.  Patient was reassured and was recommended follow-up as needed.  She has been having dyspnea for the past 15 years along with some chest pain.  She told me that she recently lost her  to  colon cancer and passed away in January 2022.    She was seen in the office on 2/8/2024, since last visit, she has not had any ER visits or hospitalizations.  She is compliant with medications, as well as salt and fluid intake.  She does not take any over-the-counter arthritis medications. She was diagnosed with esophageal adenocarcinoma stage T1 and N1 in11/23.  She is done with chemo and XRT and now eosphageal cancer is in remission and she decided not to go for surgery and following with oncolgy service.  Still has dyspnea mainly with exertion,   please underwent surveillance EGD and also PET scans for any recurrence.    She had chest pain on 4/24/23, and was associated with dizziness, sweating, patient went to the emergency room and was evaluated and discharged home.  Recently, she was admitted to hospital from 3/6/23 to 3/20/23 with bilateral submassive PE.  She was initiated on anticoagulation therapy and currently taking Eliquis 5 mg p.o. twice daily without bleeding complications.  Denies smoking.  Echo in 3/23 showed an EF 60%, mildly dilated RV, trace pericardial

## 2024-07-24 NOTE — PATIENT INSTRUCTIONS
Coronary CTA  results reviewed, as above and discussed the patient,  has non obstructive CAD and mild TAA.  Will rpt  CTA chest in 6 months.   Will continue metoprolol  50 mg twice a day.  Advised to monitor BP and heart rate daily and call me in one week, if BP remains elevated, will consider adding Losartan  Follow up with oncology  service as scheduled.  Continue current mediations  including Eliquis   Echo results reviewed, as above.  Follow up with me in 6 months.

## 2024-07-26 ENCOUNTER — HOSPITAL ENCOUNTER (OUTPATIENT)
Dept: RADIATION ONCOLOGY | Age: 59
Discharge: HOME | End: 2024-07-26
Payer: COMMERCIAL

## 2024-07-26 ENCOUNTER — TELEPHONE (OUTPATIENT)
Dept: CASE MANAGEMENT | Age: 59
End: 2024-07-26

## 2024-07-26 VITALS
SYSTOLIC BLOOD PRESSURE: 142 MMHG | BODY MASS INDEX: 40.64 KG/M2 | WEIGHT: 229.4 LBS | TEMPERATURE: 98 F | RESPIRATION RATE: 20 BRPM | HEART RATE: 100 BPM | DIASTOLIC BLOOD PRESSURE: 85 MMHG

## 2024-07-26 DIAGNOSIS — C15.5 MALIGNANT NEOPLASM OF LOWER THIRD OF ESOPHAGUS (HCC): Primary | ICD-10-CM

## 2024-07-26 PROCEDURE — 99212 OFFICE O/P EST SF 10 MIN: CPT

## 2024-07-26 PROCEDURE — 99212 OFFICE O/P EST SF 10 MIN: CPT | Performed by: RADIOLOGY

## 2024-07-26 RX ORDER — TIZANIDINE 4 MG/1
4 TABLET ORAL EVERY 8 HOURS PRN
Qty: 30 TABLET | Refills: 0 | Status: SHIPPED | OUTPATIENT
Start: 2024-07-26

## 2024-07-26 NOTE — PROGRESS NOTES
Anita Dave  7/26/2024  9:22 AM      Vitals:    07/26/24 0915   BP: (!) 142/85   Pulse: 100   Resp:    Temp:     :    Wt Readings from Last 3 Encounters:   07/26/24 104.1 kg (229 lb 6.4 oz)   07/24/24 103.5 kg (228 lb 3.2 oz)   07/10/24 103 kg (227 lb)                Current Outpatient Medications:     tiZANidine (ZANAFLEX) 4 MG tablet, Take 1 tablet by mouth every 8 hours as needed (spasm), Disp: 30 tablet, Rfl: 0    Cholecalciferol (VITAMIN D3) 1.25 MG (42926 UT) CAPS, TAKE 1 CAPSULE BY MOUTH ONE TIME PER WEEK, Disp: 12 capsule, Rfl: 1    metoprolol tartrate (LOPRESSOR) 50 MG tablet, Take 1 tablet by mouth 2 times daily, Disp: 180 tablet, Rfl: 3    benzonatate (TESSALON) 100 MG capsule, Take 1 capsule by mouth 3 times daily as needed for Cough, Disp: 60 capsule, Rfl: 1    furosemide (LASIX) 20 MG tablet, TAKE 1 TABLET BY MOUTH EVERY DAY, Disp: 90 tablet, Rfl: 0    DULoxetine (CYMBALTA) 60 MG extended release capsule, TAKE 1 CAPSULE BY MOUTH TWICE A DAY, Disp: 180 capsule, Rfl: 1    ELIQUIS 5 MG TABS tablet, TAKE 1 TABLET BY MOUTH TWICE A DAY, Disp: 180 tablet, Rfl: 1    busPIRone (BUSPAR) 10 MG tablet, TAKE 1 TABLET BY MOUTH TWICE A DAY, Disp: 180 tablet, Rfl: 1    sucralfate (CARAFATE) 1 GM tablet, Take 1 tablet by mouth in the morning, at noon, and at bedtime for 7 days Crush and dissolve in medicine cup full of water and drink, Disp: 21 tablet, Rfl: 0    mupirocin (BACTROBAN) 2 % ointment, APPLY TO AFFECTED AREA 3 TIMES A DAY, Disp: 22 g, Rfl: 1    dexAMETHasone (DECADRON) 4 MG tablet, TAKE THREE TABLETS THE NIGHT BEFORE TAXOL TREAMENT, Disp: , Rfl:     ondansetron (ZOFRAN) 8 MG tablet, Take 1 tablet by mouth every 8 hours as needed for Nausea or Vomiting Monday's once nightly, Tues. & Wednesday's after chemo TID every 8 hours, then every 8 hours PRN, Disp: , Rfl:     clobetasol (TEMOVATE) 0.05 % external solution, APPLY TO SCALP EVERY DAY, Disp: , Rfl:     ammonium lactate (LAC-HYDRIN) 12 % lotion, APPLY

## 2024-07-26 NOTE — TELEPHONE ENCOUNTER
I was unable to meet with patient during her follow up appointment with Dr. Palafox. Patient completed her active treatment on 3/5/2024 for her stage IIA esophageal cancer diagnosis. Her Survivorship Care plan has been prepped and ready to be given to patient. I will mail patient her care plan along with other survivorship resources to her address that we have on file. A copy will be sent to her PCP as well. I will attach my card should the patient have any questions about the material provided to her. NN will sign off.

## 2024-07-26 NOTE — PROGRESS NOTES
DEPARTMENT OF RADIATION ONCOLOGY   Follow up visit        2024    NAME:  Anita Dave    :  1965 58 y.o. female     PCP: Washington Rivers DO    DIAGNOSIS:  1. Malignant neoplasm of lower third of esophagus (HCC)    Clinical stage IIa (cT1b, N1, M0, G3) adenocarcinoma of the distal esophagus in the patient with a recent history of thrombocytophilia.    STAGING: Cancer Staging   Malignant neoplasm of esophagus (HCC)  Staging form: Esophagus - Adenocarcinoma, AJCC 8th Edition  - Clinical stage from 2023: Stage IIA (cT1b, cN1, cM0, G3) - Signed by Stephanie Palafox MD on 2023      RECENT HISTORY: Anita Dave is now 5 months out from completion of RT to the distal esophagus.  The patient was scheduled for minimal esophagectomy in April.  She underwent restaging, in particular, she had an endoscopy with serial biopsies that did not show any malignant cells.  Being a clinically complete response, the patient declined surgery.  She will continue on strict follow-up with endoscopy every 3 months.  She also had restaging PET/CT on 2024 that shows no FDG avid neoplastic process.  She had a fall on July 10 and had an x-ray of the ribs because of pain on 2024.  It did not show any acute process.  Today the patient is feeling well, her only complaint is of gonarthrosis with difficulty walking, she uses a walker and a wheelchair to move around.    Past medical, surgical, social and family histories reviewed and updated as indicated.    ALLERGIES:  Quinolones       MEDICATIONS:   Current Outpatient Medications:     tiZANidine (ZANAFLEX) 4 MG tablet, Take 1 tablet by mouth every 8 hours as needed (spasm), Disp: 30 tablet, Rfl: 0    Cholecalciferol (VITAMIN D3) 1.25 MG (44147 UT) CAPS, TAKE 1 CAPSULE BY MOUTH ONE TIME PER WEEK, Disp: 12 capsule, Rfl: 1    metoprolol tartrate (LOPRESSOR) 50 MG tablet, Take 1 tablet by mouth 2 times daily, Disp: 180 tablet, Rfl: 3    benzonatate

## 2024-08-06 ENCOUNTER — TELEPHONE (OUTPATIENT)
Dept: PRIMARY CARE CLINIC | Age: 59
End: 2024-08-06

## 2024-08-07 ENCOUNTER — OFFICE VISIT (OUTPATIENT)
Dept: PRIMARY CARE CLINIC | Age: 59
End: 2024-08-07
Payer: COMMERCIAL

## 2024-08-07 ENCOUNTER — TELEPHONE (OUTPATIENT)
Dept: SURGERY | Age: 59
End: 2024-08-07

## 2024-08-07 VITALS
SYSTOLIC BLOOD PRESSURE: 132 MMHG | WEIGHT: 228 LBS | TEMPERATURE: 97.6 F | BODY MASS INDEX: 40.4 KG/M2 | HEART RATE: 85 BPM | RESPIRATION RATE: 19 BRPM | DIASTOLIC BLOOD PRESSURE: 80 MMHG | HEIGHT: 63 IN | OXYGEN SATURATION: 97 %

## 2024-08-07 DIAGNOSIS — R05.9 COUGH, UNSPECIFIED TYPE: Primary | ICD-10-CM

## 2024-08-07 DIAGNOSIS — I87.2 STASIS DERMATITIS: ICD-10-CM

## 2024-08-07 LAB
Lab: NORMAL
PERFORMING INSTRUMENT: NORMAL
QC PASS/FAIL: NORMAL
SARS-COV-2, POC: NORMAL

## 2024-08-07 PROCEDURE — 3079F DIAST BP 80-89 MM HG: CPT | Performed by: FAMILY MEDICINE

## 2024-08-07 PROCEDURE — 1036F TOBACCO NON-USER: CPT | Performed by: FAMILY MEDICINE

## 2024-08-07 PROCEDURE — 3075F SYST BP GE 130 - 139MM HG: CPT | Performed by: FAMILY MEDICINE

## 2024-08-07 PROCEDURE — 87426 SARSCOV CORONAVIRUS AG IA: CPT | Performed by: FAMILY MEDICINE

## 2024-08-07 PROCEDURE — 3017F COLORECTAL CA SCREEN DOC REV: CPT | Performed by: FAMILY MEDICINE

## 2024-08-07 PROCEDURE — G8427 DOCREV CUR MEDS BY ELIG CLIN: HCPCS | Performed by: FAMILY MEDICINE

## 2024-08-07 PROCEDURE — 99213 OFFICE O/P EST LOW 20 MIN: CPT | Performed by: FAMILY MEDICINE

## 2024-08-07 PROCEDURE — G8417 CALC BMI ABV UP PARAM F/U: HCPCS | Performed by: FAMILY MEDICINE

## 2024-08-07 RX ORDER — TRIAMCINOLONE ACETONIDE 1 MG/G
OINTMENT TOPICAL 2 TIMES DAILY
Qty: 80 G | Refills: 2 | Status: SHIPPED | OUTPATIENT
Start: 2024-08-07 | End: 2024-09-06

## 2024-08-07 RX ORDER — MELOXICAM 15 MG/1
15 TABLET ORAL DAILY
COMMUNITY
Start: 2024-07-15

## 2024-08-07 ASSESSMENT — ENCOUNTER SYMPTOMS
ALLERGIC/IMMUNOLOGIC NEGATIVE: 1
SINUS PRESSURE: 0
NAUSEA: 0
FACIAL SWELLING: 0
COLOR CHANGE: 0
WHEEZING: 0
CHEST TIGHTNESS: 0
PHOTOPHOBIA: 0
COUGH: 1
DIARRHEA: 0
SHORTNESS OF BREATH: 0
VOMITING: 0
ABDOMINAL PAIN: 0
BACK PAIN: 0
BLOOD IN STOOL: 0
SORE THROAT: 0
APNEA: 0

## 2024-08-07 NOTE — PROGRESS NOTES
aortic dissection.   New dilatation of the ascending aorta measuring up to 4.3 cm  Preventative measure       Social History     Socioeconomic History    Marital status:    Tobacco Use    Smoking status: Never    Smokeless tobacco: Never   Vaping Use    Vaping Use: Never used   Substance and Sexual Activity    Alcohol use: No    Drug use: No    Sexual activity: Defer     Social Determinants of Health     Financial Resource Strain: Low Risk  (7/10/2024)    Overall Financial Resource Strain (CARDIA)     Difficulty of Paying Living Expenses: Not hard at all   Food Insecurity: No Food Insecurity (7/10/2024)    Hunger Vital Sign     Worried About Running Out of Food in the Last Year: Never true     Ran Out of Food in the Last Year: Never true   Transportation Needs: Unknown (7/10/2024)    PRAPARE - Transportation     Lack of Transportation (Non-Medical): No   Housing Stability: Unknown (7/10/2024)    Housing Stability Vital Sign     Unstable Housing in the Last Year: No       Family History   Problem Relation Age of Onset    Stroke Mother     High Blood Pressure Mother     Hypertension Mother     Arthritis Mother     Heart Disease Mother     Colon Cancer Father     Breast Cancer Sister 50    Cancer Sister     High Blood Pressure Brother     Heart Disease Brother     Arthritis Brother     Breast Cancer Paternal Grandmother     Breast Cancer Maternal Cousin          Review of Systems   Constitutional: Negative.    HENT:  Negative for congestion, facial swelling, hearing loss, nosebleeds, sinus pressure and sore throat.    Eyes:  Negative for photophobia and visual disturbance.   Respiratory:  Positive for cough. Negative for apnea, chest tightness, shortness of breath and wheezing.    Cardiovascular:  Negative for chest pain, palpitations and leg swelling.   Gastrointestinal:  Negative for abdominal pain, blood in stool, diarrhea, nausea and vomiting.   Genitourinary:  Negative for difficulty urinating, frequency

## 2024-08-07 NOTE — TELEPHONE ENCOUNTER
Patient called, recently saw Dr Yepez at the [facility].  Dr Yepez suggested that she get scopes again. Called the [facility] [phone] and left msg for records dept to fax office notes or call me if they had any questions.

## 2024-08-07 NOTE — TELEPHONE ENCOUNTER
Recd Dr Yepez's notes and recommendations.  Will scan in and forward to Dr Sellers for further review.

## 2024-08-14 ENCOUNTER — TELEPHONE (OUTPATIENT)
Dept: PRIMARY CARE CLINIC | Age: 59
End: 2024-08-14

## 2024-08-14 DIAGNOSIS — E78.5 HYPERLIPIDEMIA, UNSPECIFIED HYPERLIPIDEMIA TYPE: ICD-10-CM

## 2024-08-14 DIAGNOSIS — E11.9 TYPE 2 DIABETES MELLITUS WITHOUT COMPLICATION, WITHOUT LONG-TERM CURRENT USE OF INSULIN (HCC): ICD-10-CM

## 2024-08-14 DIAGNOSIS — R05.9 COUGH, UNSPECIFIED TYPE: Primary | ICD-10-CM

## 2024-08-14 NOTE — TELEPHONE ENCOUNTER
The pt is calling to see if she can get an order for some labs and she is asking if you can include an A1C, she is also asking if you can order her a xray because she has had a cough for a long time

## 2024-08-23 ENCOUNTER — OFFICE VISIT (OUTPATIENT)
Dept: PULMONOLOGY | Age: 59
End: 2024-08-23
Payer: MEDICARE

## 2024-08-23 VITALS
RESPIRATION RATE: 16 BRPM | SYSTOLIC BLOOD PRESSURE: 123 MMHG | DIASTOLIC BLOOD PRESSURE: 86 MMHG | OXYGEN SATURATION: 98 % | HEART RATE: 88 BPM

## 2024-08-23 DIAGNOSIS — G47.33 OSA (OBSTRUCTIVE SLEEP APNEA): ICD-10-CM

## 2024-08-23 DIAGNOSIS — R09.02 HYPOXIA: ICD-10-CM

## 2024-08-23 DIAGNOSIS — I71.9 AORTIC ANEURYSM WITHOUT RUPTURE, UNSPECIFIED PORTION OF AORTA (HCC): ICD-10-CM

## 2024-08-23 DIAGNOSIS — Z86.711 HISTORY OF PULMONARY EMBOLISM: ICD-10-CM

## 2024-08-23 DIAGNOSIS — C15.9 ESOPHAGEAL CARCINOMA (HCC): ICD-10-CM

## 2024-08-23 DIAGNOSIS — R05.3 CHRONIC COUGH: Primary | ICD-10-CM

## 2024-08-23 PROCEDURE — G8417 CALC BMI ABV UP PARAM F/U: HCPCS | Performed by: INTERNAL MEDICINE

## 2024-08-23 PROCEDURE — 3079F DIAST BP 80-89 MM HG: CPT | Performed by: INTERNAL MEDICINE

## 2024-08-23 PROCEDURE — 1036F TOBACCO NON-USER: CPT | Performed by: INTERNAL MEDICINE

## 2024-08-23 PROCEDURE — G8427 DOCREV CUR MEDS BY ELIG CLIN: HCPCS | Performed by: INTERNAL MEDICINE

## 2024-08-23 PROCEDURE — 99214 OFFICE O/P EST MOD 30 MIN: CPT | Performed by: INTERNAL MEDICINE

## 2024-08-23 PROCEDURE — 3017F COLORECTAL CA SCREEN DOC REV: CPT | Performed by: INTERNAL MEDICINE

## 2024-08-23 PROCEDURE — 3074F SYST BP LT 130 MM HG: CPT | Performed by: INTERNAL MEDICINE

## 2024-08-23 NOTE — PROGRESS NOTES
A sleep Study with PAP titration was ordered and she is to follow up in three (3) months  
prior to our office visit and we also reviewed them together today.  Reviewed treatment plan and answered all questions to satisfaction..  45 Minutes of which greater than 50% was spent counseling or coordinating care.    My signature verifies the accuracy and relevance of my note, including documentation of information that has been copy pasted/forward, note templates, and Notewriter Macros.    - COVID-19 precautions  - Recommend yearly Influenza and appropriate pneumonia vaccinations.  - diet modifications and weight modifications as directed    - sleep study titration to be completed     Return in about 3 months (around 11/23/2024) for Cough .    Thank you very much for allowing me to see this patient in consultation and follow up.    Care reviewed with nursing staff, medical and surgical specialty care, primary care and the patient's family as available. Restraints are ordered when the patient can do harm to him/herself by pulling out devices.    Rafael Hdz MD, M.D.

## 2024-08-26 DIAGNOSIS — M79.89 LEG SWELLING: ICD-10-CM

## 2024-08-26 DIAGNOSIS — R06.02 SOB (SHORTNESS OF BREATH): ICD-10-CM

## 2024-08-26 RX ORDER — LOSARTAN POTASSIUM 25 MG/1
25 TABLET ORAL DAILY
Qty: 90 TABLET | Refills: 3 | OUTPATIENT
Start: 2024-08-26

## 2024-08-26 RX ORDER — BENZONATATE 100 MG/1
CAPSULE ORAL
Qty: 60 CAPSULE | Refills: 1 | Status: SHIPPED | OUTPATIENT
Start: 2024-08-26

## 2024-08-27 RX ORDER — FUROSEMIDE 20 MG
20 TABLET ORAL DAILY
Qty: 90 TABLET | Refills: 0 | Status: SHIPPED | OUTPATIENT
Start: 2024-08-27

## 2024-09-05 DIAGNOSIS — E11.9 TYPE 2 DIABETES MELLITUS WITHOUT COMPLICATION, WITHOUT LONG-TERM CURRENT USE OF INSULIN (HCC): ICD-10-CM

## 2024-09-05 DIAGNOSIS — E78.5 HYPERLIPIDEMIA, UNSPECIFIED HYPERLIPIDEMIA TYPE: ICD-10-CM

## 2024-09-05 DIAGNOSIS — R05.9 COUGH, UNSPECIFIED TYPE: ICD-10-CM

## 2024-09-05 LAB
ALBUMIN: 4.3 G/DL (ref 3.5–5.2)
ALP BLD-CCNC: 132 U/L (ref 35–104)
ALT SERPL-CCNC: 18 U/L (ref 0–32)
ANION GAP SERPL CALCULATED.3IONS-SCNC: 16 MMOL/L (ref 7–16)
AST SERPL-CCNC: 19 U/L (ref 0–31)
BASOPHILS ABSOLUTE: 0.04 K/UL (ref 0–0.2)
BASOPHILS RELATIVE PERCENT: 1 % (ref 0–2)
BILIRUB SERPL-MCNC: 0.4 MG/DL (ref 0–1.2)
BUN BLDV-MCNC: 10 MG/DL (ref 6–20)
CALCIUM SERPL-MCNC: 10 MG/DL (ref 8.6–10.2)
CHLORIDE BLD-SCNC: 106 MMOL/L (ref 98–107)
CHOLESTEROL, TOTAL: 176 MG/DL
CO2: 21 MMOL/L (ref 22–29)
CREAT SERPL-MCNC: 0.6 MG/DL (ref 0.5–1)
EOSINOPHILS ABSOLUTE: 0.14 K/UL (ref 0.05–0.5)
EOSINOPHILS RELATIVE PERCENT: 2 % (ref 0–6)
GFR, ESTIMATED: >90 ML/MIN/1.73M2
GLUCOSE BLD-MCNC: 109 MG/DL (ref 74–99)
HBA1C MFR BLD: 6.3 % (ref 4–5.6)
HCT VFR BLD CALC: 38.1 % (ref 34–48)
HDLC SERPL-MCNC: 56 MG/DL
HEMOGLOBIN: 12.1 G/DL (ref 11.5–15.5)
IMMATURE GRANULOCYTES %: 0 % (ref 0–5)
IMMATURE GRANULOCYTES ABSOLUTE: <0.03 K/UL (ref 0–0.58)
LDL CHOLESTEROL: 102 MG/DL
LYMPHOCYTES ABSOLUTE: 1.17 K/UL (ref 1.5–4)
LYMPHOCYTES RELATIVE PERCENT: 17 % (ref 20–42)
MCH RBC QN AUTO: 28.1 PG (ref 26–35)
MCHC RBC AUTO-ENTMCNC: 31.8 G/DL (ref 32–34.5)
MCV RBC AUTO: 88.4 FL (ref 80–99.9)
MONOCYTES ABSOLUTE: 0.42 K/UL (ref 0.1–0.95)
MONOCYTES RELATIVE PERCENT: 6 % (ref 2–12)
NEUTROPHILS ABSOLUTE: 5.21 K/UL (ref 1.8–7.3)
NEUTROPHILS RELATIVE PERCENT: 74 % (ref 43–80)
PDW BLD-RTO: 15.5 % (ref 11.5–15)
PLATELET # BLD: 335 K/UL (ref 130–450)
PMV BLD AUTO: 10 FL (ref 7–12)
POTASSIUM SERPL-SCNC: 4.3 MMOL/L (ref 3.5–5)
RBC # BLD: 4.31 M/UL (ref 3.5–5.5)
SODIUM BLD-SCNC: 143 MMOL/L (ref 132–146)
TOTAL PROTEIN: 7.1 G/DL (ref 6.4–8.3)
TRIGL SERPL-MCNC: 89 MG/DL
TSH SERPL DL<=0.05 MIU/L-ACNC: 1.61 UIU/ML (ref 0.27–4.2)
VLDLC SERPL CALC-MCNC: 18 MG/DL
WBC # BLD: 7 K/UL (ref 4.5–11.5)

## 2024-09-06 SDOH — HEALTH STABILITY: PHYSICAL HEALTH: ON AVERAGE, HOW MANY DAYS PER WEEK DO YOU ENGAGE IN MODERATE TO STRENUOUS EXERCISE (LIKE A BRISK WALK)?: 0 DAYS

## 2024-09-06 ASSESSMENT — PATIENT HEALTH QUESTIONNAIRE - PHQ9
SUM OF ALL RESPONSES TO PHQ QUESTIONS 1-9: 22
1. LITTLE INTEREST OR PLEASURE IN DOING THINGS: NEARLY EVERY DAY
4. FEELING TIRED OR HAVING LITTLE ENERGY: NEARLY EVERY DAY
SUM OF ALL RESPONSES TO PHQ9 QUESTIONS 1 & 2: 6
8. MOVING OR SPEAKING SO SLOWLY THAT OTHER PEOPLE COULD HAVE NOTICED. OR THE OPPOSITE, BEING SO FIGETY OR RESTLESS THAT YOU HAVE BEEN MOVING AROUND A LOT MORE THAN USUAL: NOT AT ALL
SUM OF ALL RESPONSES TO PHQ QUESTIONS 1-9: 22
10. IF YOU CHECKED OFF ANY PROBLEMS, HOW DIFFICULT HAVE THESE PROBLEMS MADE IT FOR YOU TO DO YOUR WORK, TAKE CARE OF THINGS AT HOME, OR GET ALONG WITH OTHER PEOPLE: EXTREMELY DIFFICULT
SUM OF ALL RESPONSES TO PHQ QUESTIONS 1-9: 21
2. FEELING DOWN, DEPRESSED OR HOPELESS: NEARLY EVERY DAY
9. THOUGHTS THAT YOU WOULD BE BETTER OFF DEAD, OR OF HURTING YOURSELF: SEVERAL DAYS
7. TROUBLE CONCENTRATING ON THINGS, SUCH AS READING THE NEWSPAPER OR WATCHING TELEVISION: NEARLY EVERY DAY
3. TROUBLE FALLING OR STAYING ASLEEP: NEARLY EVERY DAY
SUM OF ALL RESPONSES TO PHQ QUESTIONS 1-9: 22
6. FEELING BAD ABOUT YOURSELF - OR THAT YOU ARE A FAILURE OR HAVE LET YOURSELF OR YOUR FAMILY DOWN: NEARLY EVERY DAY
5. POOR APPETITE OR OVEREATING: NEARLY EVERY DAY

## 2024-09-06 ASSESSMENT — LIFESTYLE VARIABLES
HOW MANY STANDARD DRINKS CONTAINING ALCOHOL DO YOU HAVE ON A TYPICAL DAY: 0
HOW MANY STANDARD DRINKS CONTAINING ALCOHOL DO YOU HAVE ON A TYPICAL DAY: PATIENT DOES NOT DRINK
HOW OFTEN DO YOU HAVE SIX OR MORE DRINKS ON ONE OCCASION: 1
HOW OFTEN DO YOU HAVE A DRINK CONTAINING ALCOHOL: 1
HOW OFTEN DO YOU HAVE A DRINK CONTAINING ALCOHOL: NEVER

## 2024-09-09 ENCOUNTER — OFFICE VISIT (OUTPATIENT)
Dept: PRIMARY CARE CLINIC | Age: 59
End: 2024-09-09
Payer: MEDICARE

## 2024-09-09 VITALS
TEMPERATURE: 97.9 F | WEIGHT: 230 LBS | HEIGHT: 63 IN | DIASTOLIC BLOOD PRESSURE: 70 MMHG | SYSTOLIC BLOOD PRESSURE: 110 MMHG | OXYGEN SATURATION: 95 % | BODY MASS INDEX: 40.75 KG/M2 | HEART RATE: 70 BPM

## 2024-09-09 DIAGNOSIS — F32.A DEPRESSION, UNSPECIFIED DEPRESSION TYPE: ICD-10-CM

## 2024-09-09 DIAGNOSIS — E66.01 OBESITY, CLASS III, BMI 40-49.9 (MORBID OBESITY) (HCC): ICD-10-CM

## 2024-09-09 DIAGNOSIS — Z00.00 WELCOME TO MEDICARE PREVENTIVE VISIT: Primary | ICD-10-CM

## 2024-09-09 DIAGNOSIS — J44.9 CHRONIC OBSTRUCTIVE PULMONARY DISEASE, UNSPECIFIED COPD TYPE (HCC): ICD-10-CM

## 2024-09-09 DIAGNOSIS — E11.9 TYPE 2 DIABETES MELLITUS WITHOUT COMPLICATION, WITHOUT LONG-TERM CURRENT USE OF INSULIN (HCC): ICD-10-CM

## 2024-09-09 PROBLEM — I26.99 PULMONARY EMBOLISM, BILATERAL (HCC): Status: RESOLVED | Noted: 2023-03-16 | Resolved: 2024-09-09

## 2024-09-09 PROBLEM — I26.99 PULMONARY INFARCT (HCC): Status: RESOLVED | Noted: 2023-03-16 | Resolved: 2024-09-09

## 2024-09-09 PROBLEM — I26.99 PULMONARY EMBOLUS (HCC): Status: RESOLVED | Noted: 2023-03-17 | Resolved: 2024-09-09

## 2024-09-09 PROCEDURE — G0402 INITIAL PREVENTIVE EXAM: HCPCS | Performed by: FAMILY MEDICINE

## 2024-09-09 PROCEDURE — 3044F HG A1C LEVEL LT 7.0%: CPT | Performed by: FAMILY MEDICINE

## 2024-09-09 PROCEDURE — 3074F SYST BP LT 130 MM HG: CPT | Performed by: FAMILY MEDICINE

## 2024-09-09 PROCEDURE — 3017F COLORECTAL CA SCREEN DOC REV: CPT | Performed by: FAMILY MEDICINE

## 2024-09-09 PROCEDURE — 3078F DIAST BP <80 MM HG: CPT | Performed by: FAMILY MEDICINE

## 2024-09-10 RX ORDER — DULOXETIN HYDROCHLORIDE 60 MG/1
CAPSULE, DELAYED RELEASE ORAL DAILY
Qty: 90 CAPSULE | Refills: 3 | Status: SHIPPED | OUTPATIENT
Start: 2024-09-10

## 2024-09-12 ENCOUNTER — OFFICE VISIT (OUTPATIENT)
Dept: SURGERY | Age: 59
End: 2024-09-12
Payer: MEDICARE

## 2024-09-12 VITALS
SYSTOLIC BLOOD PRESSURE: 130 MMHG | WEIGHT: 230 LBS | TEMPERATURE: 97.2 F | HEIGHT: 63 IN | DIASTOLIC BLOOD PRESSURE: 85 MMHG | BODY MASS INDEX: 40.75 KG/M2 | HEART RATE: 82 BPM | OXYGEN SATURATION: 97 %

## 2024-09-12 DIAGNOSIS — Z80.0 FAMILY HISTORY OF COLON CANCER: ICD-10-CM

## 2024-09-12 DIAGNOSIS — R19.5 LOOSE STOOLS: ICD-10-CM

## 2024-09-12 DIAGNOSIS — C16.0 PRIMARY MALIGNANT NEOPLASM OF ESOPHAGOGASTRIC JUNCTION (HCC): Primary | ICD-10-CM

## 2024-09-12 PROCEDURE — 99214 OFFICE O/P EST MOD 30 MIN: CPT | Performed by: SURGERY

## 2024-09-12 PROCEDURE — 1036F TOBACCO NON-USER: CPT | Performed by: SURGERY

## 2024-09-12 PROCEDURE — G8417 CALC BMI ABV UP PARAM F/U: HCPCS | Performed by: SURGERY

## 2024-09-12 PROCEDURE — 3079F DIAST BP 80-89 MM HG: CPT | Performed by: SURGERY

## 2024-09-12 PROCEDURE — G8427 DOCREV CUR MEDS BY ELIG CLIN: HCPCS | Performed by: SURGERY

## 2024-09-12 PROCEDURE — 3017F COLORECTAL CA SCREEN DOC REV: CPT | Performed by: SURGERY

## 2024-09-12 PROCEDURE — 3075F SYST BP GE 130 - 139MM HG: CPT | Performed by: SURGERY

## 2024-09-13 RX ORDER — ATORVASTATIN CALCIUM 40 MG/1
TABLET, FILM COATED ORAL
Qty: 90 TABLET | Refills: 3 | Status: SHIPPED | OUTPATIENT
Start: 2024-09-13

## 2024-09-16 ENCOUNTER — CLINICAL DOCUMENTATION (OUTPATIENT)
Dept: SURGERY | Age: 59
End: 2024-09-16

## 2024-09-16 ENCOUNTER — OFFICE VISIT (OUTPATIENT)
Dept: FAMILY MEDICINE CLINIC | Age: 59
End: 2024-09-16
Payer: MEDICARE

## 2024-09-16 VITALS
WEIGHT: 229 LBS | SYSTOLIC BLOOD PRESSURE: 134 MMHG | HEIGHT: 63 IN | DIASTOLIC BLOOD PRESSURE: 84 MMHG | TEMPERATURE: 97 F | OXYGEN SATURATION: 98 % | HEART RATE: 78 BPM | BODY MASS INDEX: 40.57 KG/M2

## 2024-09-16 DIAGNOSIS — L23.9 ALLERGIC DERMATITIS: Primary | ICD-10-CM

## 2024-09-16 DIAGNOSIS — R30.0 DYSURIA: ICD-10-CM

## 2024-09-16 PROCEDURE — 1036F TOBACCO NON-USER: CPT | Performed by: FAMILY MEDICINE

## 2024-09-16 PROCEDURE — G8417 CALC BMI ABV UP PARAM F/U: HCPCS | Performed by: FAMILY MEDICINE

## 2024-09-16 PROCEDURE — G8427 DOCREV CUR MEDS BY ELIG CLIN: HCPCS | Performed by: FAMILY MEDICINE

## 2024-09-16 PROCEDURE — 3017F COLORECTAL CA SCREEN DOC REV: CPT | Performed by: FAMILY MEDICINE

## 2024-09-16 PROCEDURE — 3079F DIAST BP 80-89 MM HG: CPT | Performed by: FAMILY MEDICINE

## 2024-09-16 PROCEDURE — 99213 OFFICE O/P EST LOW 20 MIN: CPT | Performed by: FAMILY MEDICINE

## 2024-09-16 PROCEDURE — 3075F SYST BP GE 130 - 139MM HG: CPT | Performed by: FAMILY MEDICINE

## 2024-09-16 RX ORDER — NITROFURANTOIN 25; 75 MG/1; MG/1
CAPSULE ORAL
Qty: 20 CAPSULE | Refills: 0 | Status: SHIPPED | OUTPATIENT
Start: 2024-09-16

## 2024-09-16 RX ORDER — METHYLPREDNISOLONE 4 MG
TABLET, DOSE PACK ORAL
Qty: 1 KIT | Refills: 0 | Status: SHIPPED | OUTPATIENT
Start: 2024-09-16

## 2024-09-16 RX ORDER — SULFAMETHOXAZOLE/TRIMETHOPRIM 800-160 MG
1 TABLET ORAL 2 TIMES DAILY
Qty: 20 TABLET | Refills: 0 | Status: SHIPPED | OUTPATIENT
Start: 2024-09-16 | End: 2024-09-26

## 2024-09-16 RX ORDER — TRIAMCINOLONE ACETONIDE 0.25 MG/G
OINTMENT TOPICAL
Qty: 80 G | Refills: 1 | Status: SHIPPED | OUTPATIENT
Start: 2024-09-16

## 2024-09-16 ASSESSMENT — ENCOUNTER SYMPTOMS
FACIAL SWELLING: 0
CHEST TIGHTNESS: 0
VOMITING: 0
ABDOMINAL PAIN: 0
SHORTNESS OF BREATH: 0
URTICARIA: 1
WHEEZING: 0
DIARRHEA: 0
BLOOD IN STOOL: 0
SINUS PRESSURE: 0
SORE THROAT: 0
COUGH: 0
BACK PAIN: 0
NAUSEA: 0
ALLERGIC/IMMUNOLOGIC NEGATIVE: 1
COLOR CHANGE: 0
PHOTOPHOBIA: 0
APNEA: 0

## 2024-09-20 ENCOUNTER — TELEPHONE (OUTPATIENT)
Dept: PRIMARY CARE CLINIC | Age: 59
End: 2024-09-20

## 2024-09-20 RX ORDER — PREDNISONE 20 MG/1
TABLET ORAL
Qty: 11 TABLET | Refills: 0 | Status: SHIPPED | OUTPATIENT
Start: 2024-09-20

## 2024-10-02 ENCOUNTER — PREP FOR PROCEDURE (OUTPATIENT)
Dept: SURGERY | Age: 59
End: 2024-10-02

## 2024-10-02 DIAGNOSIS — C16.0 PRIMARY MALIGNANT NEOPLASM OF ESOPHAGOGASTRIC JUNCTION (HCC): ICD-10-CM

## 2024-10-02 DIAGNOSIS — Z80.0 FH: COLON CANCER: ICD-10-CM

## 2024-10-04 ENCOUNTER — TELEPHONE (OUTPATIENT)
Dept: SURGERY | Age: 59
End: 2024-10-04

## 2024-10-04 NOTE — TELEPHONE ENCOUNTER
Patient called and states that she only wants EGD done with out colonoscopy due to chemo treatments.

## 2024-10-14 ENCOUNTER — OFFICE VISIT (OUTPATIENT)
Dept: FAMILY MEDICINE CLINIC | Age: 59
End: 2024-10-14
Payer: MEDICARE

## 2024-10-14 VITALS
OXYGEN SATURATION: 97 % | TEMPERATURE: 98.6 F | WEIGHT: 230 LBS | SYSTOLIC BLOOD PRESSURE: 134 MMHG | DIASTOLIC BLOOD PRESSURE: 86 MMHG | HEART RATE: 69 BPM | BODY MASS INDEX: 40.75 KG/M2

## 2024-10-14 DIAGNOSIS — H11.32 SUBCONJUNCTIVAL HEMORRHAGE OF LEFT EYE: Primary | ICD-10-CM

## 2024-10-14 PROCEDURE — 99213 OFFICE O/P EST LOW 20 MIN: CPT | Performed by: PHYSICIAN ASSISTANT

## 2024-10-14 PROCEDURE — G8427 DOCREV CUR MEDS BY ELIG CLIN: HCPCS | Performed by: PHYSICIAN ASSISTANT

## 2024-10-14 PROCEDURE — 3017F COLORECTAL CA SCREEN DOC REV: CPT | Performed by: PHYSICIAN ASSISTANT

## 2024-10-14 PROCEDURE — 3079F DIAST BP 80-89 MM HG: CPT | Performed by: PHYSICIAN ASSISTANT

## 2024-10-14 PROCEDURE — G8484 FLU IMMUNIZE NO ADMIN: HCPCS | Performed by: PHYSICIAN ASSISTANT

## 2024-10-14 PROCEDURE — 1036F TOBACCO NON-USER: CPT | Performed by: PHYSICIAN ASSISTANT

## 2024-10-14 PROCEDURE — 3075F SYST BP GE 130 - 139MM HG: CPT | Performed by: PHYSICIAN ASSISTANT

## 2024-10-14 PROCEDURE — G8417 CALC BMI ABV UP PARAM F/U: HCPCS | Performed by: PHYSICIAN ASSISTANT

## 2024-10-14 RX ORDER — ERYTHROMYCIN 5 MG/G
OINTMENT OPHTHALMIC
Qty: 3.5 G | Refills: 0 | Status: SHIPPED | OUTPATIENT
Start: 2024-10-14

## 2024-10-14 NOTE — PROGRESS NOTES
HOURS AS NEEDED, Disp: , Rfl:     diclofenac sodium (VOLTAREN) 1 % GEL, APPLY TO AFFECTED AREA THREE TIMES PER DAY, Disp: , Rfl:     sucralfate (CARAFATE) 1 GM tablet, Take 1 tablet by mouth in the morning, at noon, and at bedtime for 7 days Crush and dissolve in medicine cup full of water and drink, Disp: 21 tablet, Rfl: 0    dexAMETHasone (DECADRON) 4 MG tablet, TAKE THREE TABLETS THE NIGHT BEFORE TAXOL TREAMENT (Patient not taking: Reported on 10/14/2024), Disp: , Rfl:     Allergies:     Allergies   Allergen Reactions    Quinolones Other (See Comments)     This appears to be a new Dilatation of the ascending aorta measuring up to 4.3 cm  - will defer further floroquinonlones???  Previous CTA in 2019-- Aorta: Normal. No aortic aneurysm. No aortic dissection.   New dilatation of the ascending aorta measuring up to 4.3 cm  Preventative measure       Social History:     Social History     Tobacco Use    Smoking status: Never    Smokeless tobacco: Never   Vaping Use    Vaping status: Never Used   Substance Use Topics    Alcohol use: No    Drug use: No       Patient lives at home.    Physical Exam:     Vitals:    10/14/24 1307   BP: 134/86   Pulse: 69   Temp: 98.6 °F (37 °C)   TempSrc: Temporal   SpO2: 97%   Weight: 104.3 kg (230 lb)       Exam:  Physical Exam  Nurses note and vital signs reviewed and patient is not hypoxic.    General: The patient appears well and in no apparent distress. Patient is resting comfortably on cart.  Skin: Warm, dry, no pallor noted. There is no rash noted.  Head: Normocephalic, atraumatic.  Eye: Normal conjunctiva on the right, the left eye does show evidence of the subconjunctival hemorrhage to the lateral and inferior portion of the conjunctiva, PERRLA, EOMI, no pain with extraocular eye motion  Ears, Nose, Mouth, and Throat: Oral mucosa is moist  Cardiovascular: Regular Rate and Rhythm  Respiratory: Patient is in no distress, no accessory muscle use, lungs are clear to auscultation, no

## 2024-10-15 ENCOUNTER — TELEPHONE (OUTPATIENT)
Dept: SURGERY | Age: 59
End: 2024-10-15

## 2024-10-15 NOTE — TELEPHONE ENCOUNTER
Prior Authorization Form:      DEMOGRAPHICS:                     Patient Name:  Melony Peacock  Patient :  1965            Insurance:  Payor: MEDICARE / Plan: MEDICARE PART A AND B / Product Type: *No Product type* /   Insurance ID Number:    Payer/Plan Subscr  Sex Relation Sub. Ins. ID Effective Group Num   1. MEDICARE - ME* MELONY PEACOCK 1965 Female Self [id number]                                    [address]          DIAGNOSIS & PROCEDURE:                       Procedure/Operation: Colonoscopy           CPT Code: 98264    Diagnosis:  Family history of colon cancer    ICD10 Code: Z80.0    Location:  [facility]    Surgeon:  Dr Sellers    SCHEDULING INFORMATION:                          Date: 24    Time: 1:30 pm              Anesthesia:  LMAC                                                       Status:  Outpatient        Special Comments:         Electronically signed by Caitlyn Fink MA on 10/15/2024 at 6:02 PM

## 2024-10-15 NOTE — TELEPHONE ENCOUNTER
Anita Dave is scheduled for colonoscopy with Dr Sellers on 11-08-24 at [facility]. Patient needs to be NPO after midnight the night before procedure. All surgery instructions were explained to the patient and a surgery letter was also mailed out. MA informed patient that PAT will also be calling to review pre-op instructions and medications. Patient verbalized understanding.  Electronically signed by Caitlyn Fink MA on 10/15/2024 at 6:01 PM

## 2024-10-15 NOTE — TELEPHONE ENCOUNTER
Anita Dave is scheduled for EGD with Dr Sellers on 10-23-24 at [facility]. Patient needs to be NPO after midnight the night before procedure. All surgery instructions were explained to the patient and a surgery letter was also mailed out. MA informed patient that PAT will also be calling to review pre-op instructions and medications. Patient verbalized understanding.  Electronically signed by Caitlyn Fink MA on 10/15/2024 at 5:58 PM

## 2024-10-22 NOTE — TELEPHONE ENCOUNTER
Don't restart the medication.    See your dentist tomorrow and get x-rays.    Stay off the medication for now.    We can follow-up about this at your physical in a month.       Pt had an appt with Dr Mary Rajput this am as a new pt, and said she did not make it to the appt, due to a flat tire. She has been rescheduled to 8/2/22. Please reschedule the appt. Accordingly.   thanks

## 2024-10-31 ENCOUNTER — CLINICAL DOCUMENTATION (OUTPATIENT)
Dept: SURGERY | Age: 59
End: 2024-10-31

## 2024-10-31 NOTE — PROGRESS NOTES
MA added EGD back to schedule on 11-08-24 with her colonoscopy.    Electronically signed by Caitlyn Fink MA on 10/31/2024 at 9:46 AM

## 2024-11-05 ENCOUNTER — OFFICE VISIT (OUTPATIENT)
Dept: PRIMARY CARE CLINIC | Age: 59
End: 2024-11-05
Payer: MEDICARE

## 2024-11-05 VITALS
DIASTOLIC BLOOD PRESSURE: 78 MMHG | TEMPERATURE: 97.8 F | OXYGEN SATURATION: 95 % | SYSTOLIC BLOOD PRESSURE: 128 MMHG | WEIGHT: 228 LBS | BODY MASS INDEX: 38.93 KG/M2 | RESPIRATION RATE: 18 BRPM | HEART RATE: 99 BPM | HEIGHT: 64 IN

## 2024-11-05 DIAGNOSIS — R30.0 DYSURIA: ICD-10-CM

## 2024-11-05 DIAGNOSIS — R35.0 FREQUENT URINATION: Primary | ICD-10-CM

## 2024-11-05 LAB
APPEARANCE FLUID: CLEAR
BILIRUBIN, POC: NEGATIVE
BLOOD URINE, POC: NORMAL
CLARITY, POC: CLEAR
COLOR, POC: YELLOW
GLUCOSE URINE, POC: NEGATIVE MG/DL
KETONES, POC: NEGATIVE MG/DL
LEUKOCYTE EST, POC: NORMAL
NITRITE, POC: NEGATIVE
PH, POC: 6
PROTEIN, POC: NORMAL MG/DL
SPECIFIC GRAVITY, POC: 1.03
UROBILINOGEN, POC: 0.2 MG/DL

## 2024-11-05 PROCEDURE — G8427 DOCREV CUR MEDS BY ELIG CLIN: HCPCS | Performed by: FAMILY MEDICINE

## 2024-11-05 PROCEDURE — G8417 CALC BMI ABV UP PARAM F/U: HCPCS | Performed by: FAMILY MEDICINE

## 2024-11-05 PROCEDURE — 99213 OFFICE O/P EST LOW 20 MIN: CPT | Performed by: FAMILY MEDICINE

## 2024-11-05 PROCEDURE — G8484 FLU IMMUNIZE NO ADMIN: HCPCS | Performed by: FAMILY MEDICINE

## 2024-11-05 PROCEDURE — 3074F SYST BP LT 130 MM HG: CPT | Performed by: FAMILY MEDICINE

## 2024-11-05 PROCEDURE — 3078F DIAST BP <80 MM HG: CPT | Performed by: FAMILY MEDICINE

## 2024-11-05 PROCEDURE — 1036F TOBACCO NON-USER: CPT | Performed by: FAMILY MEDICINE

## 2024-11-05 PROCEDURE — 3017F COLORECTAL CA SCREEN DOC REV: CPT | Performed by: FAMILY MEDICINE

## 2024-11-05 PROCEDURE — 81002 URINALYSIS NONAUTO W/O SCOPE: CPT | Performed by: FAMILY MEDICINE

## 2024-11-05 RX ORDER — CEFDINIR 300 MG/1
300 CAPSULE ORAL 2 TIMES DAILY
Qty: 20 CAPSULE | Refills: 0 | Status: SHIPPED | OUTPATIENT
Start: 2024-11-05 | End: 2024-11-15

## 2024-11-05 ASSESSMENT — ENCOUNTER SYMPTOMS
COUGH: 0
ABDOMINAL PAIN: 0
BACK PAIN: 0
SORE THROAT: 0
VOMITING: 0
ALLERGIC/IMMUNOLOGIC NEGATIVE: 1
SHORTNESS OF BREATH: 0
APNEA: 0
CHEST TIGHTNESS: 0
DIARRHEA: 0
PHOTOPHOBIA: 0
FACIAL SWELLING: 0
COLOR CHANGE: 0
NAUSEA: 0
WHEEZING: 0
SINUS PRESSURE: 0
BLOOD IN STOOL: 0

## 2024-11-05 NOTE — PROGRESS NOTES
Chief Complaint:   Chief Complaint   Patient presents with    Urinary Frequency     Started few days ago.     Dysuria       Dysuria   This is a new problem. The current episode started in the past 7 days. The problem occurs every urination. The problem has been gradually worsening. The quality of the pain is described as burning. The pain is at a severity of 2/10. The pain is mild. She is Not sexually active. There is No history of pyelonephritis. Pertinent negatives include no frequency, nausea, urgency or vomiting. She has tried increased fluids for the symptoms.       Patient Active Problem List   Diagnosis    Palpitations    Atypical chest pain    Atrial septal aneurysm    Type 2 diabetes mellitus without complication, without long-term current use of insulin (HCC)    Hyperlipidemia    Essential hypertension    Acute electrocardiogram changes    Lactic acidosis    Right heart enlargement    S/P admn tPA in diff fac w/n last 24 hr bef adm to crnt fac    Anemia    Asymptomatic bacteriuria    Primary hypertension    Aneurysm of ascending aorta without rupture (HCC)    Abdominal pain, epigastric    Malignant neoplasm of esophagus (HCC)    Anxiety    Cancer of esophagus (HCC)    Localized swelling of both lower legs    Ecchymosis    Chronic obstructive pulmonary disease, unspecified    Primary malignant neoplasm of esophagogastric junction (HCC)    FH: colon cancer       Past Medical History:   Diagnosis Date    Aorta disorder (HCC)     ascending aortic dilation 4.3 cm echo 10/6/23 per Vascular note [facility] 10/20/23 in Epic & Dr. Cuellar    Atrial septal aneurysm     Dr. Cuellar following    Cancer of esophagus (HCC) 11/13/2023    chemo & radiation complete    Gall bladder disease     Herniated disc     Hyperlipidemia     Hypertension     Infectious lymphangitis of deep lymphatics     Kidney stone     h/o    CHRISTIAN (obstructive sleep apnea)     not yet using cpap    PE (pulmonary thromboembolism) ([facility]) 03/2023    . On Eliquis

## 2024-11-06 ENCOUNTER — TELEPHONE (OUTPATIENT)
Dept: SURGERY | Age: 59
End: 2024-11-06

## 2024-11-06 NOTE — TELEPHONE ENCOUNTER
Spoke to patient regarding egd/colon sched 11/8/24.  Is cancelling due to death in family and UTI.  Office will r/s.  Patient does not want colonoscopy at this time, just EGD.

## 2024-11-11 ENCOUNTER — TELEPHONE (OUTPATIENT)
Dept: PRIMARY CARE CLINIC | Age: 59
End: 2024-11-11

## 2024-11-12 ENCOUNTER — PREP FOR PROCEDURE (OUTPATIENT)
Dept: SURGERY | Age: 59
End: 2024-11-12

## 2024-11-12 ENCOUNTER — TELEPHONE (OUTPATIENT)
Dept: SURGERY | Age: 59
End: 2024-11-12

## 2024-11-12 DIAGNOSIS — C16.0 PRIMARY ADENOCARCINOMA OF ESOPHAGOGASTRIC JUNCTION (HCC): ICD-10-CM

## 2024-11-12 NOTE — TELEPHONE ENCOUNTER
D/w dr quick - he can not scope her before her appt  If they have a cancellation they willl call her  
Parvez d/w dr quick  
Pt can't get in for endoscopy until January with Dr Sellers.  Pt having a lot of pain, trouble swallowing, almost choking on food. Is there anything you can do to get this done sooner? Maybe speak to Verito? (Per pt)  
Pt notified   
Pt notified, please let her know after discussing with him.   
Demonstrate a substantial reduction in both hyperactive pacing on the unit and social intrusiveness

## 2024-11-12 NOTE — TELEPHONE ENCOUNTER
Prior Authorization Form:      DEMOGRAPHICS:                     Patient Name:  Melony Peacock  Patient :  1965            Insurance:  Payor: MEDICARE / Plan: MEDICARE PART A AND B / Product Type: *No Product type* /   Insurance ID Number:    Payer/Plan Subscr  Sex Relation Sub. Ins. ID Effective Group Num   1. MEDICARE - ME* MELONY PEACOCK 1965 Female Self [id number]                                    [address]          DIAGNOSIS & PROCEDURE:                       Procedure/Operation: EGD           CPT Code: 78907    Diagnosis:  Primary malignant neoplasm of esophagogastric junction    ICD10 Code: C16.0    Location:  [facility]    Surgeon:  Dr Sellers    SCHEDULING INFORMATION:                          Date: 25    Time: 11:30 am              Anesthesia:  LMAC                                                       Status:  Outpatient        Special Comments:         Electronically signed by Caitlyn Fink MA on 2024 at 8:40 AM

## 2024-11-12 NOTE — TELEPHONE ENCOUNTER
Anita Dave is scheduled for EGD with Dr Sellers on 01-02-25 at [facility]. Patient needs to be NPO after midnight the night before procedure. All surgery instructions were explained to the patient and a surgery letter was also mailed out. MA informed patient that PAT will also be calling to review pre-op instructions and medications. Patient verbalized understanding.  Electronically signed by Caitlyn Fink MA on 11/12/2024 at 8:39 AM

## 2024-11-22 ENCOUNTER — OFFICE VISIT (OUTPATIENT)
Dept: FAMILY MEDICINE CLINIC | Age: 59
End: 2024-11-22
Payer: MEDICARE

## 2024-11-22 VITALS
DIASTOLIC BLOOD PRESSURE: 78 MMHG | HEIGHT: 64 IN | WEIGHT: 226 LBS | TEMPERATURE: 97.6 F | OXYGEN SATURATION: 97 % | BODY MASS INDEX: 38.58 KG/M2 | SYSTOLIC BLOOD PRESSURE: 116 MMHG | HEART RATE: 94 BPM

## 2024-11-22 DIAGNOSIS — R30.0 DYSURIA: Primary | ICD-10-CM

## 2024-11-22 DIAGNOSIS — R19.7 DIARRHEA OF PRESUMED INFECTIOUS ORIGIN: ICD-10-CM

## 2024-11-22 LAB
BILIRUBIN, POC: ABNORMAL
BLOOD URINE, POC: ABNORMAL
CLARITY, POC: CLEAR
COLOR, POC: YELLOW
GLUCOSE URINE, POC: ABNORMAL MG/DL
KETONES, POC: ABNORMAL MG/DL
LEUKOCYTE EST, POC: ABNORMAL
NITRITE, POC: POSITIVE
PH, POC: 5.5
PROTEIN, POC: ABNORMAL MG/DL
SPECIFIC GRAVITY, POC: >=1.03
UROBILINOGEN, POC: 0.2 MG/DL

## 2024-11-22 PROCEDURE — G8427 DOCREV CUR MEDS BY ELIG CLIN: HCPCS | Performed by: FAMILY MEDICINE

## 2024-11-22 PROCEDURE — 3078F DIAST BP <80 MM HG: CPT | Performed by: FAMILY MEDICINE

## 2024-11-22 PROCEDURE — 1036F TOBACCO NON-USER: CPT | Performed by: FAMILY MEDICINE

## 2024-11-22 PROCEDURE — 99214 OFFICE O/P EST MOD 30 MIN: CPT | Performed by: FAMILY MEDICINE

## 2024-11-22 PROCEDURE — 3017F COLORECTAL CA SCREEN DOC REV: CPT | Performed by: FAMILY MEDICINE

## 2024-11-22 PROCEDURE — G8417 CALC BMI ABV UP PARAM F/U: HCPCS | Performed by: FAMILY MEDICINE

## 2024-11-22 PROCEDURE — G8484 FLU IMMUNIZE NO ADMIN: HCPCS | Performed by: FAMILY MEDICINE

## 2024-11-22 PROCEDURE — 81002 URINALYSIS NONAUTO W/O SCOPE: CPT | Performed by: FAMILY MEDICINE

## 2024-11-22 PROCEDURE — 3074F SYST BP LT 130 MM HG: CPT | Performed by: FAMILY MEDICINE

## 2024-11-22 RX ORDER — NITROFURANTOIN 25; 75 MG/1; MG/1
100 CAPSULE ORAL 2 TIMES DAILY
Qty: 20 CAPSULE | Refills: 0 | Status: SHIPPED | OUTPATIENT
Start: 2024-11-22 | End: 2024-12-02

## 2024-11-22 ASSESSMENT — ENCOUNTER SYMPTOMS
WHEEZING: 0
BLOOD IN STOOL: 0
PHOTOPHOBIA: 0
APNEA: 0
NAUSEA: 0
CHEST TIGHTNESS: 0
ALLERGIC/IMMUNOLOGIC NEGATIVE: 1
COUGH: 0
ABDOMINAL PAIN: 1
BACK PAIN: 0
DIARRHEA: 1
COLOR CHANGE: 0
SORE THROAT: 0
FACIAL SWELLING: 0
SHORTNESS OF BREATH: 0
VOMITING: 0
SINUS PRESSURE: 0

## 2024-11-22 NOTE — PROGRESS NOTES
Chief Complaint:   Chief Complaint   Patient presents with    Dysuria     Last UTI x1-2 weeks ago, states has frequent UTIs    Diarrhea    Abdominal Pain     Tender/pain all over whole abdomen       Dysuria   This is a new problem. The current episode started 1 to 4 weeks ago. The problem occurs intermittently. The problem has been gradually worsening. The quality of the pain is described as burning. The pain is at a severity of 5/10. Pertinent negatives include no frequency, nausea, urgency or vomiting.   Diarrhea   This is a new problem. The current episode started in the past 7 days. The problem has been unchanged. Associated symptoms include abdominal pain. Pertinent negatives include no arthralgias, coughing, headaches, myalgias or vomiting. She has tried nothing for the symptoms.   Abdominal Pain  This is a recurrent problem. The current episode started more than 1 month ago. The onset quality is sudden. The problem occurs every several days. The pain is located in the generalized abdominal region. The pain is at a severity of 1/10. The pain is mild. The quality of the pain is cramping. Associated symptoms include diarrhea and dysuria. Pertinent negatives include no arthralgias, frequency, headaches, myalgias, nausea or vomiting.       Patient Active Problem List   Diagnosis    Palpitations    Atypical chest pain    Atrial septal aneurysm    Type 2 diabetes mellitus without complication, without long-term current use of insulin (HCC)    Hyperlipidemia    Essential hypertension    Acute electrocardiogram changes    Lactic acidosis    Right heart enlargement    S/P admn tPA in diff fac w/n last 24 hr bef adm to crnt fac    Anemia    Asymptomatic bacteriuria    Primary hypertension    Aneurysm of ascending aorta without rupture (HCC)    Abdominal pain, epigastric    Malignant neoplasm of esophagus (HCC)    Anxiety    Cancer of esophagus (HCC)    Localized swelling of both lower legs    Ecchymosis    Chronic

## 2024-11-23 LAB
C DIFF AG + TOXIN: NEGATIVE
SPECIMEN DESCRIPTION: NORMAL

## 2024-11-24 LAB
CULTURE: ABNORMAL
SPECIMEN DESCRIPTION: ABNORMAL

## 2024-11-27 DIAGNOSIS — F41.9 ANXIETY: ICD-10-CM

## 2024-11-27 RX ORDER — BUSPIRONE HYDROCHLORIDE 10 MG/1
10 TABLET ORAL 2 TIMES DAILY
Qty: 180 TABLET | Refills: 1 | Status: SHIPPED | OUTPATIENT
Start: 2024-11-27

## 2024-12-01 DIAGNOSIS — R06.02 SOB (SHORTNESS OF BREATH): ICD-10-CM

## 2024-12-01 DIAGNOSIS — M79.89 LEG SWELLING: ICD-10-CM

## 2024-12-02 RX ORDER — FUROSEMIDE 20 MG/1
20 TABLET ORAL DAILY
Qty: 30 TABLET | Refills: 2 | Status: SHIPPED | OUTPATIENT
Start: 2024-12-02

## 2024-12-03 ENCOUNTER — OFFICE VISIT (OUTPATIENT)
Dept: PRIMARY CARE CLINIC | Age: 59
End: 2024-12-03
Payer: MEDICARE

## 2024-12-03 VITALS
BODY MASS INDEX: 38.93 KG/M2 | RESPIRATION RATE: 16 BRPM | SYSTOLIC BLOOD PRESSURE: 136 MMHG | DIASTOLIC BLOOD PRESSURE: 74 MMHG | HEIGHT: 64 IN | WEIGHT: 228 LBS | OXYGEN SATURATION: 97 % | HEART RATE: 83 BPM

## 2024-12-03 DIAGNOSIS — R19.7 DIARRHEA, UNSPECIFIED TYPE: Primary | ICD-10-CM

## 2024-12-03 DIAGNOSIS — R30.0 DYSURIA: ICD-10-CM

## 2024-12-03 PROCEDURE — G8427 DOCREV CUR MEDS BY ELIG CLIN: HCPCS | Performed by: FAMILY MEDICINE

## 2024-12-03 PROCEDURE — G8417 CALC BMI ABV UP PARAM F/U: HCPCS | Performed by: FAMILY MEDICINE

## 2024-12-03 PROCEDURE — 3017F COLORECTAL CA SCREEN DOC REV: CPT | Performed by: FAMILY MEDICINE

## 2024-12-03 PROCEDURE — G8484 FLU IMMUNIZE NO ADMIN: HCPCS | Performed by: FAMILY MEDICINE

## 2024-12-03 PROCEDURE — 1036F TOBACCO NON-USER: CPT | Performed by: FAMILY MEDICINE

## 2024-12-03 PROCEDURE — 3078F DIAST BP <80 MM HG: CPT | Performed by: FAMILY MEDICINE

## 2024-12-03 PROCEDURE — 99213 OFFICE O/P EST LOW 20 MIN: CPT | Performed by: FAMILY MEDICINE

## 2024-12-03 PROCEDURE — 3075F SYST BP GE 130 - 139MM HG: CPT | Performed by: FAMILY MEDICINE

## 2024-12-03 RX ORDER — MELOXICAM 15 MG/1
15 TABLET ORAL DAILY
Qty: 30 TABLET | Refills: 11 | Status: SHIPPED | OUTPATIENT
Start: 2024-12-03

## 2024-12-03 RX ORDER — SULFAMETHOXAZOLE AND TRIMETHOPRIM 800; 160 MG/1; MG/1
1 TABLET ORAL 2 TIMES DAILY
Qty: 20 TABLET | Refills: 0 | Status: SHIPPED | OUTPATIENT
Start: 2024-12-03 | End: 2024-12-13

## 2024-12-03 ASSESSMENT — ENCOUNTER SYMPTOMS
SHORTNESS OF BREATH: 0
COUGH: 0
ALLERGIC/IMMUNOLOGIC NEGATIVE: 1
DIARRHEA: 1
BACK PAIN: 0
SINUS PRESSURE: 0
SORE THROAT: 0
PHOTOPHOBIA: 0
BLOOD IN STOOL: 0
NAUSEA: 0
ABDOMINAL PAIN: 0
CHEST TIGHTNESS: 0
VOMITING: 0
WHEEZING: 0
COLOR CHANGE: 0
FACIAL SWELLING: 0
APNEA: 0

## 2024-12-03 NOTE — PROGRESS NOTES
Chief Complaint:   Chief Complaint   Patient presents with    Follow-up       Diarrhea   This is a recurrent problem. The current episode started in the past 7 days. The problem occurs less than 2 times per day. The problem has been waxing and waning. The stool consistency is described as Watery. The patient states that diarrhea does not awaken her from sleep. Pertinent negatives include no abdominal pain, arthralgias, coughing, headaches, myalgias or vomiting.   Urinary Tract Infection  This is a new problem. The current episode started in the past 7 days. The problem has been gradually worsening since onset.       Patient Active Problem List   Diagnosis    Palpitations    Atypical chest pain    Atrial septal aneurysm    Type 2 diabetes mellitus without complication, without long-term current use of insulin (HCC)    Hyperlipidemia    Essential hypertension    Acute electrocardiogram changes    Lactic acidosis    Right heart enlargement    S/P admn tPA in diff fac w/n last 24 hr bef adm to crnt fac    Anemia    Asymptomatic bacteriuria    Primary hypertension    Aneurysm of ascending aorta without rupture (HCC)    Abdominal pain, epigastric    Malignant neoplasm of esophagus (HCC)    Anxiety    Cancer of esophagus (HCC)    Localized swelling of both lower legs    Ecchymosis    Chronic obstructive pulmonary disease, unspecified    Primary malignant neoplasm of esophagogastric junction (HCC)    FH: colon cancer    Primary adenocarcinoma of esophagogastric junction (HCC)       Past Medical History:   Diagnosis Date    Aorta disorder (HCC)     ascending aortic dilation 4.3 cm echo 10/6/23 per Vascular note [facility] 10/20/23 in Epic & Dr. Cuellar    Atrial septal aneurysm     Dr. Cuellar following    Cancer of esophagus (HCC) 11/13/2023    chemo & radiation complete    Gall bladder disease     Herniated disc     Hyperlipidemia     Hypertension     Infectious lymphangitis of deep lymphatics     Kidney stone     h/o    CHRISTIAN

## 2024-12-06 NOTE — TELEPHONE ENCOUNTER
Name of Medication(s) Requested:  Requested Prescriptions     Pending Prescriptions Disp Refills    tiZANidine (ZANAFLEX) 4 MG tablet 30 tablet 1     Sig: Take 1 tablet by mouth every 8 hours as needed (spasm)       Medication is on current medication list Yes    Dosage and directions were verified? Yes    Quantity verified: 30 day supply     Pharmacy Verified?  Yes    Last Appointment:  12/3/2024    Future appts:  Future Appointments   Date Time Provider Department Center   1/3/2025  9:15 AM Washington Rivers DO [facility]   1/16/2025  3:45 PM Justin Sellers MD [facility]   1/27/2025  9:00 AM Stephanie Palafox MD [facility]        (If no appt send self scheduling link. .REFILLAPPT)  Scheduling request sent?     [] Yes  [x] No    Does patient need updated?  [] Yes  [x] No

## 2024-12-13 RX ORDER — METHOCARBAMOL 750 MG/1
TABLET ORAL
Qty: 12 CAPSULE | Refills: 1 | Status: SHIPPED | OUTPATIENT
Start: 2024-12-13

## 2024-12-23 ENCOUNTER — TELEPHONE (OUTPATIENT)
Dept: SURGERY | Age: 59
End: 2024-12-23

## 2024-12-23 ENCOUNTER — PREP FOR PROCEDURE (OUTPATIENT)
Dept: SURGERY | Age: 59
End: 2024-12-23

## 2024-12-23 NOTE — TELEPHONE ENCOUNTER
Anita ROBERT Dave is scheduled for colonoscopy (MA, again, called patient to see if she wanted to do colonoscopy on the date of her EGD, 01-02-25 and patient said no) with Dr Sellers on 04-15-25 at [facility]. Patient needs to be NPO after midnight the night before procedure. All surgery instructions were explained to the patient and a surgery letter was also mailed out. MA informed patient that PAT will also be calling to review pre-op instructions and medications. Patient verbalized understanding.  Electronically signed by Caitlyn Fink MA on 12/23/2024 at 7:47 AM

## 2024-12-30 ENCOUNTER — OFFICE VISIT (OUTPATIENT)
Dept: FAMILY MEDICINE CLINIC | Age: 59
End: 2024-12-30
Payer: MEDICARE

## 2024-12-30 VITALS
DIASTOLIC BLOOD PRESSURE: 84 MMHG | OXYGEN SATURATION: 98 % | SYSTOLIC BLOOD PRESSURE: 126 MMHG | BODY MASS INDEX: 38.79 KG/M2 | RESPIRATION RATE: 18 BRPM | TEMPERATURE: 97.5 F | HEART RATE: 74 BPM | WEIGHT: 226 LBS

## 2024-12-30 DIAGNOSIS — J06.9 URI WITH COUGH AND CONGESTION: ICD-10-CM

## 2024-12-30 DIAGNOSIS — N39.0 URINARY TRACT INFECTION WITHOUT HEMATURIA, SITE UNSPECIFIED: Primary | ICD-10-CM

## 2024-12-30 DIAGNOSIS — R39.9 UTI SYMPTOMS: ICD-10-CM

## 2024-12-30 LAB
BILIRUBIN, POC: NORMAL
BLOOD URINE, POC: NORMAL
CLARITY, POC: CLEAR
COLOR, POC: YELLOW
GLUCOSE URINE, POC: NORMAL MG/DL
KETONES, POC: NORMAL MG/DL
LEUKOCYTE EST, POC: NORMAL
NITRITE, POC: NORMAL
PH, POC: 5.5
PROTEIN, POC: NORMAL MG/DL
SPECIFIC GRAVITY, POC: 1.02
UROBILINOGEN, POC: 0.2 MG/DL

## 2024-12-30 PROCEDURE — 3074F SYST BP LT 130 MM HG: CPT

## 2024-12-30 PROCEDURE — 3079F DIAST BP 80-89 MM HG: CPT

## 2024-12-30 PROCEDURE — 81002 URINALYSIS NONAUTO W/O SCOPE: CPT

## 2024-12-30 PROCEDURE — 3017F COLORECTAL CA SCREEN DOC REV: CPT

## 2024-12-30 PROCEDURE — 99214 OFFICE O/P EST MOD 30 MIN: CPT

## 2024-12-30 PROCEDURE — G8417 CALC BMI ABV UP PARAM F/U: HCPCS

## 2024-12-30 PROCEDURE — 1036F TOBACCO NON-USER: CPT

## 2024-12-30 PROCEDURE — G8427 DOCREV CUR MEDS BY ELIG CLIN: HCPCS

## 2024-12-30 PROCEDURE — G8484 FLU IMMUNIZE NO ADMIN: HCPCS

## 2024-12-30 RX ORDER — CEFDINIR 300 MG/1
300 CAPSULE ORAL EVERY 12 HOURS
Qty: 14 CAPSULE | Refills: 0 | Status: SHIPPED | OUTPATIENT
Start: 2024-12-30 | End: 2025-01-06

## 2024-12-30 NOTE — PROGRESS NOTES
Chief Complaint       Urinary Frequency, Urinary Pain, Nasal Congestion, Cough, and Congestion    History of Present Illness   Source of history provided by:  patient.        History of Present Illness  The patient is a 59-year-old female presenting for evaluation of upper respiratory infection (URI) symptoms and urinary tract infection (UTI) symptoms.    She has been experiencing urinary symptoms for the past few days, characterized by frequent urination, dysuria, and lower abdominal pain. She also reports bilateral lower back pain but has not observed any hematuria. She has a history of recurrent UTIs and is under the care of a urologist.     Her respiratory symptoms, including cough and postnasal drip, have been present for the past couple of days. She also reports a constant runny nose, which has worsened over the past few days. She has not experienced any fevers, although she acknowledges the need for a new thermometer as her current one is broken.      All other ROS negative unless otherwise stated in HPI.      ROS    Unless otherwise stated in this report or unable to obtain because of the patient's clinical or mental status as evidenced by the medical record, this patients's positive and negative responses for Review of Systems, constitutional, psych, eyes, ENT, cardiovascular, respiratory, gastrointestinal, neurological, genitourinary, musculoskeletal, integument systems and systems related to the presenting problem are either stated in the preceding or were not pertinent or were negative for the symptoms and/or complaints related to the medical problem.      Physical Exam         VS:  /84   Pulse 74   Temp 97.5 °F (36.4 °C) (Temporal)   Resp 18   Wt 102.5 kg (226 lb)   LMP 10/02/2019   SpO2 98%   BMI 38.79 kg/m²    Oxygen Saturation Interpretation: Normal.    Constitutional:  Alert, development consistent with age.  Ears:  External Ears: Bilateral pinna normal. TMs translucent without

## 2024-12-31 LAB
CULTURE: NORMAL
CULTURE: NORMAL
SPECIMEN DESCRIPTION: NORMAL

## 2025-01-03 ENCOUNTER — PREP FOR PROCEDURE (OUTPATIENT)
Dept: SURGERY | Age: 60
End: 2025-01-03

## 2025-01-03 ENCOUNTER — TELEPHONE (OUTPATIENT)
Dept: SURGERY | Age: 60
End: 2025-01-03

## 2025-01-03 NOTE — TELEPHONE ENCOUNTER
Anita Dave is scheduled for EGD with Dr Sellers on 01-29-25 at [facility]. Patient needs to be NPO after midnight the night before procedure. All surgery instructions were explained to the patient and a surgery letter was also mailed out. MA informed patient that PAT will also be calling to review pre-op instructions and medications. Patient verbalized understanding.  Electronically signed by Caitlyn Fink MA on 1/3/2025 at 7:31 AM

## 2025-01-27 ENCOUNTER — OFFICE VISIT (OUTPATIENT)
Dept: FAMILY MEDICINE CLINIC | Age: 60
End: 2025-01-27
Payer: MEDICARE

## 2025-01-27 ENCOUNTER — TELEPHONE (OUTPATIENT)
Dept: SURGERY | Age: 60
End: 2025-01-27

## 2025-01-27 VITALS
WEIGHT: 221.2 LBS | TEMPERATURE: 97.6 F | BODY MASS INDEX: 37.97 KG/M2 | SYSTOLIC BLOOD PRESSURE: 124 MMHG | DIASTOLIC BLOOD PRESSURE: 76 MMHG | HEART RATE: 60 BPM | OXYGEN SATURATION: 96 %

## 2025-01-27 DIAGNOSIS — J06.9 ACUTE UPPER RESPIRATORY INFECTION, UNSPECIFIED: ICD-10-CM

## 2025-01-27 DIAGNOSIS — E11.9 TYPE 2 DIABETES MELLITUS WITHOUT COMPLICATION, WITHOUT LONG-TERM CURRENT USE OF INSULIN (HCC): ICD-10-CM

## 2025-01-27 DIAGNOSIS — R31.9 URINARY TRACT INFECTION WITH HEMATURIA, SITE UNSPECIFIED: Primary | ICD-10-CM

## 2025-01-27 DIAGNOSIS — N39.0 URINARY TRACT INFECTION WITH HEMATURIA, SITE UNSPECIFIED: Primary | ICD-10-CM

## 2025-01-27 DIAGNOSIS — J44.9 CHRONIC OBSTRUCTIVE PULMONARY DISEASE, UNSPECIFIED COPD TYPE (HCC): ICD-10-CM

## 2025-01-27 DIAGNOSIS — R30.9 URINARY PAIN: ICD-10-CM

## 2025-01-27 DIAGNOSIS — R05.9 COUGH, UNSPECIFIED TYPE: ICD-10-CM

## 2025-01-27 LAB
BILIRUBIN, POC: NEGATIVE
BLOOD URINE, POC: NEGATIVE
CLARITY, POC: NORMAL
COLOR, POC: NORMAL
GLUCOSE URINE, POC: NEGATIVE MG/DL
INFLUENZA A ANTIBODY: NEGATIVE
INFLUENZA B ANTIBODY: NEGATIVE
KETONES, POC: NEGATIVE MG/DL
LEUKOCYTE EST, POC: NORMAL
Lab: NORMAL
NITRITE, POC: POSITIVE
PERFORMING INSTRUMENT: NORMAL
PH, POC: 5.5
PROTEIN, POC: NORMAL MG/DL
QC PASS/FAIL: NORMAL
S PYO AG THROAT QL: NORMAL
SARS-COV-2, POC: NORMAL
SPECIFIC GRAVITY, POC: >=1.03
UROBILINOGEN, POC: 0.2 MG/DL

## 2025-01-27 PROCEDURE — 87804 INFLUENZA ASSAY W/OPTIC: CPT | Performed by: PHYSICIAN ASSISTANT

## 2025-01-27 PROCEDURE — 81002 URINALYSIS NONAUTO W/O SCOPE: CPT | Performed by: PHYSICIAN ASSISTANT

## 2025-01-27 PROCEDURE — 87426 SARSCOV CORONAVIRUS AG IA: CPT | Performed by: PHYSICIAN ASSISTANT

## 2025-01-27 PROCEDURE — 87880 STREP A ASSAY W/OPTIC: CPT | Performed by: PHYSICIAN ASSISTANT

## 2025-01-27 PROCEDURE — 3078F DIAST BP <80 MM HG: CPT | Performed by: PHYSICIAN ASSISTANT

## 2025-01-27 PROCEDURE — 3074F SYST BP LT 130 MM HG: CPT | Performed by: PHYSICIAN ASSISTANT

## 2025-01-27 PROCEDURE — 99214 OFFICE O/P EST MOD 30 MIN: CPT | Performed by: PHYSICIAN ASSISTANT

## 2025-01-27 PROCEDURE — 3028F O2 SATURATION DOC REV: CPT | Performed by: PHYSICIAN ASSISTANT

## 2025-01-27 RX ORDER — CEFDINIR 300 MG/1
300 CAPSULE ORAL 2 TIMES DAILY
Qty: 20 CAPSULE | Refills: 0 | Status: SHIPPED | OUTPATIENT
Start: 2025-01-27 | End: 2025-02-06

## 2025-01-27 NOTE — TELEPHONE ENCOUNTER
Patient called and stated that she went to urgent care with UTI symptoms, cough and chest congestion.  MA cancelled EGD for 01-29-25.  Patient stated that she just saw Dr Yepez who informed patient that she needed a EUS.  MA informed patient she would get back to her once Dr Sellers speaks with Dr Yepez.  Electronically signed by Caitlyn Fink MA on 1/27/2025 at 2:39 PM

## 2025-01-27 NOTE — PROGRESS NOTES
assure the accuracy ofthis document, it is possible that grammatical, syntax, or spelling errors may occur.     **This report was transcribed using voice recognition software. The patient (or guardian, if applicable) and other individuals in attendance with the patient were advised that if Artificial Intelligence would be utilized during this visit to record and process the conversation to generate a clinical note they would be informed prior to start of the visit. The patient (or guardian, if applicable) and other individuals in attendance at the appointment consented to the use of AI if was utilized, including the recording.  Every effort was made to ensure accuracy; however, inadvertent computerized transcription errors may be present.

## 2025-01-29 LAB
CULTURE: ABNORMAL
SPECIMEN DESCRIPTION: ABNORMAL

## 2025-01-29 NOTE — RESULT ENCOUNTER NOTE
Urinalysis does show evidence of a UTI.  It is sensitive to the antibiotic.  Please have the patient continue with the medication as prescribed

## 2025-01-30 ENCOUNTER — PREP FOR PROCEDURE (OUTPATIENT)
Age: 60
End: 2025-01-30

## 2025-01-30 ENCOUNTER — TELEPHONE (OUTPATIENT)
Age: 60
End: 2025-01-30

## 2025-01-30 NOTE — PROGRESS NOTES
[facility]   PRE-ADMISSION TESTING GENERAL INSTRUCTIONS  PAT Phone Number: [phone]      GENERAL INSTRUCTIONS:    [x] Antibacterial Soap Shower Night before AND the Morning of procedure.  [] The Night Before Surgery: Take an antibacterial soap shower - followed by CHG Wipes.   -The Morning of Surgery: Repeat CHG Wipes.  [x] Do not wear makeup, lotions, powders, deodorant the morning of surgery.  [x] No solid food after midnight. You may have SIPS of clear liquids up until 2 hours before your arrival time to the hospital.   [x] You may brush your teeth, gargle, but do not swallow water.   [x] No tobacco products, illegal drugs, or alcohol within 24 hours of your surgery.  [x] Jewelry or valuables should not be brought to the hospital. All body and/or tongue piercing's must be removed prior to arriving to hospital. No contact lens or hair pins.   [x] Arrange transportation with a responsible adult  to and from the hospital. Arrange for someone to be with you for the remainder of the day and for 24 hours after your procedure due to having had anesthesia.          -Who will be your  for transportation? Family member        -Who will be staying with you for 24 hrs after your procedure? Family  [x] Bring insurance card and photo ID.  [] Bring copy of living will or healthcare power of  papers to be placed in your electronic record.  [] Urine Pregnancy test will be preformed the day of surgery. A specimen sample may be brought from home.  [] Transfusion (Green) Bracelet: Please bring with you to hospital, day of surgery.     PARKING INSTRUCTIONS:     [x] ARRIVAL DATE & TIME: 2/4/25 at 0830  [x] Times are subject to change. We will contact you the business day before surgery if that were to occur.  [x] Enter into the [facility] Entrance. Two people may accompany you. Masks are not required.  [x] Parking Lot \"I\" is where you will park. It is located on the corner of

## 2025-01-30 NOTE — TELEPHONE ENCOUNTER
Left message for patient regarding her egd eus that dr bacon will do on 2-4-25 at [facility].   Arrival is at 9:30. Hold eliquis for 3 days prior.  No answer at dr quick office at this time to cancel procedure with him per dr bacon. Electronically signed by SCOTT DAVID LPN on 1/30/2025 at 11:50 AM

## 2025-02-03 ENCOUNTER — TELEPHONE (OUTPATIENT)
Dept: PRIMARY CARE CLINIC | Age: 60
End: 2025-02-03

## 2025-02-03 ENCOUNTER — ANESTHESIA EVENT (OUTPATIENT)
Dept: ENDOSCOPY | Age: 60
End: 2025-02-03
Payer: MEDICARE

## 2025-02-03 ENCOUNTER — HOSPITAL ENCOUNTER (OUTPATIENT)
Dept: RADIATION ONCOLOGY | Age: 60
Discharge: HOME | End: 2025-02-03
Payer: MEDICARE

## 2025-02-03 VITALS
WEIGHT: 221.4 LBS | BODY MASS INDEX: 38 KG/M2 | DIASTOLIC BLOOD PRESSURE: 68 MMHG | RESPIRATION RATE: 20 BRPM | SYSTOLIC BLOOD PRESSURE: 113 MMHG | TEMPERATURE: 98.3 F | HEART RATE: 61 BPM

## 2025-02-03 DIAGNOSIS — C15.5 MALIGNANT NEOPLASM OF LOWER THIRD OF ESOPHAGUS (HCC): Primary | ICD-10-CM

## 2025-02-03 PROCEDURE — 99212 OFFICE O/P EST SF 10 MIN: CPT

## 2025-02-03 PROCEDURE — 99212 OFFICE O/P EST SF 10 MIN: CPT | Performed by: RADIOLOGY

## 2025-02-03 ASSESSMENT — PAIN DESCRIPTION - LOCATION: LOCATION: ABDOMEN

## 2025-02-03 ASSESSMENT — PAIN SCALES - GENERAL: PAINLEVEL_OUTOF10: 5

## 2025-02-03 ASSESSMENT — PAIN DESCRIPTION - DESCRIPTORS: DESCRIPTORS: ACHING

## 2025-02-03 ASSESSMENT — PAIN DESCRIPTION - PAIN TYPE: TYPE: ACUTE PAIN

## 2025-02-03 ASSESSMENT — PAIN DESCRIPTION - FREQUENCY: FREQUENCY: CONTINUOUS

## 2025-02-03 NOTE — PROGRESS NOTES
DEPARTMENT OF RADIATION ONCOLOGY   Follow up visit        2/3/2025    NAME:  Anita Dave    :  1965 59 y.o. female     PCP: Washington Rivers DO    DIAGNOSIS:  1. Malignant neoplasm of lower third of esophagus (HCC)    Clinical stage IIa (cT1b, N1, M0, G3) adenocarcinoma of the distal esophagus in the patient with a recent history of thrombocytophilia.    STAGING: Cancer Staging   Malignant neoplasm of esophagus (HCC)  Staging form: Esophagus - Adenocarcinoma, AJCC 8th Edition  - Clinical stage from 2023: Stage IIA (cT1b, cN1, cM0, G3) - Signed by Stephanie Palafox MD on 2023      RECENT HISTORY: Anita Dave is now 12 lymph months out from completion of RT to the distal esophagus.  The patient preferred not to undergo minimal esophagectomy upon completion of the concurrent chemoradiation, she underwent strict follow-up, she presents now with 2 new lymph nodes 1 in the retroperitoneal area at the other 1 in the retrocrural area.  They are both positive at PET/CT.  The patient will undergo EUS tomorrow with possible biopsy of the retrocrural lymph node.  She is not symptomatic, she has lost about 8 pounds since last time I saw her by diet restriction.  She refers of no pain, good appetite, normal bowel movements.    Past medical, surgical, social and family histories reviewed and updated as indicated.    ALLERGIES:  Quinolones       MEDICATIONS:   Current Outpatient Medications:     tiZANidine (ZANAFLEX) 4 MG tablet, Take 1 tablet by mouth every 8 hours as needed (spasm), Disp: 30 tablet, Rfl: 1    cefdinir (OMNICEF) 300 MG capsule, Take 1 capsule by mouth 2 times daily for 10 days, Disp: 20 capsule, Rfl: 0    D3-50 1.25 MG (86876 UT) CAPS, TAKE 1 CAPSULE BY MOUTH ONE TIME PER WEEK, Disp: 12 capsule, Rfl: 1    meloxicam (MOBIC) 15 MG tablet, TAKE 1 TABLET BY MOUTH EVERY DAY, Disp: 30 tablet, Rfl: 11    furosemide (LASIX) 20 MG tablet, TAKE 1 TABLET BY MOUTH EVERY DAY, Disp: 30 
respiratory drainage, yellowish to greenish color and she state she already contacted her PCP Dr Rivers and she was told that if she doesn't get better in 3 days, he heed to see her in the office.  She follows with Dr Yepez medical oncology, her last office visit was 01/27/25.  MRI of the abdomen and pelvis showed :  No focal abnormality is seen within the left hepatic lobe correspond with the lesion described on prior CT.  No suspicious hepatic mass.  Enlarged portacaval node, similar to prior study.    12/21/24  PET scan showed:  FDG avid 1.3 cm short axis portacaval lymph node with SUV max 7.3, new from prior.  Also a new 0.9 cm right retrocrural lymph node with SUV max 3.8.  She is scheduled for EUS tomorrow 02/04/25 with Dr Klein to evaluate the adenopathy.  Per Dr Yepez's notes, check MMR/MSI status, if confirm recurrent disease she will proceed with FOLFOX with Trastuzumab and see if she need to add Immunotherapy.  She will he Anita back after her EUS.  No other complaints, Dr Palafox updated.  We will see her back in 2 months for follow up.      FALLS RISK SCREENING ASSESSMENT    Instructions:  Assess the patient and enter the appropriate indicators that are present for fall risk identification.   Total the numbers entered and assign a fall risk score from Table 2.  Reassess patient at a minimum every 12 weeks or with status change.    Assessment   Date  2/3/2025     1.  Mental Ability: confusion/cognitively impaired No - 0       2.  Elimination Issues: incontinence, frequency Yes - 3/ frequency R/T UTI she is currently on Antibiotics       3.  Ambulatory: use of assistive devices (walker, cane, off-loading devices), attached to equipment (IV pole, oxygen) No - 0     4.  Sensory Limitations: dizziness, vertigo, impaired vision No - 0       5.  Age Less than 65 years - 0       6.  Medication: diuretics, strong analgesics, hypnotics, sedatives, antihypertensive agents   Yes - 3   7.  Falls:  recent

## 2025-02-03 NOTE — TELEPHONE ENCOUNTER
Patient is on omnicef for uti she also has congestion, coughing up mucus asking for your recommendation.

## 2025-02-04 ENCOUNTER — ANESTHESIA (OUTPATIENT)
Dept: ENDOSCOPY | Age: 60
End: 2025-02-04
Payer: MEDICARE

## 2025-02-04 ENCOUNTER — HOSPITAL ENCOUNTER (OUTPATIENT)
Age: 60
Setting detail: OUTPATIENT SURGERY
Discharge: HOME | End: 2025-02-04
Attending: STUDENT IN AN ORGANIZED HEALTH CARE EDUCATION/TRAINING PROGRAM | Admitting: STUDENT IN AN ORGANIZED HEALTH CARE EDUCATION/TRAINING PROGRAM
Payer: MEDICARE

## 2025-02-04 VITALS
DIASTOLIC BLOOD PRESSURE: 89 MMHG | HEIGHT: 64 IN | SYSTOLIC BLOOD PRESSURE: 126 MMHG | OXYGEN SATURATION: 96 % | TEMPERATURE: 97.1 F | BODY MASS INDEX: 37.73 KG/M2 | WEIGHT: 221 LBS | HEART RATE: 70 BPM | RESPIRATION RATE: 17 BRPM

## 2025-02-04 DIAGNOSIS — C15.9 MALIGNANT NEOPLASM OF ESOPHAGUS, UNSPECIFIED LOCATION (HCC): ICD-10-CM

## 2025-02-04 PROCEDURE — 2720000010 HC SURG SUPPLY STERILE: Performed by: STUDENT IN AN ORGANIZED HEALTH CARE EDUCATION/TRAINING PROGRAM

## 2025-02-04 PROCEDURE — 3700000001 HC ADD 15 MINUTES (ANESTHESIA): Performed by: STUDENT IN AN ORGANIZED HEALTH CARE EDUCATION/TRAINING PROGRAM

## 2025-02-04 PROCEDURE — 3700000000 HC ANESTHESIA ATTENDED CARE: Performed by: STUDENT IN AN ORGANIZED HEALTH CARE EDUCATION/TRAINING PROGRAM

## 2025-02-04 PROCEDURE — 3609020800 HC EGD W/EUS FNA: Performed by: STUDENT IN AN ORGANIZED HEALTH CARE EDUCATION/TRAINING PROGRAM

## 2025-02-04 PROCEDURE — 7100000011 HC PHASE II RECOVERY - ADDTL 15 MIN: Performed by: STUDENT IN AN ORGANIZED HEALTH CARE EDUCATION/TRAINING PROGRAM

## 2025-02-04 PROCEDURE — 88341 IMHCHEM/IMCYTCHM EA ADD ANTB: CPT

## 2025-02-04 PROCEDURE — 7100000010 HC PHASE II RECOVERY - FIRST 15 MIN: Performed by: STUDENT IN AN ORGANIZED HEALTH CARE EDUCATION/TRAINING PROGRAM

## 2025-02-04 PROCEDURE — 2709999900 HC NON-CHARGEABLE SUPPLY: Performed by: STUDENT IN AN ORGANIZED HEALTH CARE EDUCATION/TRAINING PROGRAM

## 2025-02-04 PROCEDURE — 6360000002 HC RX W HCPCS

## 2025-02-04 PROCEDURE — 3609012400 HC EGD TRANSORAL BIOPSY SINGLE/MULTIPLE: Performed by: STUDENT IN AN ORGANIZED HEALTH CARE EDUCATION/TRAINING PROGRAM

## 2025-02-04 PROCEDURE — 2580000003 HC RX 258

## 2025-02-04 PROCEDURE — 88342 IMHCHEM/IMCYTCHM 1ST ANTB: CPT

## 2025-02-04 PROCEDURE — C1753 CATH, INTRAVAS ULTRASOUND: HCPCS | Performed by: STUDENT IN AN ORGANIZED HEALTH CARE EDUCATION/TRAINING PROGRAM

## 2025-02-04 PROCEDURE — 88305 TISSUE EXAM BY PATHOLOGIST: CPT

## 2025-02-04 PROCEDURE — 88173 CYTOPATH EVAL FNA REPORT: CPT

## 2025-02-04 RX ORDER — ONDANSETRON 2 MG/ML
4 INJECTION INTRAMUSCULAR; INTRAVENOUS EVERY 6 HOURS PRN
Status: CANCELLED | OUTPATIENT
Start: 2025-02-04

## 2025-02-04 RX ORDER — SODIUM CHLORIDE 0.9 % (FLUSH) 0.9 %
5-40 SYRINGE (ML) INJECTION PRN
Status: CANCELLED | OUTPATIENT
Start: 2025-02-04

## 2025-02-04 RX ORDER — ONDANSETRON 4 MG/1
4 TABLET, ORALLY DISINTEGRATING ORAL EVERY 8 HOURS PRN
Status: CANCELLED | OUTPATIENT
Start: 2025-02-04

## 2025-02-04 RX ORDER — SODIUM CHLORIDE 0.9 % (FLUSH) 0.9 %
5-40 SYRINGE (ML) INJECTION EVERY 12 HOURS SCHEDULED
Status: CANCELLED | OUTPATIENT
Start: 2025-02-04

## 2025-02-04 RX ORDER — PROPOFOL 10 MG/ML
INJECTION, EMULSION INTRAVENOUS
Status: DISCONTINUED | OUTPATIENT
Start: 2025-02-04 | End: 2025-02-04 | Stop reason: SDUPTHER

## 2025-02-04 RX ORDER — SODIUM CHLORIDE 9 MG/ML
INJECTION, SOLUTION INTRAVENOUS
Status: DISCONTINUED | OUTPATIENT
Start: 2025-02-04 | End: 2025-02-04 | Stop reason: SDUPTHER

## 2025-02-04 RX ORDER — SODIUM CHLORIDE 9 MG/ML
INJECTION, SOLUTION INTRAVENOUS PRN
Status: CANCELLED | OUTPATIENT
Start: 2025-02-04

## 2025-02-04 RX ORDER — OMEPRAZOLE 40 MG/1
40 CAPSULE, DELAYED RELEASE ORAL
Qty: 60 CAPSULE | Refills: 11 | Status: SHIPPED | OUTPATIENT
Start: 2025-02-04

## 2025-02-04 RX ORDER — GLYCOPYRROLATE 0.2 MG/ML
INJECTION INTRAMUSCULAR; INTRAVENOUS
Status: DISCONTINUED | OUTPATIENT
Start: 2025-02-04 | End: 2025-02-04 | Stop reason: SDUPTHER

## 2025-02-04 RX ORDER — LIDOCAINE HYDROCHLORIDE 20 MG/ML
INJECTION, SOLUTION INTRAVENOUS
Status: DISCONTINUED | OUTPATIENT
Start: 2025-02-04 | End: 2025-02-04 | Stop reason: SDUPTHER

## 2025-02-04 RX ADMIN — LIDOCAINE HYDROCHLORIDE 100 MG: 20 INJECTION, SOLUTION INTRAVENOUS at 11:57

## 2025-02-04 RX ADMIN — PROPOFOL 175 MCG/KG/MIN: 10 INJECTION, EMULSION INTRAVENOUS at 12:05

## 2025-02-04 RX ADMIN — SODIUM CHLORIDE: 9 INJECTION, SOLUTION INTRAVENOUS at 11:40

## 2025-02-04 RX ADMIN — PROPOFOL 150 MCG/KG/MIN: 10 INJECTION, EMULSION INTRAVENOUS at 11:57

## 2025-02-04 RX ADMIN — PROPOFOL 50 MG: 10 INJECTION, EMULSION INTRAVENOUS at 12:03

## 2025-02-04 RX ADMIN — PROPOFOL 40 MG: 10 INJECTION, EMULSION INTRAVENOUS at 12:00

## 2025-02-04 RX ADMIN — PROPOFOL 30 MG: 10 INJECTION, EMULSION INTRAVENOUS at 11:57

## 2025-02-04 RX ADMIN — GLYCOPYRROLATE 0.2 MG: 0.2 INJECTION INTRAMUSCULAR; INTRAVENOUS at 12:32

## 2025-02-04 ASSESSMENT — PAIN - FUNCTIONAL ASSESSMENT
PAIN_FUNCTIONAL_ASSESSMENT: NONE - DENIES PAIN
PAIN_FUNCTIONAL_ASSESSMENT: 0-10

## 2025-02-04 ASSESSMENT — LIFESTYLE VARIABLES: SMOKING_STATUS: 0

## 2025-02-04 ASSESSMENT — PAIN SCALES - GENERAL: PAINLEVEL_OUTOF10: 0

## 2025-02-04 NOTE — DISCHARGE INSTRUCTIONS
Recommendations:  -The patient will be observed post procedure until all discharge criteria are met.    -Patient has a contact number available for emergencies.  The signs and symptoms of potential delayed complications were discussed with the patient.    -Return to normal activities tomorrow.    -Written discharge instructions were provided to the patient.    -Resume anticoagulation tomorrow if applicable.   -Await pathology/cytology results.  -Clinic appointment to be scheduled.

## 2025-02-04 NOTE — PROGRESS NOTES
Pt arrived to [facility] with a small dog. Pt states the dog is a service animal trained to alert for seizures. Pt has no identification for the dog and that it was forgotten at home.

## 2025-02-04 NOTE — ANESTHESIA PRE PROCEDURE
09/05/2024 09:25 AM    RBC 4.31 09/05/2024 09:25 AM    HGB 12.1 09/05/2024 09:25 AM    HCT 38.1 09/05/2024 09:25 AM    MCV 88.4 09/05/2024 09:25 AM    RDW 15.5 09/05/2024 09:25 AM     09/05/2024 09:25 AM       CMP:   Lab Results   Component Value Date/Time     09/05/2024 09:25 AM    K 4.3 09/05/2024 09:25 AM    K 3.8 04/24/2023 12:07 PM     09/05/2024 09:25 AM    CO2 21 09/05/2024 09:25 AM    BUN 10 09/05/2024 09:25 AM    CREATININE 0.6 09/05/2024 09:25 AM    GFRAA >60 02/25/2022 10:53 AM    LABGLOM >90 09/05/2024 09:25 AM    LABGLOM >60 03/23/2024 12:50 PM    GLUCOSE 109 09/05/2024 09:25 AM    GLUCOSE 92 10/11/2011 02:25 PM    CALCIUM 10.0 09/05/2024 09:25 AM    BILITOT 0.4 09/05/2024 09:25 AM    ALKPHOS 132 09/05/2024 09:25 AM    AST 19 09/05/2024 09:25 AM    ALT 18 09/05/2024 09:25 AM       POC Tests: No results for input(s): \"POCGLU\", \"POCNA\", \"POCK\", \"POCCL\", \"POCBUN\", \"POCHEMO\", \"POCHCT\" in the last 72 hours.    Coags:   Lab Results   Component Value Date/Time    PROTIME 13.9 03/17/2023 04:00 AM    INR 1.2 03/17/2023 04:00 AM    APTT 40.8 03/20/2023 05:22 AM       HCG (If Applicable):   Lab Results   Component Value Date    PREGTESTUR Negative 10/29/2015    PREGSERUM NEGATIVE 11/20/2019        ABGs: No results found for: \"PHART\", \"PO2ART\", \"UHP1MXY\", \"QGM9JGP\", \"BEART\", \"Z5SPBOCS\"     Type & Screen (If Applicable):  No results found for: \"LABABO\"    Drug/Infectious Status (If Applicable):  No results found for: \"HIV\", \"HEPCAB\"    COVID-19 Screening (If Applicable):   Lab Results   Component Value Date/Time    COVID19 Not-Detected 01/27/2025 12:14 PM    COVID19 Not Detected 04/24/2023 03:35 PM    COVID19 Not Detected 03/16/2023 11:00 PM           Anesthesia Evaluation  Patient summary reviewed and Nursing notes reviewed   no history of anesthetic complications:   Airway: Mallampati: III  TM distance: >3 FB   Neck ROM: full  Mouth opening: > = 3 FB   Dental: normal exam         Pulmonary:

## 2025-02-04 NOTE — BRIEF OP NOTE
Brief Postoperative Note      Patient: Anita Dave  YOB: 1965  MRN: [id number]    Date of Procedure: 2/4/2025    Pre-Op Diagnosis Codes:      * Malignant neoplasm of esophagus, unspecified location (HCC) [C15.9]    Post-Op Diagnosis: Same.        Procedure(s):  ESOPHAGOGASTRODUODENOSCOPY ULTRASOUND  ESOPHAGOGASTRODUODENOSCOPY BIOPSY    Surgeon(s):  Juaquin Klein MD    Assistant:  * No surgical staff found *    Anesthesia: Monitor Anesthesia Care    Estimated Blood Loss (mL): less than 50     Complications: None    Specimens:   ID Type Source Tests Collected by Time Destination   A : Risa-Hepatis Lymph node > FNA> Cytology Tissue Fine Needle Aspirate CYTOLOGY, NON-GYN Juaquin Klein MD 2/4/2025 1238    B : Antral BX - R/O H Pylori Gastric Gastric SURGICAL PATHOLOGY Juaquin Klein MD 2/4/2025 1201    C : 36cm - Barretts Esophagus Gastric Gastric SURGICAL PATHOLOGY Juaquin Klein MD 2/4/2025 1204    D : 35cm Barretts Esophagus BX Gastric Gastric SURGICAL PATHOLOGY Juaquin Klein MD 2/4/2025 1205    E : 34cm Barretts Esophagus BX Gastric Gastric SURGICAL PATHOLOGY Juaquin Klein MD 2/4/2025 1206    F : 33cmBarretts Esophagus BX Gastric Gastric SURGICAL PATHOLOGY Juaquin Klein MD 2/4/2025 1207    G : 32cm Barretts Esophagus BX Gastric Gastric SURGICAL PATHOLOGY Juaquin Klein MD 2/4/2025 1207    H : 31cm Barretts Esophagus BX Gastric Gastric SURGICAL PATHOLOGY Juaquin Klein MD 2/4/2025 1208    I : 30 cm Baretts Esophagus BX Gastric Gastric SURGICAL PATHOLOGY Juaquin Klein MD 2/4/2025 1209    J : Risa-Hepatis Lymph node > FNA> Biopsy Tissue Fine Needle Aspirate SURGICAL PATHOLOGY Juaquin Klein MD 2/4/2025 1243    K : Esophagus @ 30cm >FNA >Cytology Tissue Fine Needle Aspirate CYTOLOGY, NON-GYN Juaquin Klein MD 2/4/2025 1256    L : Esophagus @ 30cm >FNA  >Biopsy Tissue Fine Needle Aspirate SURGICAL PATHOLOGY Juaquin Klein MD 2/4/2025 1300        Implants:  * No implants in log *      Drains: *

## 2025-02-04 NOTE — ANESTHESIA POSTPROCEDURE EVALUATION
Department of Anesthesiology  Postprocedure Note    Patient: Anita Dave  MRN: [id number]  YOB: 1965  Date of evaluation: 2/4/2025    Procedure Summary       Date: 02/04/25 Room / Location: [place] [id number] / [facility]    Anesthesia Start: 1152 Anesthesia Stop: 1307    Procedures:       ESOPHAGOGASTRODUODENOSCOPY ENDOSCOPIC ULTRASOUND FINE NEEDLE ASPIRATION      ESOPHAGOGASTRODUODENOSCOPY BIOPSY Diagnosis:       Malignant neoplasm of esophagus, unspecified location (HCC)      (Malignant neoplasm of esophagus, unspecified location (HCC) [C15.9])    Surgeons: Juaquin Klein MD Responsible Provider: Jessi Dallas MD    Anesthesia Type: MAC ASA Status: 3            Anesthesia Type: No value filed.    Lauren Phase I: Lauren Score: 10    Lauren Phase II: Lauren Score: 10    Anesthesia Post Evaluation    Airway patency: patent  Cardiovascular status: hemodynamically stable  Respiratory status: acceptable  Hydration status: euvolemic        No notable events documented.

## 2025-02-04 NOTE — H&P
[facility]   Gastroenterology, Hepatology, &  Advanced Endoscopy    Consult       ASSESSMENT AND PLAN:    59y/F w/ history of GE junction adenocarcinoma s/p chemoradiation who presents for endoscopic evaluation and surveillance in the setting of recent PET showing activity in portocaval and retrocrural lymph nodes.     PLAN:  EGD/EUS        HISTORY OF PRESENT ILLNESS:      Ms. Anita Dave is a 59y/F w/ history of GE junction adenocarcinoma s/p chemoradiation who presents for endoscopic evaluation and surveillance in the setting of recent PET showing activity in portocaval and retrocrural lymph nodes.     Past Medical History:        Diagnosis Date    Aorta disorder (HCC)     ascending aortic dilation 4.3 cm echo 10/6/23 per Vascular note [facility] 10/20/23 in Epic & Dr. Cuellar    Atrial septal aneurysm     Dr. Cuellar following    Cancer of esophagus (HCC) 2023    chemo & radiation complete    Gall bladder disease     Herniated disc     Hyperlipidemia     Hypertension     Infectious lymphangitis of deep lymphatics     Kidney stone     h/o    CHRISTIAN (obstructive sleep apnea)     not yet using cpap    PE (pulmonary thromboembolism) (HCC) 2023    . On Eliquis    Pulmonary hypertension (HCC)     UTI (urinary tract infection) 2024    on antibiotic x 10 days-resolved per pt     Past Surgical History:        Procedure Laterality Date    CATARACT EXTRACTION      bilateral 2 weeks apart with lense implant     SECTION      COLONOSCOPY      states she is overdue    ESOPHAGOGASTRODUODENOSCOPY  2024    TUNNELED VENOUS PORT PLACEMENT      UPPER GASTROINTESTINAL ENDOSCOPY N/A 2023    EGD BIOPSY performed by Justin Sellers MD at [facility] ENDOSCOPY    WISDOM TOOTH EXTRACTION      WRIST SURGERY Right 2021     Social History:    TOBACCO:   reports that she has never smoked. She has never used smokeless tobacco.  ETOH:   reports no history of alcohol use.  DRUGS:   reports no history of drug use.  Family  History:       Problem Relation Age of Onset    Stroke Mother     High Blood Pressure Mother     Hypertension Mother     Arthritis Mother     Heart Disease Mother     Colon Cancer Father     Breast Cancer Sister 50    Cancer Sister     High Blood Pressure Brother     Heart Disease Brother     Arthritis Brother     Breast Cancer Paternal Grandmother     Breast Cancer Maternal Cousin        No current facility-administered medications for this encounter.    Current Outpatient Medications:     tiZANidine (ZANAFLEX) 4 MG tablet, Take 1 tablet by mouth every 8 hours as needed (spasm), Disp: 30 tablet, Rfl: 1    cefdinir (OMNICEF) 300 MG capsule, Take 1 capsule by mouth 2 times daily for 10 days, Disp: 20 capsule, Rfl: 0    D3-50 1.25 MG (82972 UT) CAPS, TAKE 1 CAPSULE BY MOUTH ONE TIME PER WEEK, Disp: 12 capsule, Rfl: 1    meloxicam (MOBIC) 15 MG tablet, TAKE 1 TABLET BY MOUTH EVERY DAY, Disp: 30 tablet, Rfl: 11    furosemide (LASIX) 20 MG tablet, TAKE 1 TABLET BY MOUTH EVERY DAY, Disp: 30 tablet, Rfl: 2    busPIRone (BUSPAR) 10 MG tablet, TAKE 1 TABLET BY MOUTH TWICE A DAY, Disp: 180 tablet, Rfl: 1    triamcinolone (KENALOG) 0.025 % ointment, Apply topically 2 times daily., Disp: 80 g, Rfl: 1    atorvastatin (LIPITOR) 40 MG tablet, TAKE 1 TABLET BY MOUTH EVERY DAY, Disp: 90 tablet, Rfl: 3    DULoxetine (CYMBALTA) 60 MG extended release capsule, TAKE 1 CAPSULE BY MOUTH EVERY DAY, Disp: 90 capsule, Rfl: 3    metoprolol tartrate (LOPRESSOR) 50 MG tablet, Take 1 tablet by mouth 2 times daily, Disp: 180 tablet, Rfl: 3    ELIQUIS 5 MG TABS tablet, TAKE 1 TABLET BY MOUTH TWICE A DAY (Patient taking differently: Take 1 tablet by mouth 2 times daily Indications: hold 2 days before procedure LD to be taken 12/30/24), Disp: 180 tablet, Rfl: 1    sucralfate (CARAFATE) 1 GM tablet, Take 1 tablet by mouth in the morning, at noon, and at bedtime for 7 days Crush and dissolve in medicine cup full of water and drink, Disp: 21 tablet,

## 2025-02-06 LAB — NON-GYN CYTOLOGY REPORT: NORMAL

## 2025-02-07 LAB — SURGICAL PATHOLOGY REPORT: NORMAL

## 2025-02-10 ENCOUNTER — HOSPITAL ENCOUNTER (OUTPATIENT)
Dept: RADIATION ONCOLOGY | Age: 60
Discharge: HOME | End: 2025-02-10
Payer: MEDICARE

## 2025-02-10 VITALS — BODY MASS INDEX: 37.63 KG/M2 | WEIGHT: 219.2 LBS

## 2025-02-10 DIAGNOSIS — C15.5 MALIGNANT NEOPLASM OF LOWER THIRD OF ESOPHAGUS (HCC): Primary | ICD-10-CM

## 2025-02-10 DIAGNOSIS — C77.2 MALIGNANT NEOPLASM METASTATIC TO INTRA-ABDOMINAL LYMPH NODE (HCC): ICD-10-CM

## 2025-02-10 PROBLEM — C77.9 CANCER OF LYMPH NODES, SECONDARY (HCC): Status: ACTIVE | Noted: 2025-02-10

## 2025-02-10 PROCEDURE — 99214 OFFICE O/P EST MOD 30 MIN: CPT | Performed by: RADIOLOGY

## 2025-02-10 NOTE — PROGRESS NOTES
Radiation Oncology Consult Note         2/10/2025    Anita Dave  59 y.o.   1965    REFERRING PROVIDER: Marleni    PCP:  Washington Rivers DO    CHIEF COMPLAINT: My biopsy came back positive     DIAGNOSIS:1) recurrent esophageal cancer, SP concurrent chemoradiation.  2) clinical stage IIa (cT1b, N1, M0, G3) adenocarcinoma of the distal esophagus in  patient with a recent history of thrombocytophilia, SP concurrent chemoradiation completed on 3/5/2024.    STAGING:  Cancer Staging   Malignant neoplasm of esophagus (HCC)  Staging form: Esophagus - Adenocarcinoma, AJCC 8th Edition  - Clinical stage from 12/29/2023: Stage IIA (cT1b, cN1, cM0, G3) - Signed by Stephanie Palafox MD on 12/29/2023      HISTORY OF PRESENT ILLNESS: Ms. Anita Dave  is a 59 y.o. year old female already known to me since I have treated her back in February/March 2024 with a course of concurrent chemoradiation up to a total dose of 50.4 Gray for a clinical stage IIa, N1, M0, G3, adenocarcinoma of the distal esophagus, HER2 positive.  She had a complete response, histologically proven, and she refused to undergo surgery at that time.  The patient had a recent PET/CT on 12/21/2024 that showed FDG avidity at the level of a retroperitoneal as well as a retrocrural lymph node.  For this reason the patient underwent EGD and EUS under the care of Dr. Klein on 2/4/2025.  According to the description of the intervention and to the pathology report, it looks like Dr. Klein completed 10 several esophageal biopsies that were negative on pathology report, he also completed 1 FNA of a saad hepatis lymph node and 1 EUS guided FNA of the esophagus at 30 cm from the incisors.  They both came back as positive for metastatic adenocarcinoma.  The FNA positive esophageal area appears to be within the previous radiation field.  The patient is here today to discuss the role of radiation therapy in her recurrent esophageal cancer.  She has an

## 2025-02-17 ENCOUNTER — TELEPHONE (OUTPATIENT)
Age: 60
End: 2025-02-17

## 2025-02-17 NOTE — TELEPHONE ENCOUNTER
Spoke to patient and advised her that dr. Klein would like her to follow up with her oncologist at [facility]. She said she is in touch with them already. Electronically signed by SCOTT DAVID LPN on 2/17/2025 at 9:54 AM

## 2025-02-19 ENCOUNTER — HOSPITAL ENCOUNTER (OUTPATIENT)
Dept: CARDIOLOGY | Age: 60
Discharge: HOME | End: 2025-02-21
Payer: MEDICARE

## 2025-02-19 VITALS
WEIGHT: 219 LBS | SYSTOLIC BLOOD PRESSURE: 126 MMHG | BODY MASS INDEX: 37.39 KG/M2 | DIASTOLIC BLOOD PRESSURE: 89 MMHG | HEIGHT: 64 IN

## 2025-02-19 DIAGNOSIS — Z09 CHEMOTHERAPY FOLLOW-UP EXAMINATION: ICD-10-CM

## 2025-02-19 LAB
ECHO AO ASC DIAM: 3.5 CM
ECHO AO ASCENDING AORTA INDEX: 1.72 CM/M2
ECHO AV AREA PEAK VELOCITY: 2.3 CM2
ECHO AV AREA VTI: 2.4 CM2
ECHO AV AREA/BSA PEAK VELOCITY: 1.1 CM2/M2
ECHO AV AREA/BSA VTI: 1.2 CM2/M2
ECHO AV CUSP MM: 1.6 CM
ECHO AV MEAN GRADIENT: 9 MMHG
ECHO AV MEAN VELOCITY: 1.4 M/S
ECHO AV PEAK GRADIENT: 17 MMHG
ECHO AV PEAK VELOCITY: 2.1 M/S
ECHO AV VELOCITY RATIO: 0.67
ECHO AV VTI: 39 CM
ECHO BSA: 2.12 M2
ECHO EST RA PRESSURE: 3 MMHG
ECHO LA DIAMETER INDEX: 1.82 CM/M2
ECHO LA DIAMETER: 3.7 CM
ECHO LA VOL A-L A2C: 54 ML (ref 22–52)
ECHO LA VOL A-L A4C: 38 ML (ref 22–52)
ECHO LA VOL MOD A2C: 50 ML (ref 22–52)
ECHO LA VOL MOD A4C: 36 ML (ref 22–52)
ECHO LA VOLUME AREA LENGTH: 46 ML
ECHO LA VOLUME INDEX A-L A2C: 27 ML/M2 (ref 16–34)
ECHO LA VOLUME INDEX A-L A4C: 19 ML/M2 (ref 16–34)
ECHO LA VOLUME INDEX AREA LENGTH: 23 ML/M2 (ref 16–34)
ECHO LA VOLUME INDEX MOD A2C: 25 ML/M2 (ref 16–34)
ECHO LA VOLUME INDEX MOD A4C: 18 ML/M2 (ref 16–34)
ECHO LV FRACTIONAL SHORTENING: 30 % (ref 28–44)
ECHO LV INTERNAL DIMENSION DIASTOLE INDEX: 2.27 CM/M2
ECHO LV INTERNAL DIMENSION DIASTOLIC: 4.6 CM (ref 3.9–5.3)
ECHO LV INTERNAL DIMENSION SYSTOLIC INDEX: 1.58 CM/M2
ECHO LV INTERNAL DIMENSION SYSTOLIC: 3.2 CM
ECHO LV ISOVOLUMETRIC RELAXATION TIME (IVRT): 86.5 MS
ECHO LV IVSD: 1 CM (ref 0.6–0.9)
ECHO LV IVSS: 1.1 CM
ECHO LV MASS 2D: 158.8 G (ref 67–162)
ECHO LV MASS INDEX 2D: 78.2 G/M2 (ref 43–95)
ECHO LV POSTERIOR WALL DIASTOLIC: 1 CM (ref 0.6–0.9)
ECHO LV POSTERIOR WALL SYSTOLIC: 1.1 CM
ECHO LV RELATIVE WALL THICKNESS RATIO: 0.43
ECHO LVOT AREA: 3.5 CM2
ECHO LVOT AV VTI INDEX: 0.71
ECHO LVOT DIAM: 2.1 CM
ECHO LVOT MEAN GRADIENT: 5 MMHG
ECHO LVOT PEAK GRADIENT: 8 MMHG
ECHO LVOT PEAK VELOCITY: 1.4 M/S
ECHO LVOT STROKE VOLUME INDEX: 46.9 ML/M2
ECHO LVOT SV: 95.2 ML
ECHO LVOT VTI: 27.5 CM
ECHO MV "A" WAVE DURATION: 103.8 MSEC
ECHO MV A VELOCITY: 1.29 M/S
ECHO MV AREA PHT: 3.7 CM2
ECHO MV AREA VTI: 4.1 CM2
ECHO MV E DECELERATION TIME (DT): 258.9 MS
ECHO MV E VELOCITY: 0.66 M/S
ECHO MV E/A RATIO: 0.51
ECHO MV LVOT VTI INDEX: 0.85
ECHO MV MAX VELOCITY: 1.5 M/S
ECHO MV MEAN GRADIENT: 2 MMHG
ECHO MV MEAN VELOCITY: 0.7 M/S
ECHO MV PEAK GRADIENT: 9 MMHG
ECHO MV PRESSURE HALF TIME (PHT): 59.4 MS
ECHO MV VTI: 23.3 CM
ECHO PV MAX VELOCITY: 1.2 M/S
ECHO PV MEAN GRADIENT: 3 MMHG
ECHO PV MEAN VELOCITY: 0.9 M/S
ECHO PV PEAK GRADIENT: 6 MMHG
ECHO PV VTI: 24.5 CM
ECHO RIGHT VENTRICULAR SYSTOLIC PRESSURE (RVSP): 33 MMHG
ECHO RV INTERNAL DIMENSION: 3.5 CM
ECHO TV REGURGITANT MAX VELOCITY: 2.75 M/S
ECHO TV REGURGITANT PEAK GRADIENT: 30 MMHG

## 2025-02-19 PROCEDURE — 93306 TTE W/DOPPLER COMPLETE: CPT

## 2025-02-24 ENCOUNTER — TELEPHONE (OUTPATIENT)
Dept: PRIMARY CARE CLINIC | Age: 60
End: 2025-02-24

## 2025-02-24 LAB
ECHO AO ASC DIAM: 3.5 CM
ECHO AO ASCENDING AORTA INDEX: 1.72 CM/M2
ECHO AV AREA PEAK VELOCITY: 2.3 CM2
ECHO AV AREA VTI: 2.4 CM2
ECHO AV AREA/BSA PEAK VELOCITY: 1.1 CM2/M2
ECHO AV AREA/BSA VTI: 1.2 CM2/M2
ECHO AV CUSP MM: 1.6 CM
ECHO AV MEAN GRADIENT: 9 MMHG
ECHO AV MEAN VELOCITY: 1.4 M/S
ECHO AV PEAK GRADIENT: 17 MMHG
ECHO AV PEAK VELOCITY: 2.1 M/S
ECHO AV VELOCITY RATIO: 0.67
ECHO AV VTI: 39 CM
ECHO BSA: 2.12 M2
ECHO EST RA PRESSURE: 3 MMHG
ECHO LA DIAMETER INDEX: 1.82 CM/M2
ECHO LA DIAMETER: 3.7 CM
ECHO LA VOL A-L A2C: 54 ML (ref 22–52)
ECHO LA VOL A-L A4C: 38 ML (ref 22–52)
ECHO LA VOL MOD A2C: 50 ML (ref 22–52)
ECHO LA VOL MOD A4C: 36 ML (ref 22–52)
ECHO LA VOLUME AREA LENGTH: 46 ML
ECHO LA VOLUME INDEX A-L A2C: 27 ML/M2 (ref 16–34)
ECHO LA VOLUME INDEX A-L A4C: 19 ML/M2 (ref 16–34)
ECHO LA VOLUME INDEX AREA LENGTH: 23 ML/M2 (ref 16–34)
ECHO LA VOLUME INDEX MOD A2C: 25 ML/M2 (ref 16–34)
ECHO LA VOLUME INDEX MOD A4C: 18 ML/M2 (ref 16–34)
ECHO LV EF PHYSICIAN: 60 %
ECHO LV FRACTIONAL SHORTENING: 30 % (ref 28–44)
ECHO LV INTERNAL DIMENSION DIASTOLE INDEX: 2.27 CM/M2
ECHO LV INTERNAL DIMENSION DIASTOLIC: 4.6 CM (ref 3.9–5.3)
ECHO LV INTERNAL DIMENSION SYSTOLIC INDEX: 1.58 CM/M2
ECHO LV INTERNAL DIMENSION SYSTOLIC: 3.2 CM
ECHO LV ISOVOLUMETRIC RELAXATION TIME (IVRT): 86.5 MS
ECHO LV IVSD: 1 CM (ref 0.6–0.9)
ECHO LV IVSS: 1.1 CM
ECHO LV MASS 2D: 158.8 G (ref 67–162)
ECHO LV MASS INDEX 2D: 78.2 G/M2 (ref 43–95)
ECHO LV POSTERIOR WALL DIASTOLIC: 1 CM (ref 0.6–0.9)
ECHO LV POSTERIOR WALL SYSTOLIC: 1.1 CM
ECHO LV RELATIVE WALL THICKNESS RATIO: 0.43
ECHO LVOT AREA: 3.5 CM2
ECHO LVOT AV VTI INDEX: 0.71
ECHO LVOT DIAM: 2.1 CM
ECHO LVOT MEAN GRADIENT: 5 MMHG
ECHO LVOT PEAK GRADIENT: 8 MMHG
ECHO LVOT PEAK VELOCITY: 1.4 M/S
ECHO LVOT STROKE VOLUME INDEX: 46.9 ML/M2
ECHO LVOT SV: 95.2 ML
ECHO LVOT VTI: 27.5 CM
ECHO MV "A" WAVE DURATION: 103.8 MSEC
ECHO MV A VELOCITY: 1.29 M/S
ECHO MV AREA PHT: 3.7 CM2
ECHO MV AREA VTI: 4.1 CM2
ECHO MV E DECELERATION TIME (DT): 258.9 MS
ECHO MV E VELOCITY: 0.66 M/S
ECHO MV E/A RATIO: 0.51
ECHO MV LVOT VTI INDEX: 0.85
ECHO MV MAX VELOCITY: 1.5 M/S
ECHO MV MEAN GRADIENT: 2 MMHG
ECHO MV MEAN VELOCITY: 0.7 M/S
ECHO MV PEAK GRADIENT: 9 MMHG
ECHO MV PRESSURE HALF TIME (PHT): 59.4 MS
ECHO MV VTI: 23.3 CM
ECHO PV MAX VELOCITY: 1.2 M/S
ECHO PV MEAN GRADIENT: 3 MMHG
ECHO PV MEAN VELOCITY: 0.9 M/S
ECHO PV PEAK GRADIENT: 6 MMHG
ECHO PV VTI: 24.5 CM
ECHO RIGHT VENTRICULAR SYSTOLIC PRESSURE (RVSP): 33 MMHG
ECHO RV INTERNAL DIMENSION: 3.5 CM
ECHO TV REGURGITANT MAX VELOCITY: 2.75 M/S
ECHO TV REGURGITANT PEAK GRADIENT: 30 MMHG

## 2025-02-24 NOTE — TELEPHONE ENCOUNTER
Pt says medical certificate needs dated with current date, pt had them fax it again. Please complete and send back by fax [phone] and by email at [email or web address]  Please notify pt when completed.

## 2025-02-26 DIAGNOSIS — M79.89 LEG SWELLING: ICD-10-CM

## 2025-02-26 DIAGNOSIS — R06.02 SOB (SHORTNESS OF BREATH): ICD-10-CM

## 2025-02-26 RX ORDER — FUROSEMIDE 20 MG/1
20 TABLET ORAL DAILY
Qty: 90 TABLET | Refills: 1 | Status: SHIPPED | OUTPATIENT
Start: 2025-02-26

## 2025-03-03 ENCOUNTER — OFFICE VISIT (OUTPATIENT)
Dept: FAMILY MEDICINE CLINIC | Age: 60
End: 2025-03-03
Payer: MEDICARE

## 2025-03-03 VITALS
DIASTOLIC BLOOD PRESSURE: 82 MMHG | WEIGHT: 218 LBS | RESPIRATION RATE: 18 BRPM | BODY MASS INDEX: 37.42 KG/M2 | OXYGEN SATURATION: 98 % | HEART RATE: 74 BPM | SYSTOLIC BLOOD PRESSURE: 130 MMHG | TEMPERATURE: 97 F

## 2025-03-03 DIAGNOSIS — N39.0 URINARY TRACT INFECTION WITHOUT HEMATURIA, SITE UNSPECIFIED: Primary | ICD-10-CM

## 2025-03-03 DIAGNOSIS — E11.65 TYPE 2 DIABETES MELLITUS WITH HYPERGLYCEMIA, UNSPECIFIED WHETHER LONG TERM INSULIN USE (HCC): ICD-10-CM

## 2025-03-03 DIAGNOSIS — R35.0 URINARY FREQUENCY: ICD-10-CM

## 2025-03-03 DIAGNOSIS — M79.605 LEG PAIN, BILATERAL: ICD-10-CM

## 2025-03-03 DIAGNOSIS — E66.01 MORBID (SEVERE) OBESITY DUE TO EXCESS CALORIES: ICD-10-CM

## 2025-03-03 DIAGNOSIS — M79.604 LEG PAIN, BILATERAL: ICD-10-CM

## 2025-03-03 LAB
ALBUMIN: 4.2 G/DL (ref 3.5–5.2)
ALP BLD-CCNC: 131 U/L (ref 35–104)
ALT SERPL-CCNC: 18 U/L (ref 0–32)
ANION GAP SERPL CALCULATED.3IONS-SCNC: 12 MMOL/L (ref 7–16)
AST SERPL-CCNC: 17 U/L (ref 0–31)
BASOPHILS ABSOLUTE: 0.01 K/UL (ref 0–0.2)
BASOPHILS RELATIVE PERCENT: 0 % (ref 0–2)
BILIRUB SERPL-MCNC: 0.4 MG/DL (ref 0–1.2)
BILIRUBIN, POC: NORMAL
BLOOD URINE, POC: NORMAL
BUN BLDV-MCNC: 14 MG/DL (ref 6–20)
CALCIUM SERPL-MCNC: 9.2 MG/DL (ref 8.6–10.2)
CHLORIDE BLD-SCNC: 104 MMOL/L (ref 98–107)
CLARITY, POC: CLEAR
CO2: 24 MMOL/L (ref 22–29)
COLOR, POC: YELLOW
CREAT SERPL-MCNC: 0.7 MG/DL (ref 0.5–1)
EOSINOPHILS ABSOLUTE: 0.2 K/UL (ref 0.05–0.5)
EOSINOPHILS RELATIVE PERCENT: 4 % (ref 0–6)
GFR, ESTIMATED: >90 ML/MIN/1.73M2
GLUCOSE BLD-MCNC: 94 MG/DL (ref 74–99)
GLUCOSE URINE, POC: NORMAL MG/DL
HCT VFR BLD CALC: 40.6 % (ref 34–48)
HEMOGLOBIN: 13 G/DL (ref 11.5–15.5)
IMMATURE GRANULOCYTES %: 1 % (ref 0–5)
IMMATURE GRANULOCYTES ABSOLUTE: 0.03 K/UL (ref 0–0.58)
KETONES, POC: NORMAL MG/DL
LEUKOCYTE EST, POC: NORMAL
LYMPHOCYTES ABSOLUTE: 0.98 K/UL (ref 1.5–4)
LYMPHOCYTES RELATIVE PERCENT: 17 % (ref 20–42)
MAGNESIUM: 1.9 MG/DL (ref 1.6–2.6)
MCH RBC QN AUTO: 27.5 PG (ref 26–35)
MCHC RBC AUTO-ENTMCNC: 32 G/DL (ref 32–34.5)
MCV RBC AUTO: 85.8 FL (ref 80–99.9)
MONOCYTES ABSOLUTE: 0.29 K/UL (ref 0.1–0.95)
MONOCYTES RELATIVE PERCENT: 5 % (ref 2–12)
NEUTROPHILS ABSOLUTE: 4.14 K/UL (ref 1.8–7.3)
NEUTROPHILS RELATIVE PERCENT: 73 % (ref 43–80)
NITRITE, POC: POSITIVE
PDW BLD-RTO: 14.6 % (ref 11.5–15)
PH, POC: 6
PLATELET # BLD: 289 K/UL (ref 130–450)
PMV BLD AUTO: 10.5 FL (ref 7–12)
POTASSIUM SERPL-SCNC: 3.9 MMOL/L (ref 3.5–5)
PROTEIN, POC: NORMAL MG/DL
RBC # BLD: 4.73 M/UL (ref 3.5–5.5)
SODIUM BLD-SCNC: 140 MMOL/L (ref 132–146)
SPECIFIC GRAVITY, POC: 1.03
TOTAL PROTEIN: 6.6 G/DL (ref 6.4–8.3)
UROBILINOGEN, POC: 0.2 MG/DL
WBC # BLD: 5.7 K/UL (ref 4.5–11.5)

## 2025-03-03 PROCEDURE — 99215 OFFICE O/P EST HI 40 MIN: CPT | Performed by: PHYSICIAN ASSISTANT

## 2025-03-03 PROCEDURE — 3079F DIAST BP 80-89 MM HG: CPT | Performed by: PHYSICIAN ASSISTANT

## 2025-03-03 PROCEDURE — 3075F SYST BP GE 130 - 139MM HG: CPT | Performed by: PHYSICIAN ASSISTANT

## 2025-03-03 PROCEDURE — 81002 URINALYSIS NONAUTO W/O SCOPE: CPT | Performed by: PHYSICIAN ASSISTANT

## 2025-03-03 RX ORDER — CEFDINIR 300 MG/1
300 CAPSULE ORAL 2 TIMES DAILY
Qty: 14 CAPSULE | Refills: 0 | Status: SHIPPED | OUTPATIENT
Start: 2025-03-03 | End: 2025-03-10

## 2025-03-03 NOTE — PROGRESS NOTES
otherwise stated in this report the patient's positive and negative responses for review of systems for constitutional, eyes, ENT, cardiovascular, respiratory, gastrointestinal, neurological, , musculoskeletal, and integument systems and related systems to the presenting problem are either stated in the history of present illness or were not pertinent or were negative for the symptoms and/or complaints related to the presenting medical problem.  Positives and pertinent negatives as per HPI.  All others reviewed and are negative.      PMH:     Past Medical History:   Diagnosis Date    Aorta disorder     ascending aortic dilation 4.3 cm echo 10/6/23 per Vascular note [facility] 10/20/23 in Epic & Dr. Cuellar    Atrial septal aneurysm     Dr. Cuellar following    Cancer of esophagus (HCC) 2023    chemo & radiation complete    Gall bladder disease     Herniated disc     Hyperlipidemia     Hypertension     Infectious lymphangitis of deep lymphatics     Kidney stone     h/o    CHRISTIAN (obstructive sleep apnea)     not yet using cpap    PE (pulmonary thromboembolism) (HCC) 2023    . On Eliquis    Pulmonary hypertension (HCC)     UTI (urinary tract infection) 2024    on antibiotic x 10 days-resolved per pt       Past Surgical History:   Procedure Laterality Date    CATARACT EXTRACTION      bilateral 2 weeks apart with lense implant     SECTION      COLONOSCOPY      states she is overdue    ESOPHAGOGASTRODUODENOSCOPY  2024    TUNNELED VENOUS PORT PLACEMENT      UPPER GASTROINTESTINAL ENDOSCOPY N/A 2023    EGD BIOPSY performed by Justin Sellers MD at [facility]OSCOPY    UPPER GASTROINTESTINAL ENDOSCOPY N/A 2025    ESOPHAGOGASTRODUODENOSCOPY ENDOSCOPIC ULTRASOUND FINE NEEDLE ASPIRATION performed by Juaquin Klein MD at [facility] ENDOSCOPY    UPPER GASTROINTESTINAL ENDOSCOPY N/A 2025    ESOPHAGOGASTRODUODENOSCOPY BIOPSY performed by Juaquin Klein MD at [facility] ENDOSCOPY    WISDOM TOOTH EXTRACTION

## 2025-03-04 LAB
FOLATE: 15.8 NG/ML (ref 4.8–24.2)
HBA1C MFR BLD: 6.1 % (ref 4–5.6)
VITAMIN B-12: 352 PG/ML (ref 211–946)

## 2025-03-06 LAB
CULTURE: ABNORMAL
CULTURE: ABNORMAL
SPECIMEN DESCRIPTION: ABNORMAL

## 2025-03-06 RX ORDER — BENZONATATE 100 MG/1
CAPSULE ORAL
Qty: 60 CAPSULE | Refills: 1 | Status: SHIPPED | OUTPATIENT
Start: 2025-03-06

## 2025-03-06 NOTE — RESULT ENCOUNTER NOTE
Urine culture did show evidence of E. coli it is sensitive to the antibiotic.  Please finish out and follow-up with PCP

## 2025-03-11 ENCOUNTER — OFFICE VISIT (OUTPATIENT)
Dept: PRIMARY CARE CLINIC | Age: 60
End: 2025-03-11
Payer: MEDICARE

## 2025-03-11 VITALS
WEIGHT: 221 LBS | TEMPERATURE: 97.1 F | OXYGEN SATURATION: 97 % | HEIGHT: 64 IN | BODY MASS INDEX: 37.73 KG/M2 | RESPIRATION RATE: 18 BRPM | SYSTOLIC BLOOD PRESSURE: 120 MMHG | DIASTOLIC BLOOD PRESSURE: 82 MMHG | HEART RATE: 67 BPM

## 2025-03-11 DIAGNOSIS — E11.9 TYPE 2 DIABETES MELLITUS WITHOUT COMPLICATION, WITHOUT LONG-TERM CURRENT USE OF INSULIN (HCC): ICD-10-CM

## 2025-03-11 DIAGNOSIS — F32.A DEPRESSION, UNSPECIFIED DEPRESSION TYPE: Primary | ICD-10-CM

## 2025-03-11 DIAGNOSIS — C15.9 MALIGNANT NEOPLASM OF ESOPHAGUS, UNSPECIFIED LOCATION (HCC): ICD-10-CM

## 2025-03-11 PROCEDURE — 99214 OFFICE O/P EST MOD 30 MIN: CPT | Performed by: FAMILY MEDICINE

## 2025-03-11 PROCEDURE — 3044F HG A1C LEVEL LT 7.0%: CPT | Performed by: FAMILY MEDICINE

## 2025-03-11 PROCEDURE — 3074F SYST BP LT 130 MM HG: CPT | Performed by: FAMILY MEDICINE

## 2025-03-11 PROCEDURE — 3079F DIAST BP 80-89 MM HG: CPT | Performed by: FAMILY MEDICINE

## 2025-03-11 SDOH — ECONOMIC STABILITY: INCOME INSECURITY: IN THE LAST 12 MONTHS, WAS THERE A TIME WHEN YOU WERE NOT ABLE TO PAY THE MORTGAGE OR RENT ON TIME?: YES

## 2025-03-11 SDOH — ECONOMIC STABILITY: FOOD INSECURITY: WITHIN THE PAST 12 MONTHS, THE FOOD YOU BOUGHT JUST DIDN'T LAST AND YOU DIDN'T HAVE MONEY TO GET MORE.: OFTEN TRUE

## 2025-03-11 SDOH — ECONOMIC STABILITY: TRANSPORTATION INSECURITY
IN THE PAST 12 MONTHS, HAS LACK OF TRANSPORTATION KEPT YOU FROM MEETINGS, WORK, OR FROM GETTING THINGS NEEDED FOR DAILY LIVING?: NO

## 2025-03-11 SDOH — ECONOMIC STABILITY: FOOD INSECURITY: WITHIN THE PAST 12 MONTHS, YOU WORRIED THAT YOUR FOOD WOULD RUN OUT BEFORE YOU GOT MONEY TO BUY MORE.: OFTEN TRUE

## 2025-03-11 SDOH — ECONOMIC STABILITY: TRANSPORTATION INSECURITY
IN THE PAST 12 MONTHS, HAS THE LACK OF TRANSPORTATION KEPT YOU FROM MEDICAL APPOINTMENTS OR FROM GETTING MEDICATIONS?: NO

## 2025-03-11 ASSESSMENT — ENCOUNTER SYMPTOMS
APNEA: 0
SORE THROAT: 0
BLOOD IN STOOL: 0
BACK PAIN: 0
PHOTOPHOBIA: 0
VOMITING: 0
ALLERGIC/IMMUNOLOGIC NEGATIVE: 1
CHEST TIGHTNESS: 0
SINUS PRESSURE: 0
DIARRHEA: 0
COLOR CHANGE: 0
SHORTNESS OF BREATH: 0
COUGH: 0
NAUSEA: 0
FACIAL SWELLING: 0
ABDOMINAL PAIN: 0
WHEEZING: 0

## 2025-03-11 ASSESSMENT — PATIENT HEALTH QUESTIONNAIRE - PHQ9
SUM OF ALL RESPONSES TO PHQ QUESTIONS 1-9: 22
5. POOR APPETITE OR OVEREATING: NEARLY EVERY DAY
10. IF YOU CHECKED OFF ANY PROBLEMS, HOW DIFFICULT HAVE THESE PROBLEMS MADE IT FOR YOU TO DO YOUR WORK, TAKE CARE OF THINGS AT HOME, OR GET ALONG WITH OTHER PEOPLE: EXTREMELY DIFFICULT
8. MOVING OR SPEAKING SO SLOWLY THAT OTHER PEOPLE COULD HAVE NOTICED. OR THE OPPOSITE - BEING SO FIDGETY OR RESTLESS THAT YOU HAVE BEEN MOVING AROUND A LOT MORE THAN USUAL: NOT AT ALL
3. TROUBLE FALLING OR STAYING ASLEEP: NEARLY EVERY DAY
8. MOVING OR SPEAKING SO SLOWLY THAT OTHER PEOPLE COULD HAVE NOTICED. OR THE OPPOSITE, BEING SO FIGETY OR RESTLESS THAT YOU HAVE BEEN MOVING AROUND A LOT MORE THAN USUAL: NOT AT ALL
1. LITTLE INTEREST OR PLEASURE IN DOING THINGS: NEARLY EVERY DAY
4. FEELING TIRED OR HAVING LITTLE ENERGY: NEARLY EVERY DAY
SUM OF ALL RESPONSES TO PHQ QUESTIONS 1-9: 22
SUM OF ALL RESPONSES TO PHQ QUESTIONS 1-9: 22
5. POOR APPETITE OR OVEREATING: NEARLY EVERY DAY
2. FEELING DOWN, DEPRESSED OR HOPELESS: NEARLY EVERY DAY
7. TROUBLE CONCENTRATING ON THINGS, SUCH AS READING THE NEWSPAPER OR WATCHING TELEVISION: NEARLY EVERY DAY
9. THOUGHTS THAT YOU WOULD BE BETTER OFF DEAD, OR OF HURTING YOURSELF: SEVERAL DAYS
9. THOUGHTS THAT YOU WOULD BE BETTER OFF DEAD, OR OF HURTING YOURSELF: SEVERAL DAYS
10. IF YOU CHECKED OFF ANY PROBLEMS, HOW DIFFICULT HAVE THESE PROBLEMS MADE IT FOR YOU TO DO YOUR WORK, TAKE CARE OF THINGS AT HOME, OR GET ALONG WITH OTHER PEOPLE: EXTREMELY DIFFICULT
4. FEELING TIRED OR HAVING LITTLE ENERGY: NEARLY EVERY DAY
2. FEELING DOWN, DEPRESSED OR HOPELESS: NEARLY EVERY DAY
SUM OF ALL RESPONSES TO PHQ QUESTIONS 1-9: 21
6. FEELING BAD ABOUT YOURSELF - OR THAT YOU ARE A FAILURE OR HAVE LET YOURSELF OR YOUR FAMILY DOWN: NEARLY EVERY DAY
3. TROUBLE FALLING OR STAYING ASLEEP: NEARLY EVERY DAY
6. FEELING BAD ABOUT YOURSELF - OR THAT YOU ARE A FAILURE OR HAVE LET YOURSELF OR YOUR FAMILY DOWN: NEARLY EVERY DAY
SUM OF ALL RESPONSES TO PHQ QUESTIONS 1-9: 22
1. LITTLE INTEREST OR PLEASURE IN DOING THINGS: NEARLY EVERY DAY
7. TROUBLE CONCENTRATING ON THINGS, SUCH AS READING THE NEWSPAPER OR WATCHING TELEVISION: NEARLY EVERY DAY

## 2025-03-11 ASSESSMENT — COLUMBIA-SUICIDE SEVERITY RATING SCALE - C-SSRS
6. IN YOUR LIFETIME, HAVE YOU EVER DONE ANYTHING, STARTED TO DO ANYTHING, OR PREPARED TO DO ANYTHING TO END YOUR LIFE?: YES
7. DID THIS OCCUR IN THE LAST THREE MONTHS: NO
2. IN THE PAST MONTH, HAVE YOU ACTUALLY HAD ANY THOUGHTS OF KILLING YOURSELF?: NO
1. IN THE PAST MONTH, HAVE YOU WISHED YOU WERE DEAD OR WISHED YOU COULD GO TO SLEEP AND NOT WAKE UP?: YES

## 2025-03-11 NOTE — PROGRESS NOTES
Chief Complaint:   Chief Complaint   Patient presents with    Discuss Labs    Depression     PHQ 22       Depression  Visit Type: initial  Onset of symptoms: more than 1 year ago  Progression since onset: gradually worsening  Patient is not experiencing: confusion, nervousness/anxiety, palpitations and shortness of breath.  Frequency of symptoms: constantly   Severity: moderate   Sleep quality: fair  Patient has a history of: mental illness  Compliance with treatment: good    Mental Health Problem  The primary symptoms include dysphoric mood.   The degree of incapacity that she is experiencing as a consequence of her illness is moderate. Additional symptoms of the illness do not include agitation, headaches or abdominal pain. She does not admit to suicidal ideas. She does not have a plan to attempt suicide. She does not contemplate harming themself. She has not already injured self. She does not contemplate injuring another person. She has not already  injured another person.       Patient Active Problem List   Diagnosis    Palpitations    Atypical chest pain    Atrial septal aneurysm    Type 2 diabetes mellitus without complication, without long-term current use of insulin (HCC)    Hyperlipidemia    Essential hypertension    Acute electrocardiogram changes    Lactic acidosis    Right heart enlargement    S/P admn tPA in diff fac w/n last 24 hr bef adm to crnt fac    Anemia    Asymptomatic bacteriuria    Primary hypertension    Aneurysm of ascending aorta without rupture    Abdominal pain, epigastric    Malignant neoplasm of esophagus (HCC)    Anxiety    Cancer of esophagus (HCC)    Localized swelling of both lower legs    Ecchymosis    Chronic obstructive pulmonary disease, unspecified    Primary malignant neoplasm of esophagogastric junction (HCC)    FH: colon cancer    Primary adenocarcinoma of esophagogastric junction (HCC)    Cancer of lymph nodes, secondary (HCC)    Morbid (severe) obesity due to excess

## 2025-03-12 ENCOUNTER — OFFICE VISIT (OUTPATIENT)
Dept: CARDIOLOGY CLINIC | Age: 60
End: 2025-03-12
Payer: MEDICARE

## 2025-03-12 VITALS
DIASTOLIC BLOOD PRESSURE: 80 MMHG | HEART RATE: 69 BPM | WEIGHT: 220.7 LBS | RESPIRATION RATE: 18 BRPM | TEMPERATURE: 97.1 F | BODY MASS INDEX: 37.68 KG/M2 | SYSTOLIC BLOOD PRESSURE: 122 MMHG | HEIGHT: 64 IN | OXYGEN SATURATION: 95 %

## 2025-03-12 DIAGNOSIS — I10 PRIMARY HYPERTENSION: Primary | ICD-10-CM

## 2025-03-12 PROCEDURE — 99214 OFFICE O/P EST MOD 30 MIN: CPT | Performed by: INTERNAL MEDICINE

## 2025-03-12 PROCEDURE — 93000 ELECTROCARDIOGRAM COMPLETE: CPT | Performed by: INTERNAL MEDICINE

## 2025-03-12 PROCEDURE — 3079F DIAST BP 80-89 MM HG: CPT | Performed by: INTERNAL MEDICINE

## 2025-03-12 PROCEDURE — 3074F SYST BP LT 130 MM HG: CPT | Performed by: INTERNAL MEDICINE

## 2025-03-12 NOTE — PATIENT INSTRUCTIONS
Coronary CTA  results reviewed, as above and discussed the patient,  has non obstructive CAD and mild TAA.  Recent chest CT results done on 12/4/2024 were reviewed, thoracic aortic aneurysm measuring at 4.3 x 4.3 cm at the pulmonary artery level slightly increased by 1 mm.  Will continue metoprolol  50 mg twice a day.  Continue atorvastatin 40 mg p.o. daily.  Patient was advised to stay well-hydrated  She was recommended to restart losartan 25 mg p.o. daily on follow-up visit because of the thoracic aortic aneurysm not because of high blood pressure.    Follow up with oncology  service as scheduled.  Continue current mediations  including Eliquis   Recent labs were reviewed, normal CBC, BMP A1c 6.1.  Echo results reviewed, as above has normal diastolic and systolic function.  Coronary CTA showed mild nonobstructive CAD.  Follow up with me in 3 months.

## 2025-03-12 NOTE — PROGRESS NOTES
OUTPATIENT CARDIOLOGY FOLLOW-UP    Name: Anita Dave    Age: 59 y.o.    Primary Care Physician: Washington Rivers DO    Date of Service: 3/12/2025    Chief Complaint:   Chief Complaint   Patient presents with    Follow-up       Interim History:   Ms. Dave is a 59-year-old  female with history of morbid obesity, atrial septal aneurysm, palpitations and atypical chest pain presented to the office visit for evaluation of dyspnea with exertion and leg edema.  She was evaluated in the office by Dr. Constantino on 11/5/2021 and noted that her Holter, stress test and echo results were normal and no further cardiac work-up was recommended.  Patient was reassured and was recommended follow-up as needed.  She has been having dyspnea for the past 15 years along with some chest pain.  She told me that she recently lost her  to  colon cancer and passed away in January 2022.    She was seen in the office on 7/24/24, since last visit, she has not had any ER visits or hospitalizations.  She is compliant with medications, as well as salt and fluid intake.  She does not take any over-the-counter arthritis medications. She was diagnosed with esophageal adenocarcinoma stage T1 and N1 in11/23.  She is done with chemo and XRT and now eosphageal cancer is in remission and she decided not to go for surgery and following with oncolgy service.  Patient underwent surveillance EGD and also PET scans for any recurrence and was told she has a recurrence of the cancer involving the lymph nodes without any Arellano stasis.  Patient was initiated back on chemo and immunotherapy since 2/24/2025..    She had chest pain on 4/24/23, and was associated with dizziness, sweating, patient went to the emergency room and was evaluated and discharged home.  Recently, she was admitted to hospital from 3/6/23 to 3/20/23 with bilateral submassive PE.  She was initiated on anticoagulation therapy and currently taking Eliquis 5 mg p.o. twice

## 2025-03-13 NOTE — PATIENT INSTRUCTIONS
[employer] - Financial Resources*  (Call [employer]/211 if need more resources.)       Utility:  [employer]  What they offer: Limited assistance to restore/ prevent utility disconnection.  Phone Number: [phone]  Address: [facility] [address]  Website: Cover L[employer]creen  [facility] Action Program  Utility assistance  [phone]  [facility] Community Action Partnership  Utility assistance   [phone]  Community Action Agency of [facility]  Utility assistance  [phone]  Financial or Medical  HELP NETWORK OF [facility]:  What they do: Provides 24-hr, 7 days a week access to information on community resources for financial help. [facility] AND [facility]  Phone: 211 or [phone]            [facility] JOB AND FAMILY SERVICES:  MAIN [facility] LINE FOR ALL [facility]: [phone]  What they do: [place] works first with temporary cash assistance if there are children in household.   [facility] DJFS: [address] [place] [address]  Phone: [phone], [phone]  [facility] DJFS: [address] [place] [address]  Phone: [phone]  [facility] DJFS: [address] [place] [address]  Phone: [phone]  Website: s.[place].[facility]    [employer] Financial Assistance  What they offer: Assistance with [employer] bills  Phone: [phone], option #5   Medications:  [facility] [employer]  What they offer: [employer] tracks prescription drug prices and provides free drug coupons for discounts on medications.  Website: https://www.[employer]  [employer]  What they offer: [employer] offers free information on medications and healthcare cost savings programs including prescription assistance programs, coupons, and discount programs.  Helpline: 740.410.6624  Website: https://www.needSeeking Alphaeds.org    [employer]  What they offer: Information about free and low-cost medicine programs.  Website:

## 2025-03-17 ENCOUNTER — OFFICE VISIT (OUTPATIENT)
Dept: FAMILY MEDICINE CLINIC | Age: 60
End: 2025-03-17
Payer: MEDICARE

## 2025-03-17 VITALS
WEIGHT: 213.6 LBS | TEMPERATURE: 97.3 F | BODY MASS INDEX: 36.47 KG/M2 | DIASTOLIC BLOOD PRESSURE: 82 MMHG | OXYGEN SATURATION: 97 % | HEART RATE: 120 BPM | SYSTOLIC BLOOD PRESSURE: 136 MMHG | HEIGHT: 64 IN

## 2025-03-17 DIAGNOSIS — N39.0 URINARY TRACT INFECTION WITHOUT HEMATURIA, SITE UNSPECIFIED: Primary | ICD-10-CM

## 2025-03-17 DIAGNOSIS — R35.0 URINARY FREQUENCY: ICD-10-CM

## 2025-03-17 DIAGNOSIS — K14.9 TONGUE IRRITATION: ICD-10-CM

## 2025-03-17 LAB
BILIRUBIN, POC: NEGATIVE
BLOOD URINE, POC: NEGATIVE
CLARITY, POC: NORMAL
COLOR, POC: YELLOW
GLUCOSE URINE, POC: NEGATIVE MG/DL
KETONES, POC: NORMAL MG/DL
LEUKOCYTE EST, POC: NORMAL
NITRITE, POC: NEGATIVE
PH, POC: 6
PROTEIN, POC: NORMAL MG/DL
SPECIFIC GRAVITY, POC: 1.02
UROBILINOGEN, POC: 0.2 MG/DL

## 2025-03-17 PROCEDURE — 99214 OFFICE O/P EST MOD 30 MIN: CPT

## 2025-03-17 PROCEDURE — 81002 URINALYSIS NONAUTO W/O SCOPE: CPT

## 2025-03-17 PROCEDURE — 3075F SYST BP GE 130 - 139MM HG: CPT

## 2025-03-17 PROCEDURE — 3079F DIAST BP 80-89 MM HG: CPT

## 2025-03-17 RX ORDER — CEFDINIR 300 MG/1
300 CAPSULE ORAL EVERY 12 HOURS
Qty: 14 CAPSULE | Refills: 0 | Status: SHIPPED | OUTPATIENT
Start: 2025-03-17 | End: 2025-03-24

## 2025-03-17 RX ORDER — NYSTATIN 100000 [USP'U]/ML
500000 SUSPENSION ORAL 4 TIMES DAILY
Qty: 140 ML | Refills: 0 | Status: SHIPPED | OUTPATIENT
Start: 2025-03-17 | End: 2025-03-24

## 2025-03-17 NOTE — PROGRESS NOTES
BMI 36.66 kg/m²    Oxygen Saturation Interpretation: Normal.    Constitutional:  A&Ox3, development consistent with age, NAD.  Mouth: Cracked, inflamed tissue to tongue without evidence of white patches. Floor of the mouth is soft. No tenderness in the submental or submandibular space. No tongue elevation.  No drooling.  No trismus present.  No facial edema or redness noted.  Lungs:  CTAB without wheezing, rales, or rhonchi.  Heart:  RRR without pathologic murmurs, rubs, or gallops.  Abdomen: Soft, nondistended, with mild suprapubic tenderness. No rebound, rigidity, or guarding. BS+ X4. No organomegaly.     Back: No CVA tenderness.  Skin:  Normal turgor.  Warm, dry, without visible rash, unless noted elsewhere.  Neurological:  Alert and oriented.  Motor functions intact.  Responds to verbal commands.    Lab / Imaging Results   (All laboratory and radiology results have been personally reviewed by myself)  Labs:  Results for orders placed or performed in visit on 03/17/25   POCT Urinalysis no Micro   Result Value Ref Range    Color, UA yellow     Clarity, UA cloudy     Glucose, UA POC negative mg/dL    Bilirubin, UA negative     Ketones, UA trace mg/dL    Spec Grav, UA 1.025     Blood, UA POC negative     pH, UA 6.0     Protein, UA POC 30 mg/dl mg/dL    Urobilinogen, UA 0.2 mg/dL    Leukocytes, UA small     Nitrite, UA negative        Imaging:  All Radiology results interpreted by Radiologist unless otherwise noted.      Assessment / Plan     Impression(s):  Anita was seen today for urinary frequency, abdominal pain and thrush.    Diagnoses and all orders for this visit:    Urinary tract infection without hematuria, site unspecified  -     cefdinir (OMNICEF) 300 MG capsule; Take 1 capsule by mouth every 12 hours for 7 days    Urinary frequency  -     Culture, Urine; Future  -     POCT Urinalysis no Micro  -     Culture, Urine    Tongue irritation  -     nystatin (MYCOSTATIN) 087198 UNIT/ML suspension; Take 5 mLs by  What Is The Reason For Today's Visit?: History of Melanoma Year Excised?: 2008

## 2025-03-20 ENCOUNTER — RESULTS FOLLOW-UP (OUTPATIENT)
Dept: FAMILY MEDICINE CLINIC | Age: 60
End: 2025-03-20

## 2025-03-20 LAB
CULTURE: ABNORMAL
SPECIMEN DESCRIPTION: ABNORMAL

## 2025-03-20 NOTE — TELEPHONE ENCOUNTER
[facility] calling. Patient is getting radiation therapy. Has been having issues with low blood pressure. 100/63 yesterday and then 96/61 today. When she gets her from her therapy after laying down, she becomes light headed.    Patient is on Laxis, Metoprolol, and Losartan       [0] : 2) Feeling down, depressed, or hopeless: Not at all (0) [PHQ-2 Negative] : PHQ-2 Negative [Pain Scale: ___] : On a scale of 1-10, today the patient's pain is a(n) [unfilled]. [Former] : Former [> 15 Years] : > 15 Years [Date Discussed (MM/DD/YY): ___] : Discussed: [unfilled] [Reviewed, no changes] : Reviewed, no changes [[id number]] : 0 [de-identified] : quit 1974

## 2025-03-26 ENCOUNTER — TELEPHONE (OUTPATIENT)
Dept: SURGERY | Age: 60
End: 2025-03-26

## 2025-03-26 NOTE — TELEPHONE ENCOUNTER
PAT spoke to patient.  Patient is cancelling colonoscopy on 3/28/25 due to starting chemo.  Office will call and r/s

## 2025-03-28 ENCOUNTER — OFFICE VISIT (OUTPATIENT)
Dept: FAMILY MEDICINE CLINIC | Age: 60
End: 2025-03-28
Payer: MEDICARE

## 2025-03-28 VITALS
HEART RATE: 100 BPM | DIASTOLIC BLOOD PRESSURE: 90 MMHG | TEMPERATURE: 97.7 F | SYSTOLIC BLOOD PRESSURE: 130 MMHG | RESPIRATION RATE: 18 BRPM | BODY MASS INDEX: 37.59 KG/M2 | WEIGHT: 219 LBS | OXYGEN SATURATION: 97 %

## 2025-03-28 DIAGNOSIS — R35.0 URINARY FREQUENCY: Primary | ICD-10-CM

## 2025-03-28 LAB
BILIRUBIN, POC: NORMAL
BLOOD URINE, POC: NORMAL
CLARITY, POC: CLEAR
COLOR, POC: YELLOW
GLUCOSE URINE, POC: NORMAL MG/DL
KETONES, POC: NORMAL MG/DL
LEUKOCYTE EST, POC: NORMAL
NITRITE, POC: NORMAL
PH, POC: 6
PROTEIN, POC: 30 MG/DL
SPECIFIC GRAVITY, POC: 1.03
UROBILINOGEN, POC: 0.2 MG/DL

## 2025-03-28 PROCEDURE — 99213 OFFICE O/P EST LOW 20 MIN: CPT | Performed by: FAMILY MEDICINE

## 2025-03-28 PROCEDURE — 3075F SYST BP GE 130 - 139MM HG: CPT | Performed by: FAMILY MEDICINE

## 2025-03-28 PROCEDURE — 81002 URINALYSIS NONAUTO W/O SCOPE: CPT | Performed by: FAMILY MEDICINE

## 2025-03-28 PROCEDURE — 3080F DIAST BP >= 90 MM HG: CPT | Performed by: FAMILY MEDICINE

## 2025-03-28 RX ORDER — FLUCONAZOLE 150 MG/1
150 TABLET ORAL
Qty: 2 TABLET | Refills: 0 | Status: SHIPPED | OUTPATIENT
Start: 2025-03-28 | End: 2025-04-03

## 2025-03-28 RX ORDER — SULFAMETHOXAZOLE AND TRIMETHOPRIM 800; 160 MG/1; MG/1
1 TABLET ORAL 2 TIMES DAILY
Qty: 14 TABLET | Refills: 0 | Status: SHIPPED | OUTPATIENT
Start: 2025-03-28 | End: 2025-04-04

## 2025-03-28 RX ORDER — PHENAZOPYRIDINE HYDROCHLORIDE 200 MG/1
200 TABLET, FILM COATED ORAL 3 TIMES DAILY PRN
Qty: 9 TABLET | Refills: 0 | Status: SHIPPED | OUTPATIENT
Start: 2025-03-28 | End: 2025-03-31

## 2025-03-28 ASSESSMENT — ENCOUNTER SYMPTOMS
BACK PAIN: 1
ABDOMINAL PAIN: 0
RESPIRATORY NEGATIVE: 1

## 2025-03-28 NOTE — PROGRESS NOTES
Anita Dave (:  1965) is a 59 y.o. female,Established patient, here for evaluation of the following chief complaint(s):  Urinary Frequency (X 2 days)         Assessment & Plan  Urinary frequency  At this time we will treat symptomatically.  Last urine culture showed pansensitive E. coli.  Patient still symptomatic.  If no improvement with repeat antibiotics may need further evaluation by specialist or imaging.    Orders:    POCT Urinalysis no Micro    sulfamethoxazole-trimethoprim (BACTRIM DS;SEPTRA DS) 800-160 MG per tablet; Take 1 tablet by mouth 2 times daily for 7 days    phenazopyridine (PYRIDIUM) 200 MG tablet; Take 1 tablet by mouth 3 times daily as needed for Pain    fluconazole (DIFLUCAN) 150 MG tablet; Take 1 tablet by mouth every 72 hours for 6 days      No follow-ups on file.       Subjective   HPI  Patient presents today for recurrent UTI type symptoms.  Was recently seen and started on Omnicef.  Culture showed E. coli.  Patient states that she finished the medication and states that the symptoms did return.  Having frequency and intermittent dysuria.  No fever or chills.  No chest pain or shortness of breath.  Intermittent back pain.      Review of Systems   Constitutional:  Negative for fatigue.   HENT: Negative.     Respiratory: Negative.     Cardiovascular: Negative.    Gastrointestinal:  Negative for abdominal pain.   Genitourinary:  Positive for dysuria and frequency. Negative for flank pain and urgency.   Musculoskeletal:  Positive for back pain.   Skin: Negative.    Neurological: Negative.    All other systems reviewed and are negative.         Current Outpatient Medications:     sulfamethoxazole-trimethoprim (BACTRIM DS;SEPTRA DS) 800-160 MG per tablet, Take 1 tablet by mouth 2 times daily for 7 days, Disp: 14 tablet, Rfl: 0    phenazopyridine (PYRIDIUM) 200 MG tablet, Take 1 tablet by mouth 3 times daily as needed for Pain, Disp: 9 tablet, Rfl: 0    fluconazole (DIFLUCAN)

## 2025-03-30 LAB
CULTURE: NORMAL
SPECIMEN DESCRIPTION: NORMAL

## 2025-03-31 ENCOUNTER — RESULTS FOLLOW-UP (OUTPATIENT)
Dept: FAMILY MEDICINE CLINIC | Age: 60
End: 2025-03-31

## 2025-04-01 LAB — SURGICAL PATHOLOGY REPORT: NORMAL

## 2025-04-04 NOTE — TELEPHONE ENCOUNTER
Name of Medication(s) Requested:  Requested Prescriptions      No prescriptions requested or ordered in this encounter       Medication is on current medication list Yes    Dosage and directions were verified? Yes    Quantity verified: 30 day supply     Pharmacy Verified?  Yes    Last Appointment:  3/11/2025    Future appts:  Future Appointments   Date Time Provider Department Center   4/14/2025  1:00 PM [facility]   7/1/2025  8:20 AM Flaca Cuellar MD [facility]        (If no appt send self scheduling link. .REFILLAPPT)  Scheduling request sent?     [] Yes  [] No    Does patient need updated?  [] Yes  [] No

## 2025-04-16 ENCOUNTER — OFFICE VISIT (OUTPATIENT)
Dept: FAMILY MEDICINE CLINIC | Age: 60
End: 2025-04-16
Payer: MEDICARE

## 2025-04-16 VITALS
HEART RATE: 63 BPM | SYSTOLIC BLOOD PRESSURE: 132 MMHG | HEIGHT: 64 IN | OXYGEN SATURATION: 97 % | BODY MASS INDEX: 36.88 KG/M2 | DIASTOLIC BLOOD PRESSURE: 74 MMHG | WEIGHT: 216 LBS | TEMPERATURE: 98.8 F | RESPIRATION RATE: 18 BRPM

## 2025-04-16 DIAGNOSIS — J01.90 ACUTE NON-RECURRENT SINUSITIS, UNSPECIFIED LOCATION: Primary | ICD-10-CM

## 2025-04-16 DIAGNOSIS — R04.0 EPISTAXIS: ICD-10-CM

## 2025-04-16 DIAGNOSIS — R09.81 NASAL CONGESTION: ICD-10-CM

## 2025-04-16 PROCEDURE — 3075F SYST BP GE 130 - 139MM HG: CPT | Performed by: PHYSICIAN ASSISTANT

## 2025-04-16 PROCEDURE — 99214 OFFICE O/P EST MOD 30 MIN: CPT | Performed by: PHYSICIAN ASSISTANT

## 2025-04-16 PROCEDURE — 3078F DIAST BP <80 MM HG: CPT | Performed by: PHYSICIAN ASSISTANT

## 2025-04-16 RX ORDER — CEFUROXIME AXETIL 250 MG/1
250 TABLET ORAL 2 TIMES DAILY
Qty: 14 TABLET | Refills: 0 | Status: SHIPPED | OUTPATIENT
Start: 2025-04-16 | End: 2025-04-23

## 2025-04-16 RX ORDER — NITROFURANTOIN 25; 75 MG/1; MG/1
CAPSULE ORAL DAILY
COMMUNITY
Start: 2025-03-31

## 2025-04-16 RX ORDER — MUPIROCIN 20 MG/G
OINTMENT TOPICAL
Qty: 30 G | Refills: 0 | Status: SHIPPED | OUTPATIENT
Start: 2025-04-16

## 2025-04-16 NOTE — PROGRESS NOTES
25  Anita Dave : 1965 Sex: female  Age 59 y.o.      Subjective:  Chief Complaint   Patient presents with    Epistaxis    Headache         HPI:     History of Present Illness  The patient is a 59-year-old female who presents for evaluation of epistaxis.    Intermittent episodes of epistaxis have been occurring since 04/15/2025, a symptom not previously experienced. A headache is reported, but blood pressure remains within normal range. Currently, she is on Eliquis, a blood thinner, and undergoing chemotherapy. Nasal congestion and sinus-related issues are also noted. On the night of 2025, severe throat pain, described as feeling like swallowing glass, woke her up; this symptom has since resolved. She is under the care of an ENT specialist, whose name she cannot recall at present.    No current urinary tract infection (UTI) is reported, and Macrobid has been prescribed by Dr. Pérez        ROS:   Unless otherwise stated in this report the patient's positive and negative responses for review of systems for constitutional, eyes, ENT, cardiovascular, respiratory, gastrointestinal, neurological, , musculoskeletal, and integument systems and related systems to the presenting problem are either stated in the history of present illness or were not pertinent or were negative for the symptoms and/or complaints related to the presenting medical problem.  Positives and pertinent negatives as per HPI.  All others reviewed and are negative.      PMH:     Past Medical History:   Diagnosis Date    Aorta disorder     ascending aortic dilation 4.3 cm echo 10/6/23 per Vascular note [facility] 10/20/23 in [facility] & Dr. Cuellar    Atrial septal aneurysm     Dr. Cuellar following    Cancer of esophagus (HCC) 2023    chemo & radiation complete    Gall bladder disease     Herniated disc     Hyperlipidemia     Hypertension     Infectious lymphangitis of deep lymphatics     Kidney stone     h/o    CHRISTIAN (obstructive

## 2025-04-22 ENCOUNTER — HOSPITAL ENCOUNTER (OUTPATIENT)
Dept: RADIATION ONCOLOGY | Age: 60
Discharge: HOME | End: 2025-04-22
Payer: MEDICARE

## 2025-04-22 VITALS
TEMPERATURE: 97.2 F | WEIGHT: 225.4 LBS | HEART RATE: 74 BPM | BODY MASS INDEX: 38.67 KG/M2 | DIASTOLIC BLOOD PRESSURE: 80 MMHG | RESPIRATION RATE: 18 BRPM | SYSTOLIC BLOOD PRESSURE: 122 MMHG

## 2025-04-22 DIAGNOSIS — C15.5 MALIGNANT NEOPLASM OF LOWER THIRD OF ESOPHAGUS (HCC): Primary | ICD-10-CM

## 2025-04-22 PROCEDURE — 99212 OFFICE O/P EST SF 10 MIN: CPT | Performed by: RADIOLOGY

## 2025-04-22 PROCEDURE — 99212 OFFICE O/P EST SF 10 MIN: CPT

## 2025-04-22 ASSESSMENT — PAIN SCALES - GENERAL
PAINLEVEL_OUTOF10: 7
PAINLEVEL_OUTOF10: 5

## 2025-04-22 ASSESSMENT — PAIN DESCRIPTION - LOCATION
LOCATION: ABDOMEN
LOCATION: HAND;ANKLE

## 2025-04-22 NOTE — PROGRESS NOTES
DEPARTMENT OF RADIATION ONCOLOGY   Follow up visit        2025    NAME:  Anita Dave    :  1965 59 y.o. female     PCP: Washington Rivers DO    DIAGNOSIS:  1. Malignant neoplasm of lower third of esophagus (HCC)    :1) recurrent esophageal cancer, SP concurrent chemoradiation.  2) clinical stage IIa (cT1b, N1, M0, G3) adenocarcinoma of the distal esophagus in  patient with a recent history of thrombocytophilia, SP concurrent chemoradiation completed on 3/5/2024.       STAGING: Cancer Staging   Malignant neoplasm of esophagus  Staging form: Esophagus - Adenocarcinoma, AJCC 8th Edition  - Clinical stage from 2023: Stage IIA (cT1b, cN1, cM0, G3) - Signed by Stephanie Palafox MD on 2023      RECENT HISTORY: Anita Dave is now 13 months out from completion of RT to the esophagus, unfortunately the patient had recurrence at the level of the esophagus as well as at the level of 1 retroperitoneal lymph nodes at the level of the saad hepatis.  She is undergoing chemotherapy with FOLFIRI with Dr Yepez.  She is tolerated the treatment well with some paresthesia.  She is due for reevaluation after 4 cycles of chemotherapy with a new PET/CT.  On physical examination she is doing fine.    Past medical, surgical, social and family histories reviewed and updated as indicated.    ALLERGIES:  Quinolones       MEDICATIONS:   Current Outpatient Medications:     nitrofurantoin, macrocrystal-monohydrate, (MACROBID) 100 MG capsule, Take by mouth daily, Disp: , Rfl:     cefUROXime (CEFTIN) 250 MG tablet, Take 1 tablet by mouth 2 times daily for 7 days, Disp: 14 tablet, Rfl: 0    mupirocin (BACTROBAN) 2 % ointment, Apply topically 3 times daily., Disp: 30 g, Rfl: 0    tiZANidine (ZANAFLEX) 4 MG tablet, Take 1 tablet by mouth every 8 hours as needed (spasm), Disp: 30 tablet, Rfl: 1    furosemide (LASIX) 20 MG tablet, TAKE 1 TABLET BY MOUTH EVERY DAY, Disp: 90 tablet, Rfl: 1    omeprazole

## 2025-04-22 NOTE — PROGRESS NOTES
Anita M Jolie  4/22/2025  2:33 PM      Vitals:    04/22/25 1424   BP: 122/80   Pulse: 74   Resp: 18   Temp: 97.2 °F (36.2 °C)    :    Wt Readings from Last 3 Encounters:   04/22/25 102.2 kg (225 lb 6.4 oz)   04/16/25 98 kg (216 lb)   03/28/25 99.3 kg (219 lb)                Current Outpatient Medications:     nitrofurantoin, macrocrystal-monohydrate, (MACROBID) 100 MG capsule, Take by mouth daily, Disp: , Rfl:     cefUROXime (CEFTIN) 250 MG tablet, Take 1 tablet by mouth 2 times daily for 7 days, Disp: 14 tablet, Rfl: 0    mupirocin (BACTROBAN) 2 % ointment, Apply topically 3 times daily., Disp: 30 g, Rfl: 0    tiZANidine (ZANAFLEX) 4 MG tablet, Take 1 tablet by mouth every 8 hours as needed (spasm), Disp: 30 tablet, Rfl: 1    furosemide (LASIX) 20 MG tablet, TAKE 1 TABLET BY MOUTH EVERY DAY, Disp: 90 tablet, Rfl: 1    omeprazole (PRILOSEC) 40 MG delayed release capsule, Take 1 capsule by mouth 2 times daily (before meals), Disp: 60 capsule, Rfl: 11    D3-50 1.25 MG (55711 UT) CAPS, TAKE 1 CAPSULE BY MOUTH ONE TIME PER WEEK, Disp: 12 capsule, Rfl: 1    meloxicam (MOBIC) 15 MG tablet, TAKE 1 TABLET BY MOUTH EVERY DAY (Patient taking differently: Take 1 tablet by mouth as needed), Disp: 30 tablet, Rfl: 11    busPIRone (BUSPAR) 10 MG tablet, TAKE 1 TABLET BY MOUTH TWICE A DAY, Disp: 180 tablet, Rfl: 1    triamcinolone (KENALOG) 0.025 % ointment, Apply topically 2 times daily., Disp: 80 g, Rfl: 1    atorvastatin (LIPITOR) 40 MG tablet, TAKE 1 TABLET BY MOUTH EVERY DAY, Disp: 90 tablet, Rfl: 3    DULoxetine (CYMBALTA) 60 MG extended release capsule, TAKE 1 CAPSULE BY MOUTH EVERY DAY (Patient taking differently: Take by mouth 2 times daily), Disp: 90 capsule, Rfl: 3    metoprolol tartrate (LOPRESSOR) 50 MG tablet, Take 1 tablet by mouth 2 times daily, Disp: 180 tablet, Rfl: 3    ELIQUIS 5 MG TABS tablet, TAKE 1 TABLET BY MOUTH TWICE A DAY (Patient taking differently: Take 1 tablet by mouth 2 times daily Indications:

## 2025-04-29 RX ORDER — METOPROLOL TARTRATE 50 MG
50 TABLET ORAL 2 TIMES DAILY
Qty: 180 TABLET | Refills: 3 | Status: SHIPPED | OUTPATIENT
Start: 2025-04-29

## 2025-05-22 ENCOUNTER — TELEPHONE (OUTPATIENT)
Dept: ADMINISTRATIVE | Age: 60
End: 2025-05-22

## 2025-05-22 RX ORDER — LOSARTAN POTASSIUM 25 MG/1
25 TABLET ORAL DAILY
Qty: 30 TABLET | Refills: 5 | Status: SHIPPED | OUTPATIENT
Start: 2025-05-22

## 2025-05-22 NOTE — TELEPHONE ENCOUNTER
Spoke with patient.  She is asking Dr. Cuellar to review recent PET scan for any changes in her aorta.  Also states she has not been taking Losartan, she believes she was advised to stop it.  Recommendations from visit 3/12/25 states: \"She was recommended to restart losartan 25 mg p.o. daily on follow-up visit because of the thoracic aortic aneurysm not because of high blood pressure.\"  BP is 140/95.

## 2025-05-22 NOTE — TELEPHONE ENCOUNTER
Recent PET scan was reviewed but they did not mention anything about the aorta.  Blood pressure is elevated recommend starting on losartan 25 mg p.o. daily for BP and aneurysm.

## 2025-05-22 NOTE — TELEPHONE ENCOUNTER
Pet scan was sent over. Would like to know if anything was new. And needs med refill. /95.   [phone]

## 2025-05-23 ENCOUNTER — OFFICE VISIT (OUTPATIENT)
Dept: FAMILY MEDICINE CLINIC | Age: 60
End: 2025-05-23
Payer: MEDICARE

## 2025-05-23 VITALS
HEIGHT: 64 IN | HEART RATE: 111 BPM | BODY MASS INDEX: 36.88 KG/M2 | SYSTOLIC BLOOD PRESSURE: 130 MMHG | TEMPERATURE: 97 F | OXYGEN SATURATION: 99 % | DIASTOLIC BLOOD PRESSURE: 72 MMHG | WEIGHT: 216 LBS

## 2025-05-23 DIAGNOSIS — J01.90 ACUTE NON-RECURRENT SINUSITIS, UNSPECIFIED LOCATION: ICD-10-CM

## 2025-05-23 DIAGNOSIS — J04.0 ACUTE LARYNGITIS: ICD-10-CM

## 2025-05-23 DIAGNOSIS — J06.9 ACUTE UPPER RESPIRATORY INFECTION, UNSPECIFIED: ICD-10-CM

## 2025-05-23 DIAGNOSIS — R09.82 POSTNASAL DRIP: ICD-10-CM

## 2025-05-23 DIAGNOSIS — R35.0 URINARY FREQUENCY: Primary | ICD-10-CM

## 2025-05-23 LAB
BILIRUBIN, POC: NEGATIVE
BLOOD URINE, POC: NEGATIVE
CLARITY, POC: CLEAR
COLOR, POC: YELLOW
GLUCOSE URINE, POC: NEGATIVE MG/DL
KETONES, POC: NEGATIVE MG/DL
LEUKOCYTE EST, POC: NEGATIVE
NITRITE, POC: NEGATIVE
PH, POC: 6.5
PROTEIN, POC: NORMAL MG/DL
SPECIFIC GRAVITY, POC: 1.02
UROBILINOGEN, POC: 0.2 MG/DL

## 2025-05-23 PROCEDURE — 3075F SYST BP GE 130 - 139MM HG: CPT | Performed by: PHYSICIAN ASSISTANT

## 2025-05-23 PROCEDURE — 99214 OFFICE O/P EST MOD 30 MIN: CPT | Performed by: PHYSICIAN ASSISTANT

## 2025-05-23 PROCEDURE — 81002 URINALYSIS NONAUTO W/O SCOPE: CPT | Performed by: PHYSICIAN ASSISTANT

## 2025-05-23 PROCEDURE — 3078F DIAST BP <80 MM HG: CPT | Performed by: PHYSICIAN ASSISTANT

## 2025-05-23 RX ORDER — DOXYCYCLINE HYCLATE 100 MG
100 TABLET ORAL 2 TIMES DAILY
Qty: 20 TABLET | Refills: 0 | Status: SHIPPED | OUTPATIENT
Start: 2025-05-23 | End: 2025-06-02

## 2025-05-23 NOTE — PROGRESS NOTES
25  Anita Dave : 1965 Sex: female  Age 59 y.o.      Subjective:  Chief Complaint   Patient presents with    Urinary Frequency    Cough    Congestion    Sore Throat         HPI:     History of Present Illness  The patient is a 59-year-old female who presents for evaluation of cough, congestion, and hoarseness.    She reports experiencing symptoms of cough, congestion, and significant hoarseness. Additionally, she has been dealing with an intermittent sore throat, which is particularly noticeable during the night and early morning hours. These symptoms have been present for several days. She has not experienced any fevers.    She also mentions recurrent urinary tract symptoms, specifically increased frequency of urination. She was previously prescribed Macrobid by Dr. Fairchild as a daily antibiotic regimen.    Her most recent PET scan came back clear. She had three more months of chemotherapy and then they will rescan her.            ROS:   Unless otherwise stated in this report the patient's positive and negative responses for review of systems for constitutional, eyes, ENT, cardiovascular, respiratory, gastrointestinal, neurological, , musculoskeletal, and integument systems and related systems to the presenting problem are either stated in the history of present illness or were not pertinent or were negative for the symptoms and/or complaints related to the presenting medical problem.  Positives and pertinent negatives as per HPI.  All others reviewed and are negative.      PMH:     Past Medical History:   Diagnosis Date    Aorta disorder     ascending aortic dilation 4.3 cm echo 10/6/23 per Vascular note [facility] 10/20/23 in [facility] & Dr. Cuellar    Atrial septal aneurysm     Dr. Cuellar following    Cancer of esophagus (HCC) 2023    chemo & radiation complete    Gall bladder disease     Herniated disc     Hyperlipidemia     Hypertension     Infectious lymphangitis of deep lymphatics     Kidney

## 2025-05-25 LAB
CULTURE: NORMAL
CULTURE: NORMAL
SPECIMEN DESCRIPTION: NORMAL

## 2025-05-27 ENCOUNTER — TELEPHONE (OUTPATIENT)
Dept: PRIMARY CARE CLINIC | Age: 60
End: 2025-05-27

## 2025-05-27 ENCOUNTER — RESULTS FOLLOW-UP (OUTPATIENT)
Dept: FAMILY MEDICINE CLINIC | Age: 60
End: 2025-05-27

## 2025-05-27 RX ORDER — BENZONATATE 100 MG/1
CAPSULE ORAL
Qty: 60 CAPSULE | Refills: 1 | Status: SHIPPED | OUTPATIENT
Start: 2025-05-27

## 2025-05-27 RX ORDER — GUAIFENESIN/DEXTROMETHORPHAN 100-10MG/5
5 SYRUP ORAL 3 TIMES DAILY PRN
Qty: 120 ML | Refills: 1 | Status: SHIPPED | OUTPATIENT
Start: 2025-05-27 | End: 2025-06-06

## 2025-05-27 RX ORDER — LOSARTAN POTASSIUM 25 MG/1
25 TABLET ORAL DAILY
Qty: 90 TABLET | Refills: 3 | Status: SHIPPED | OUTPATIENT
Start: 2025-05-27

## 2025-05-27 NOTE — TELEPHONE ENCOUNTER
Patient called in, was seen Friday for URI and Sinus.  Is being treated with Doxy, she is also getting Chemo.  The cough is no better and she has so much sinus drainage.  Is there anything else she can do? Do you recommend her being seen again?  Please advise.     [facility] incase you call something else in.

## 2025-05-29 ENCOUNTER — TELEPHONE (OUTPATIENT)
Dept: CARDIOLOGY | Age: 60
End: 2025-05-29

## 2025-05-29 DIAGNOSIS — Z79.899 DRUG THERAPY: Primary | ICD-10-CM

## 2025-05-29 NOTE — TELEPHONE ENCOUNTER
CALLED PATIENT AND LEFT MESSAGE TO SCHEDULE ECHO    Electronically signed by Lucero Still on 5/29/2025 at 12:24 PM

## 2025-06-02 ENCOUNTER — HOSPITAL ENCOUNTER (OUTPATIENT)
Dept: CARDIOLOGY | Age: 60
Discharge: HOME | End: 2025-06-04
Attending: INTERNAL MEDICINE
Payer: MEDICARE

## 2025-06-02 VITALS
DIASTOLIC BLOOD PRESSURE: 72 MMHG | SYSTOLIC BLOOD PRESSURE: 130 MMHG | BODY MASS INDEX: 36.88 KG/M2 | WEIGHT: 216 LBS | HEIGHT: 64 IN

## 2025-06-02 DIAGNOSIS — Z79.899 DRUG THERAPY: ICD-10-CM

## 2025-06-02 PROCEDURE — 93308 TTE F-UP OR LMTD: CPT

## 2025-06-03 ENCOUNTER — TELEPHONE (OUTPATIENT)
Dept: PRIMARY CARE CLINIC | Age: 60
End: 2025-06-03

## 2025-06-03 DIAGNOSIS — F41.9 ANXIETY: ICD-10-CM

## 2025-06-03 RX ORDER — BUSPIRONE HYDROCHLORIDE 10 MG/1
10 TABLET ORAL 2 TIMES DAILY
Qty: 180 TABLET | Refills: 1 | Status: SHIPPED | OUTPATIENT
Start: 2025-06-03

## 2025-06-03 RX ORDER — CEFUROXIME AXETIL 500 MG/1
500 TABLET ORAL 2 TIMES DAILY
Qty: 20 TABLET | Refills: 0 | Status: SHIPPED | OUTPATIENT
Start: 2025-06-03 | End: 2025-06-13

## 2025-06-03 NOTE — TELEPHONE ENCOUNTER
Pt seen Jesús on the 23rd for URI and given doxy bid, finished Saturday but still has sinus congestion-yellow mucus and coughing-yellow sputum, afebrile. Denies sob/wheezing, currently on chemo for esophageal Ca. What next?

## 2025-06-04 LAB
ECHO AV AREA PEAK VELOCITY: 2.3 CM2
ECHO AV AREA PLAN/BSA: 1.53 CM2/M2
ECHO AV AREA PLAN: 3.1 CM2
ECHO AV AREA VTI: 2.1 CM2
ECHO AV AREA/BSA PEAK VELOCITY: 1.1 CM2/M2
ECHO AV AREA/BSA VTI: 1 CM2/M2
ECHO AV MEAN GRADIENT: 6 MMHG
ECHO AV MEAN VELOCITY: 1.1 M/S
ECHO AV PEAK GRADIENT: 12 MMHG
ECHO AV PEAK VELOCITY: 1.7 M/S
ECHO AV VELOCITY RATIO: 0.88
ECHO AV VTI: 35.2 CM
ECHO BSA: 2.1 M2
ECHO EST RA PRESSURE: 3 MMHG
ECHO IVC PROX: 2.5 CM
ECHO LA DIAMETER INDEX: 2.28 CM/M2
ECHO LA DIAMETER: 4.6 CM
ECHO LA VOL A-L A2C: 75 ML (ref 22–52)
ECHO LA VOL A-L A4C: 68 ML (ref 22–52)
ECHO LA VOL MOD A2C: 72 ML (ref 22–52)
ECHO LA VOL MOD A4C: 65 ML (ref 22–52)
ECHO LA VOLUME AREA LENGTH: 76 ML
ECHO LA VOLUME INDEX A-L A2C: 37 ML/M2 (ref 16–34)
ECHO LA VOLUME INDEX A-L A4C: 34 ML/M2 (ref 16–34)
ECHO LA VOLUME INDEX AREA LENGTH: 38 ML/M2 (ref 16–34)
ECHO LA VOLUME INDEX MOD A2C: 36 ML/M2 (ref 16–34)
ECHO LA VOLUME INDEX MOD A4C: 32 ML/M2 (ref 16–34)
ECHO LV CARDIAC INDEX: 3 L/MIN/M2
ECHO LV EDV A2C: 85 ML
ECHO LV EDV A4C: 72 ML
ECHO LV EDV BP: 80 ML (ref 56–104)
ECHO LV EDV INDEX A4C: 36 ML/M2
ECHO LV EDV INDEX BP: 40 ML/M2
ECHO LV EDV NDEX A2C: 42 ML/M2
ECHO LV EF PHYSICIAN: 62 %
ECHO LV EJECTION FRACTION A2C: 61 %
ECHO LV EJECTION FRACTION A4C: 67 %
ECHO LV EJECTION FRACTION BIPLANE: 63 % (ref 55–100)
ECHO LV ESV A2C: 33 ML
ECHO LV ESV A4C: 24 ML
ECHO LV ESV BP: 30 ML (ref 19–49)
ECHO LV ESV INDEX A2C: 16 ML/M2
ECHO LV ESV INDEX A4C: 12 ML/M2
ECHO LV ESV INDEX BP: 15 ML/M2
ECHO LV FRACTIONAL SHORTENING: 27 % (ref 28–44)
ECHO LV GLOBAL LONGITUDINAL STRAIN (GLS): -19.8 %
ECHO LV INTERNAL DIMENSION DIASTOLE INDEX: 2.03 CM/M2
ECHO LV INTERNAL DIMENSION DIASTOLIC: 4.1 CM (ref 3.9–5.3)
ECHO LV INTERNAL DIMENSION SYSTOLIC INDEX: 1.49 CM/M2
ECHO LV INTERNAL DIMENSION SYSTOLIC: 3 CM
ECHO LV IVSD: 0.8 CM (ref 0.6–0.9)
ECHO LV IVSS: 1 CM
ECHO LV MASS 2D: 114.1 G (ref 67–162)
ECHO LV MASS INDEX 2D: 56.5 G/M2 (ref 43–95)
ECHO LV POSTERIOR WALL DIASTOLIC: 1 CM (ref 0.6–0.9)
ECHO LV POSTERIOR WALL SYSTOLIC: 1.5 CM
ECHO LV RELATIVE WALL THICKNESS RATIO: 0.49
ECHO LVOT AREA: 2.8 CM2
ECHO LVOT AV VTI INDEX: 0.77
ECHO LVOT CARDIAC OUTPUT: 6.1 L/MIN
ECHO LVOT DIAM: 1.9 CM
ECHO LVOT MEAN GRADIENT: 4 MMHG
ECHO LVOT PEAK GRADIENT: 8 MMHG
ECHO LVOT PEAK VELOCITY: 1.5 M/S
ECHO LVOT STROKE VOLUME INDEX: 38 ML/M2
ECHO LVOT SV: 76.8 ML
ECHO LVOT VTI: 27.1 CM
ECHO MV "A" WAVE DURATION: 147.6 MSEC
ECHO MV A VELOCITY: 0.8 M/S
ECHO MV E DECELERATION TIME (DT): 186.2 MS
ECHO MV E VELOCITY: 0.71 M/S
ECHO MV E/A RATIO: 0.89
ECHO PVEIN A DURATION: 147.6 MS
ECHO PVEIN A VELOCITY: 0.3 M/S
ECHO PVEIN PEAK D VELOCITY: 0.4 M/S
ECHO PVEIN PEAK S VELOCITY: 0.7 M/S
ECHO PVEIN S/D RATIO: 1.8
ECHO RA AREA 4C: 16.6 CM2
ECHO RA VOLUME AREA LENGTH APICAL 4 CHAMBER: 46 ML
ECHO RIGHT VENTRICULAR SYSTOLIC PRESSURE (RVSP): 33 MMHG
ECHO RV BASAL DIMENSION: 3.6 CM
ECHO RV INTERNAL DIMENSION: 3.7 CM
ECHO RV LONGITUDINAL DIMENSION: 6.7 CM
ECHO RV MID DIMENSION: 3.1 CM
ECHO RV TAPSE: 1.6 CM (ref 1.7–?)
ECHO TV REGURGITANT MAX VELOCITY: 2.72 M/S
ECHO TV REGURGITANT PEAK GRADIENT: 30 MMHG

## 2025-06-04 PROCEDURE — 93306 TTE W/DOPPLER COMPLETE: CPT | Performed by: INTERNAL MEDICINE

## 2025-06-04 PROCEDURE — 93356 MYOCRD STRAIN IMG SPCKL TRCK: CPT | Performed by: INTERNAL MEDICINE

## 2025-06-06 RX ORDER — CHOLECALCIFEROL (VITAMIN D3) 1250 MCG
1 CAPSULE ORAL WEEKLY
Qty: 12 CAPSULE | Refills: 1 | Status: SHIPPED | OUTPATIENT
Start: 2025-06-06

## 2025-06-28 ENCOUNTER — OFFICE VISIT (OUTPATIENT)
Dept: FAMILY MEDICINE CLINIC | Age: 60
End: 2025-06-28
Payer: MEDICARE

## 2025-06-28 VITALS
WEIGHT: 211 LBS | BODY MASS INDEX: 36.02 KG/M2 | SYSTOLIC BLOOD PRESSURE: 126 MMHG | HEIGHT: 64 IN | TEMPERATURE: 97.8 F | OXYGEN SATURATION: 99 % | DIASTOLIC BLOOD PRESSURE: 70 MMHG | HEART RATE: 110 BPM

## 2025-06-28 DIAGNOSIS — R82.998 ORANGE-COLORED URINE: Primary | ICD-10-CM

## 2025-06-28 DIAGNOSIS — J01.90 ACUTE BACTERIAL SINUSITIS: ICD-10-CM

## 2025-06-28 DIAGNOSIS — R35.0 URINARY FREQUENCY: ICD-10-CM

## 2025-06-28 DIAGNOSIS — B96.89 ACUTE BACTERIAL SINUSITIS: ICD-10-CM

## 2025-06-28 LAB
BILIRUBIN, POC: ABNORMAL
BLOOD URINE, POC: ABNORMAL
CLARITY, POC: CLEAR
COLOR, POC: ABNORMAL
GLUCOSE URINE, POC: ABNORMAL MG/DL
KETONES, POC: ABNORMAL MG/DL
LEUKOCYTE EST, POC: ABNORMAL
NITRITE, POC: ABNORMAL
PH, POC: 6
PROTEIN, POC: 100 MG/DL
SPECIFIC GRAVITY, POC: >=1.03
UROBILINOGEN, POC: 0.2 MG/DL

## 2025-06-28 PROCEDURE — 3074F SYST BP LT 130 MM HG: CPT | Performed by: STUDENT IN AN ORGANIZED HEALTH CARE EDUCATION/TRAINING PROGRAM

## 2025-06-28 PROCEDURE — 99213 OFFICE O/P EST LOW 20 MIN: CPT | Performed by: STUDENT IN AN ORGANIZED HEALTH CARE EDUCATION/TRAINING PROGRAM

## 2025-06-28 PROCEDURE — 81002 URINALYSIS NONAUTO W/O SCOPE: CPT | Performed by: STUDENT IN AN ORGANIZED HEALTH CARE EDUCATION/TRAINING PROGRAM

## 2025-06-28 PROCEDURE — 3078F DIAST BP <80 MM HG: CPT | Performed by: STUDENT IN AN ORGANIZED HEALTH CARE EDUCATION/TRAINING PROGRAM

## 2025-06-28 RX ORDER — DOXYCYCLINE HYCLATE 100 MG
100 TABLET ORAL 2 TIMES DAILY
Qty: 20 TABLET | Refills: 0 | Status: SHIPPED | OUTPATIENT
Start: 2025-06-28 | End: 2025-07-08

## 2025-06-28 NOTE — PROGRESS NOTES
25  Anita Dave : 1965 Sex: female  Age 59 y.o.    Subjective:  Chief Complaint   Patient presents with    Pharyngitis    Urinary Frequency    Congestion       HPI:   Anita Dave , 59 y.o. female presents to the clinic for evaluation of sore throat congestion chills x 1 days. The patient has not taken anything for symptoms. The patient reports no known ill exposure. The patient denies acute loss of taste or smell, headache, ear pain, and rash. The patient denies body aches, fever, and fatigue. The patient also denies chest pain, abdominal pain, shortness of breath, wheezing, and nausea / vomiting / diarrhea.    Urinary Tract Infection  Patient complains of frequency She has had symptoms for a few days. Patient also complains of congestion and sorethroat. Patient denies back pain. Patient does not have a history of recurrent UTI.  Patient does not have a history of pyelonephritis.     She is currently undergoing therapy for her cancer which will now be lifelong    ROS:   Unless otherwise stated in this report the patient's positive and negative responses for review of systems for constitutional, eyes, ENT, cardiovascular, respiratory, gastrointestinal, neurological, , musculoskeletal, and integument systems and related systems to the presenting problem are either stated in the history of present illness or were not pertinent or were negative for the symptoms and/or complaints related to the presenting medical problem.  Positives and pertinent negatives as per HPI.  All others reviewed and are negative.      PMH:     Past Medical History:   Diagnosis Date    Aorta disorder     ascending aortic dilation 4.3 cm echo 10/6/23 per Vascular note [facility] 10/20/23 in [facility] & Dr. Cuellar    Atrial septal aneurysm     Dr. Cuellar following    Cancer of esophagus (HCC) 2023    chemo & radiation complete    Gall bladder disease     Herniated disc     Hyperlipidemia     Hypertension     Infectious

## 2025-07-01 ENCOUNTER — OFFICE VISIT (OUTPATIENT)
Dept: PRIMARY CARE CLINIC | Age: 60
End: 2025-07-01
Payer: MEDICARE

## 2025-07-01 ENCOUNTER — OFFICE VISIT (OUTPATIENT)
Dept: CARDIOLOGY CLINIC | Age: 60
End: 2025-07-01
Payer: MEDICARE

## 2025-07-01 VITALS
HEART RATE: 93 BPM | WEIGHT: 217.7 LBS | RESPIRATION RATE: 18 BRPM | TEMPERATURE: 96.8 F | HEIGHT: 64 IN | SYSTOLIC BLOOD PRESSURE: 126 MMHG | BODY MASS INDEX: 37.17 KG/M2 | DIASTOLIC BLOOD PRESSURE: 76 MMHG

## 2025-07-01 VITALS
BODY MASS INDEX: 37.56 KG/M2 | HEIGHT: 64 IN | HEART RATE: 85 BPM | SYSTOLIC BLOOD PRESSURE: 140 MMHG | WEIGHT: 220 LBS | TEMPERATURE: 96.9 F | RESPIRATION RATE: 18 BRPM | OXYGEN SATURATION: 96 % | DIASTOLIC BLOOD PRESSURE: 80 MMHG

## 2025-07-01 DIAGNOSIS — E78.5 HYPERLIPIDEMIA, UNSPECIFIED HYPERLIPIDEMIA TYPE: ICD-10-CM

## 2025-07-01 DIAGNOSIS — I10 PRIMARY HYPERTENSION: Primary | ICD-10-CM

## 2025-07-01 DIAGNOSIS — E11.9 TYPE 2 DIABETES MELLITUS WITHOUT COMPLICATION, WITHOUT LONG-TERM CURRENT USE OF INSULIN (HCC): ICD-10-CM

## 2025-07-01 DIAGNOSIS — R30.0 DYSURIA: ICD-10-CM

## 2025-07-01 DIAGNOSIS — R35.0 FREQUENT URINATION: Primary | ICD-10-CM

## 2025-07-01 DIAGNOSIS — I25.10 CORONARY ARTERY DISEASE DUE TO CALCIFIED CORONARY LESION: ICD-10-CM

## 2025-07-01 DIAGNOSIS — I10 PRIMARY HYPERTENSION: ICD-10-CM

## 2025-07-01 DIAGNOSIS — I71.20 THORACIC AORTIC ANEURYSM WITHOUT RUPTURE, UNSPECIFIED PART: ICD-10-CM

## 2025-07-01 DIAGNOSIS — I25.84 CORONARY ARTERY DISEASE DUE TO CALCIFIED CORONARY LESION: ICD-10-CM

## 2025-07-01 LAB
BILIRUBIN, POC: NEGATIVE
BLOOD URINE, POC: NEGATIVE
CLARITY, POC: CLEAR
COLOR, POC: NORMAL
GLUCOSE URINE, POC: NEGATIVE MG/DL
KETONES, POC: NEGATIVE MG/DL
LEUKOCYTE EST, POC: NEGATIVE
NITRITE, POC: NEGATIVE
PH, POC: 6
PROTEIN, POC: NEGATIVE MG/DL
SPECIFIC GRAVITY, POC: 1.02
UROBILINOGEN, POC: 0.2 MG/DL

## 2025-07-01 PROCEDURE — 93000 ELECTROCARDIOGRAM COMPLETE: CPT | Performed by: INTERNAL MEDICINE

## 2025-07-01 PROCEDURE — 99214 OFFICE O/P EST MOD 30 MIN: CPT | Performed by: INTERNAL MEDICINE

## 2025-07-01 PROCEDURE — 3079F DIAST BP 80-89 MM HG: CPT | Performed by: FAMILY MEDICINE

## 2025-07-01 PROCEDURE — 3044F HG A1C LEVEL LT 7.0%: CPT | Performed by: FAMILY MEDICINE

## 2025-07-01 PROCEDURE — 99214 OFFICE O/P EST MOD 30 MIN: CPT | Performed by: FAMILY MEDICINE

## 2025-07-01 PROCEDURE — 81002 URINALYSIS NONAUTO W/O SCOPE: CPT | Performed by: FAMILY MEDICINE

## 2025-07-01 PROCEDURE — 3077F SYST BP >= 140 MM HG: CPT | Performed by: FAMILY MEDICINE

## 2025-07-01 PROCEDURE — 3078F DIAST BP <80 MM HG: CPT | Performed by: INTERNAL MEDICINE

## 2025-07-01 PROCEDURE — 3074F SYST BP LT 130 MM HG: CPT | Performed by: INTERNAL MEDICINE

## 2025-07-01 PROCEDURE — G2211 COMPLEX E/M VISIT ADD ON: HCPCS | Performed by: INTERNAL MEDICINE

## 2025-07-01 ASSESSMENT — ENCOUNTER SYMPTOMS
SORE THROAT: 0
NAUSEA: 0
CHEST TIGHTNESS: 0
ALLERGIC/IMMUNOLOGIC NEGATIVE: 1
VOMITING: 0
BLOOD IN STOOL: 0
FACIAL SWELLING: 0
DIARRHEA: 0
SINUS PRESSURE: 0
SHORTNESS OF BREATH: 0
BACK PAIN: 0
WHEEZING: 0
COUGH: 0
ABDOMINAL PAIN: 1
COLOR CHANGE: 0
PHOTOPHOBIA: 0
APNEA: 0

## 2025-07-01 NOTE — PATIENT INSTRUCTIONS
Recent echo done in June 2025 were reviewed and discussed patient has normal LV function normal LV strain no significant VHD.  Coronary CTA  results reviewed, as above and discussed the patient,  has non obstructive CAD and mild TAA.  Recent chest CT results done on 12/4/2024 were reviewed, thoracic aortic aneurysm measuring at 4.3 x 4.3 cm at the pulmonary artery level slightly increased by 1 mm.  Will continue metoprolol  50 mg twice a day.  Continue atorvastatin 40 mg p.o. daily.  Patient was advised to stay well-hydrated  She was recommended to continue losartan 25 mg p.o. daily on follow-up visit because of the thoracic aortic aneurysm not because of high blood pressure.    Follow up with oncology  service as scheduled.  Continue current mediations  including Eliquis   Recent labs were reviewed, normal CBC, BMP A1c 6.1.  Echo results reviewed, as above has normal diastolic and systolic function.  Coronary CTA showed mild nonobstructive CAD.  Will repeat another CTA chest to assess ascending thoracic arctic aneurysm size in December 2025  Follow up with me in 6 months.

## 2025-07-01 NOTE — PROGRESS NOTES
OUTPATIENT CARDIOLOGY FOLLOW-UP    Name: Anita Dave    Age: 59 y.o.    Primary Care Physician: Washington Rivers DO    Date of Service: 7/1/2025    Chief Complaint:   Chief Complaint   Patient presents with    Coronary Artery Disease       Interim History:   Ms. Dave is a 59-year-old  female with history of morbid obesity, atrial septal aneurysm, palpitations and atypical chest pain presented to the office visit for evaluation of dyspnea with exertion and leg edema.  She was evaluated in the office by Dr. Constantino on 11/5/2021 and noted that her Holter, stress test and echo results were normal and no further cardiac work-up was recommended.  Patient was reassured and was recommended follow-up as needed.  She has been having dyspnea for the past 15 years along with some chest pain.  She told me that she recently lost her  to  colon cancer and passed away in January 2022.    She was seen in the office on 3/12/25, since last visit, she has not had any ER visits or hospitalizations.  She is compliant with medications, as well as salt and fluid intake.  She does not take any over-the-counter arthritis medications. She was diagnosed with esophageal adenocarcinoma stage T1 and N1 in11/23.  She is done with chemo and XRT and now eosphageal cancer is in remission and she decided not to go for surgery and following with oncolgy service.  Patient underwent surveillance EGD and also PET scans for any recurrence and was told she has a recurrence of the cancer involving the lymph nodes without any Arellano stasis.  Patient was initiated back on chemo and immunotherapy since 2/24/2025..    She had chest pain on 4/24/23, and was associated with dizziness, sweating, patient went to the emergency room and was evaluated and discharged home.  Recently, she was admitted to hospital from 3/6/23 to 3/20/23 with bilateral submassive PE.  She was initiated on anticoagulation therapy and currently taking Eliquis 5 mg

## 2025-07-01 NOTE — PROGRESS NOTES
garcia???  Previous CTA in 2019-- Aorta: Normal. No aortic aneurysm. No aortic dissection.   New dilatation of the ascending aorta measuring up to 4.3 cm  Preventative measure       Social History     Socioeconomic History    Marital status:      Spouse name: None    Number of children: None    Years of education: None    Highest education level: None   Tobacco Use    Smoking status: Never    Smokeless tobacco: Never   Vaping Use    Vaping status: Never Used   Substance and Sexual Activity    Alcohol use: No    Drug use: No    Sexual activity: Defer     Social Drivers of Health     Financial Resource Strain: Low Risk  (7/10/2024)    Overall Financial Resource Strain (CARDIA)     Difficulty of Paying Living Expenses: Not hard at all   Food Insecurity: Food Insecurity Present (3/11/2025)    Hunger Vital Sign     Worried About Running Out of Food in the Last Year: Often true     Ran Out of Food in the Last Year: Often true   Transportation Needs: No Transportation Needs (3/11/2025)    PRAPARE - Transportation     Lack of Transportation (Medical): No     Lack of Transportation (Non-Medical): No   Physical Activity: Unknown (9/6/2024)    Exercise Vital Sign     Days of Exercise per Week: 0 days   Housing Stability: High Risk (3/11/2025)    Housing Stability Vital Sign     Unable to Pay for Housing in the Last Year: Yes     Number of Times Moved in the Last Year: 0     Homeless in the Last Year: No       Family History   Problem Relation Age of Onset    Stroke Mother     High Blood Pressure Mother     Hypertension Mother     Arthritis Mother     Heart Disease Mother     Colon Cancer Father     Breast Cancer Sister 50    Cancer Sister     High Blood Pressure Brother     Heart Disease Brother     Arthritis Brother     Breast Cancer Paternal Grandmother     Breast Cancer Maternal Cousin          Review of Systems   Constitutional: Negative.    HENT:  Negative for congestion, facial swelling, hearing loss,

## 2025-07-03 ENCOUNTER — RESULTS FOLLOW-UP (OUTPATIENT)
Dept: PRIMARY CARE CLINIC | Age: 60
End: 2025-07-03

## 2025-07-03 LAB
CULTURE: NORMAL
CULTURE: NORMAL
SPECIMEN DESCRIPTION: NORMAL

## 2025-07-16 RX ORDER — DULOXETIN HYDROCHLORIDE 60 MG/1
CAPSULE, DELAYED RELEASE ORAL DAILY
Qty: 90 CAPSULE | Refills: 3 | Status: SHIPPED | OUTPATIENT
Start: 2025-07-16

## 2025-07-29 RX ORDER — ATORVASTATIN CALCIUM 40 MG/1
40 TABLET, FILM COATED ORAL DAILY
Qty: 90 TABLET | Refills: 3 | Status: SHIPPED | OUTPATIENT
Start: 2025-07-29

## 2025-08-22 ENCOUNTER — TELEPHONE (OUTPATIENT)
Dept: ADMINISTRATIVE | Age: 60
End: 2025-08-22

## 2025-08-22 DIAGNOSIS — R06.02 SOB (SHORTNESS OF BREATH): ICD-10-CM

## 2025-08-22 DIAGNOSIS — M79.89 LEG SWELLING: ICD-10-CM

## 2025-08-22 RX ORDER — FUROSEMIDE 20 MG/1
20 TABLET ORAL DAILY
Qty: 90 TABLET | Refills: 1 | Status: SHIPPED | OUTPATIENT
Start: 2025-08-22

## 2025-09-02 ENCOUNTER — HOSPITAL ENCOUNTER (OUTPATIENT)
Dept: CARDIOLOGY | Age: 60
Discharge: HOME | End: 2025-09-04
Payer: MEDICARE

## 2025-09-02 VITALS
HEIGHT: 64 IN | SYSTOLIC BLOOD PRESSURE: 140 MMHG | DIASTOLIC BLOOD PRESSURE: 80 MMHG | BODY MASS INDEX: 37.56 KG/M2 | WEIGHT: 220 LBS

## 2025-09-02 DIAGNOSIS — D49.0 NEOPLASM OF ESOPHAGUS: ICD-10-CM

## 2025-09-02 PROCEDURE — 93308 TTE F-UP OR LMTD: CPT

## 2025-09-03 LAB
ECHO BSA: 2.12 M2
ECHO EST RA PRESSURE: 3 MMHG
ECHO LA VOL A-L A2C: 56 ML (ref 22–52)
ECHO LA VOL A-L A4C: 38 ML (ref 22–52)
ECHO LA VOL MOD A2C: 53 ML (ref 22–52)
ECHO LA VOL MOD A4C: 36 ML (ref 22–52)
ECHO LA VOLUME AREA LENGTH: 48 ML
ECHO LA VOLUME INDEX A-L A2C: 27 ML/M2 (ref 16–34)
ECHO LA VOLUME INDEX A-L A4C: 19 ML/M2 (ref 16–34)
ECHO LA VOLUME INDEX AREA LENGTH: 24 ML/M2 (ref 16–34)
ECHO LA VOLUME INDEX MOD A2C: 26 ML/M2 (ref 16–34)
ECHO LA VOLUME INDEX MOD A4C: 18 ML/M2 (ref 16–34)
ECHO LV EF PHYSICIAN: 62 %
ECHO LV EJECTION FRACTION A2C: 55 %
ECHO LV EJECTION FRACTION A4C: 60 %
ECHO LV FRACTIONAL SHORTENING: 29 % (ref 28–44)
ECHO LV INTERNAL DIMENSION DIASTOLE INDEX: 2.01 CM/M2
ECHO LV INTERNAL DIMENSION DIASTOLIC: 4.1 CM (ref 3.9–5.3)
ECHO LV INTERNAL DIMENSION SYSTOLIC INDEX: 1.42 CM/M2
ECHO LV INTERNAL DIMENSION SYSTOLIC: 2.9 CM
ECHO LV IVSD: 1 CM (ref 0.6–0.9)
ECHO LV IVSS: 1.2 CM
ECHO LV MASS 2D: 132.1 G (ref 67–162)
ECHO LV MASS INDEX 2D: 64.8 G/M2 (ref 43–95)
ECHO LV POSTERIOR WALL DIASTOLIC: 1 CM (ref 0.6–0.9)
ECHO LV POSTERIOR WALL SYSTOLIC: 1.2 CM
ECHO LV RELATIVE WALL THICKNESS RATIO: 0.49
ECHO MV A VELOCITY: 1.09 M/S
ECHO MV E DECELERATION TIME (DT): 175.5 MS
ECHO MV E VELOCITY: 0.59 M/S
ECHO MV E/A RATIO: 0.54
ECHO RIGHT VENTRICULAR SYSTOLIC PRESSURE (RVSP): 32 MMHG
ECHO TV REGURGITANT MAX VELOCITY: 2.68 M/S
ECHO TV REGURGITANT PEAK GRADIENT: 29 MMHG

## 2025-09-05 RX ORDER — BENZONATATE 100 MG/1
100 CAPSULE ORAL 2 TIMES DAILY PRN
Qty: 60 CAPSULE | Refills: 1 | Status: SHIPPED | OUTPATIENT
Start: 2025-09-05

## (undated) DEVICE — ENDOSCOPIC ULTRASOUND FINE NEEDLE BIOPSY (FNB) DEVICE: Brand: ACQUIRE

## (undated) DEVICE — NEEDLE ASPIR 22 GAX0-8 CM 1.65X2.4 MM SHTH EXPECT SLM LN

## (undated) DEVICE — BLOCK BITE 60FR RUBBER ADLT DENTAL

## (undated) DEVICE — AIR/WATER CLEANING ADAPTER FOR OLYMPUS® GI ENDOSCOPE: Brand: BULLDOG®

## (undated) DEVICE — FORCEP BX 2.2 MMX240 CM CHANNEL SERRATED RADIAL JAW 4

## (undated) DEVICE — SYSTEM BX CAP BILI RAP EXCHG CAP LOK DEV COMPATIBLE W/ OLY

## (undated) DEVICE — FORCEP BX LG CAP 2.4 MMX120 CM W/ NDL YEL RADIAL JAW 4 DISP

## (undated) DEVICE — SYSTEM INJ BILI RAP REFIL CONT

## (undated) DEVICE — BITEBLOCK 54FR W/ DENT RIM BLOX

## (undated) DEVICE — DEFENDO AIR WATER SUCTION AND BIOPSY VALVE KIT FOR  OLYMPUS: Brand: DEFENDO AIR/WATER/SUCTION AND BIOPSY VALVE

## (undated) DEVICE — FORCEPS BX OVL CUP FEN DISPOSABLE CAP L 160CM RAD JAW 4

## (undated) DEVICE — Device: Brand: BALLOON3

## (undated) DEVICE — SPONGE GZ W4XL4IN RAYON POLY CVR W/NONWOVEN FAB STRL 2/PK

## (undated) DEVICE — CANNULA NSL CO2 SAMPLING AD ORAL W/ O2 DEL CAPNOFLEX

## (undated) DEVICE — GRADUATE TRIANG MEASURE 1000ML BLK PRNT

## (undated) DEVICE — CANNULATING SPHINCTEROTOME: Brand: JAGTOME™ REVOLUTION RX

## (undated) DEVICE — GAUZE,SPONGE,4"X4",8PLY,STRL,LF,10/TRAY: Brand: MEDLINE

## (undated) DEVICE — ENDOSCOPIC KIT 1.1+ OP4 NO CP DE

## (undated) DEVICE — CONTAINER SPEC 60ML PH 7NEUTRAL BUFF FRMLN RDY TO USE

## (undated) DEVICE — MEDIA CONTRAST ISOVUE 300 100ML